# Patient Record
Sex: FEMALE | Race: WHITE | NOT HISPANIC OR LATINO | Employment: FULL TIME | URBAN - NONMETROPOLITAN AREA
[De-identification: names, ages, dates, MRNs, and addresses within clinical notes are randomized per-mention and may not be internally consistent; named-entity substitution may affect disease eponyms.]

---

## 2017-01-21 ENCOUNTER — APPOINTMENT (OUTPATIENT)
Dept: GENERAL RADIOLOGY | Facility: HOSPITAL | Age: 22
End: 2017-01-21

## 2017-01-21 ENCOUNTER — HOSPITAL ENCOUNTER (EMERGENCY)
Facility: HOSPITAL | Age: 22
Discharge: HOME | End: 2017-01-21
Admitting: EMERGENCY MEDICINE

## 2017-01-21 VITALS
HEIGHT: 62 IN | BODY MASS INDEX: 30.36 KG/M2 | DIASTOLIC BLOOD PRESSURE: 84 MMHG | OXYGEN SATURATION: 99 % | TEMPERATURE: 98.5 F | SYSTOLIC BLOOD PRESSURE: 128 MMHG | WEIGHT: 165 LBS | HEART RATE: 80 BPM | RESPIRATION RATE: 16 BRPM

## 2017-01-21 DIAGNOSIS — M54.9 ACUTE LEFT-SIDED BACK PAIN, UNSPECIFIED BACK LOCATION: Primary | ICD-10-CM

## 2017-01-21 LAB — B-HCG UR QL: NEGATIVE

## 2017-01-21 PROCEDURE — 25010000002 METHYLPREDNISOLONE PER 125 MG: Performed by: PHYSICIAN ASSISTANT

## 2017-01-21 PROCEDURE — 72110 X-RAY EXAM L-2 SPINE 4/>VWS: CPT | Performed by: RADIOLOGY

## 2017-01-21 PROCEDURE — 96372 THER/PROPH/DIAG INJ SC/IM: CPT

## 2017-01-21 PROCEDURE — 72072 X-RAY EXAM THORAC SPINE 3VWS: CPT | Performed by: RADIOLOGY

## 2017-01-21 PROCEDURE — 25010000002 KETOROLAC TROMETHAMINE PER 15 MG: Performed by: PHYSICIAN ASSISTANT

## 2017-01-21 PROCEDURE — 99283 EMERGENCY DEPT VISIT LOW MDM: CPT

## 2017-01-21 PROCEDURE — 72110 X-RAY EXAM L-2 SPINE 4/>VWS: CPT

## 2017-01-21 PROCEDURE — 25010000002 ORPHENADRINE CITRATE PER 60 MG: Performed by: PHYSICIAN ASSISTANT

## 2017-01-21 PROCEDURE — 72072 X-RAY EXAM THORAC SPINE 3VWS: CPT

## 2017-01-21 PROCEDURE — 81025 URINE PREGNANCY TEST: CPT | Performed by: PHYSICIAN ASSISTANT

## 2017-01-21 RX ORDER — LEVOTHYROXINE SODIUM 0.1 MG/1
100 TABLET ORAL DAILY
COMMUNITY
End: 2018-11-08 | Stop reason: DRUGHIGH

## 2017-01-21 RX ORDER — IBUPROFEN 800 MG/1
800 TABLET ORAL EVERY 6 HOURS PRN
Qty: 30 TABLET | Refills: 0 | Status: SHIPPED | OUTPATIENT
Start: 2017-01-21 | End: 2018-07-17

## 2017-01-21 RX ORDER — ORPHENADRINE CITRATE 30 MG/ML
60 INJECTION INTRAMUSCULAR; INTRAVENOUS ONCE
Status: COMPLETED | OUTPATIENT
Start: 2017-01-21 | End: 2017-01-21

## 2017-01-21 RX ORDER — METHYLPREDNISOLONE SODIUM SUCCINATE 125 MG/2ML
125 INJECTION, POWDER, LYOPHILIZED, FOR SOLUTION INTRAMUSCULAR; INTRAVENOUS ONCE
Status: COMPLETED | OUTPATIENT
Start: 2017-01-21 | End: 2017-01-21

## 2017-01-21 RX ORDER — BUPROPION HYDROCHLORIDE 150 MG/1
150 TABLET, EXTENDED RELEASE ORAL DAILY
COMMUNITY
End: 2018-07-19 | Stop reason: ALTCHOICE

## 2017-01-21 RX ORDER — KETOROLAC TROMETHAMINE 30 MG/ML
60 INJECTION, SOLUTION INTRAMUSCULAR; INTRAVENOUS ONCE
Status: COMPLETED | OUTPATIENT
Start: 2017-01-21 | End: 2017-01-21

## 2017-01-21 RX ADMIN — KETOROLAC TROMETHAMINE 60 MG: 60 INJECTION, SOLUTION INTRAMUSCULAR at 21:15

## 2017-01-21 RX ADMIN — ORPHENADRINE CITRATE 60 MG: 30 INJECTION INTRAMUSCULAR; INTRAVENOUS at 21:16

## 2017-01-21 RX ADMIN — METHYLPREDNISOLONE SODIUM SUCCINATE 125 MG: 125 INJECTION, POWDER, FOR SOLUTION INTRAMUSCULAR; INTRAVENOUS at 21:16

## 2017-01-22 NOTE — ED PROVIDER NOTES
Subjective   Patient is a 21 y.o. female presenting with back pain.   History provided by:  Patient   used: No    Back Pain   Location:  Thoracic spine and lumbar spine  Quality:  Shooting  Radiates to:  Does not radiate  Pain severity:  Moderate  Onset quality:  Sudden  Duration:  1 day  Timing:  Constant  Progression:  Worsening  Chronicity:  New  Context: falling    Context comment:  Patient slipped pulling a stretcher up a wooden ramp.   Relieved by:  Nothing  Worsened by:  Nothing  Ineffective treatments:  None tried      Review of Systems   Constitutional: Negative.    HENT: Negative.    Eyes: Negative.    Respiratory: Negative.    Cardiovascular: Negative.    Gastrointestinal: Negative.    Endocrine: Negative.    Genitourinary: Negative.    Musculoskeletal: Positive for back pain.   Skin: Negative.    Allergic/Immunologic: Negative.    Neurological: Negative.    Hematological: Negative.    Psychiatric/Behavioral: Negative.    All other systems reviewed and are negative.      Past Medical History   Diagnosis Date   • Disease of thyroid gland        No Known Allergies    Past Surgical History   Procedure Laterality Date   • Mouth surgery         History reviewed. No pertinent family history.    Social History     Social History   • Marital status: Single     Spouse name: N/A   • Number of children: N/A   • Years of education: N/A     Social History Main Topics   • Smoking status: Current Every Day Smoker     Packs/day: 0.50     Types: Cigarettes   • Smokeless tobacco: None   • Alcohol use No   • Drug use: No   • Sexual activity: Not Asked     Other Topics Concern   • None     Social History Narrative   • None           Objective   Physical Exam   Constitutional: She is oriented to person, place, and time. She appears well-developed and well-nourished.   HENT:   Head: Normocephalic and atraumatic.   Right Ear: External ear normal.   Left Ear: External ear normal.   Nose: Nose normal.    Mouth/Throat: Oropharynx is clear and moist.   Eyes: EOM are normal. Pupils are equal, round, and reactive to light.   Neck: Normal range of motion. Neck supple.   Cardiovascular: Normal rate, regular rhythm, normal heart sounds and intact distal pulses.    Pulmonary/Chest: Effort normal and breath sounds normal.   Abdominal: Soft. Bowel sounds are normal.   Musculoskeletal: Normal range of motion.        Thoracic back: She exhibits tenderness. She exhibits normal range of motion, no bony tenderness, no swelling, no edema, no deformity, no laceration, no pain, no spasm and normal pulse.        Lumbar back: She exhibits tenderness. She exhibits normal range of motion, no bony tenderness, no swelling, no edema, no deformity, no laceration, no pain, no spasm and normal pulse.   Neurological: She is alert and oriented to person, place, and time.   Skin: Skin is warm and dry.   Nursing note and vitals reviewed.      Procedures         ED Course  ED Course                  MDM    Final diagnoses:   Acute left-sided back pain, unspecified back location            GUS Prather  01/21/17 8419

## 2018-07-17 ENCOUNTER — OFFICE VISIT (OUTPATIENT)
Dept: PSYCHIATRY | Facility: CLINIC | Age: 23
End: 2018-07-17

## 2018-07-17 VITALS
DIASTOLIC BLOOD PRESSURE: 82 MMHG | SYSTOLIC BLOOD PRESSURE: 117 MMHG | BODY MASS INDEX: 36.8 KG/M2 | HEART RATE: 72 BPM | WEIGHT: 200 LBS | HEIGHT: 62 IN

## 2018-07-17 DIAGNOSIS — Z79.899 MEDICATION MANAGEMENT: ICD-10-CM

## 2018-07-17 DIAGNOSIS — F17.210 CIGARETTE NICOTINE DEPENDENCE WITHOUT COMPLICATION: ICD-10-CM

## 2018-07-17 DIAGNOSIS — F10.20 UNCOMPLICATED ALCOHOL DEPENDENCE (HCC): Primary | ICD-10-CM

## 2018-07-17 LAB
AMPHETAMINE CUT-OFF: 1000
BENZODIAZIPINE CUT-OFF: 300
BUPRENORPHINE CUT-OFF: 10
COCAINE CUT-OFF: 300
EXTERNAL AMPHETAMINE SCREEN URINE: NEGATIVE
EXTERNAL BENZODIAZEPINE SCREEN URINE: NEGATIVE
EXTERNAL BUPRENORPHINE SCREEN URINE: NEGATIVE
EXTERNAL COCAINE SCREEN URINE: NEGATIVE
EXTERNAL MDMA: NEGATIVE
EXTERNAL METHADONE SCREEN URINE: NEGATIVE
EXTERNAL METHAMPHETAMINE SCREEN URINE: NEGATIVE
EXTERNAL OPIATES SCREEN URINE: NEGATIVE
EXTERNAL OXYCODONE SCREEN URINE: NEGATIVE
EXTERNAL THC SCREEN URINE: NEGATIVE
MDMA CUT-OFF: 500
METHADONE CUT-OFF: 300
METHAMPHETAMINE CUT-OFF: 1000
OPIATES CUT-OFF: 300
OXYCODONE CUT-OFF: 100
THC CUT-OFF: 50

## 2018-07-17 PROCEDURE — 90792 PSYCH DIAG EVAL W/MED SRVCS: CPT | Performed by: NURSE PRACTITIONER

## 2018-07-17 RX ORDER — NAPROXEN 500 MG/1
TABLET ORAL
Refills: 0 | COMMUNITY
Start: 2018-05-31 | End: 2018-11-08 | Stop reason: SDDI

## 2018-07-17 RX ORDER — NORETHINDRONE ACETATE AND ETHINYL ESTRADIOL, AND FERROUS FUMARATE 1MG-20(24)
1 KIT ORAL DAILY
Refills: 4 | Status: ON HOLD | COMMUNITY
Start: 2018-07-05 | End: 2019-09-24

## 2018-07-17 RX ORDER — ARIPIPRAZOLE 5 MG/1
TABLET ORAL
Refills: 1 | Status: ON HOLD | COMMUNITY
Start: 2018-07-05 | End: 2019-09-24

## 2018-07-17 RX ORDER — HYDROXYZINE HYDROCHLORIDE 10 MG/1
10 TABLET, FILM COATED ORAL 3 TIMES DAILY
Refills: 1 | Status: ON HOLD | COMMUNITY
Start: 2018-07-05 | End: 2019-09-24

## 2018-07-17 RX ORDER — LEVOTHYROXINE SODIUM 0.03 MG/1
TABLET ORAL
Refills: 5 | COMMUNITY
Start: 2018-07-05 | End: 2023-02-13 | Stop reason: DRUGHIGH

## 2018-07-17 RX ORDER — ESCITALOPRAM OXALATE 20 MG/1
TABLET ORAL
Refills: 2 | Status: ON HOLD | COMMUNITY
Start: 2018-07-05 | End: 2019-09-24
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
## 2018-07-19 ENCOUNTER — LAB (OUTPATIENT)
Dept: LAB | Facility: HOSPITAL | Age: 23
End: 2018-07-19

## 2018-07-19 ENCOUNTER — OFFICE VISIT (OUTPATIENT)
Dept: PSYCHIATRY | Facility: HOSPITAL | Age: 23
End: 2018-07-19

## 2018-07-19 DIAGNOSIS — F33.1 MODERATE EPISODE OF RECURRENT MAJOR DEPRESSIVE DISORDER (HCC): ICD-10-CM

## 2018-07-19 DIAGNOSIS — Z79.899 LONG TERM USE OF DRUG: ICD-10-CM

## 2018-07-19 DIAGNOSIS — F10.20 UNCOMPLICATED ALCOHOL DEPENDENCE (HCC): Primary | ICD-10-CM

## 2018-07-19 DIAGNOSIS — Z79.899 LONG TERM USE OF DRUG: Primary | ICD-10-CM

## 2018-07-19 LAB
ALBUMIN SERPL-MCNC: 4.5 G/DL (ref 3.5–5)
ALBUMIN/GLOB SERPL: 1.3 G/DL (ref 1.5–2.5)
ALP SERPL-CCNC: 52 U/L (ref 35–104)
ALT SERPL W P-5'-P-CCNC: 17 U/L (ref 10–36)
ANION GAP SERPL CALCULATED.3IONS-SCNC: 6.4 MMOL/L (ref 3.6–11.2)
AST SERPL-CCNC: 25 U/L (ref 10–30)
BILIRUB SERPL-MCNC: 0.3 MG/DL (ref 0.2–1.8)
BUN BLD-MCNC: 5 MG/DL (ref 7–21)
BUN/CREAT SERPL: 7 (ref 7–25)
CALCIUM SPEC-SCNC: 9.5 MG/DL (ref 7.7–10)
CHLORIDE SERPL-SCNC: 108 MMOL/L (ref 99–112)
CO2 SERPL-SCNC: 22.6 MMOL/L (ref 24.3–31.9)
CREAT BLD-MCNC: 0.71 MG/DL (ref 0.43–1.29)
ETHANOL URINE SCREEN: NEGATIVE
GFR SERPL CREATININE-BSD FRML MDRD: 104 ML/MIN/1.73
GLOBULIN UR ELPH-MCNC: 3.6 GM/DL
GLUCOSE BLD-MCNC: 97 MG/DL (ref 70–110)
OSMOLALITY SERPL CALC.SUM OF ELEC: 271 MOSM/KG (ref 273–305)
POTASSIUM BLD-SCNC: 3.8 MMOL/L (ref 3.5–5.3)
PROT SERPL-MCNC: 8.1 G/DL (ref 6–8)
SODIUM BLD-SCNC: 137 MMOL/L (ref 135–153)

## 2018-07-19 PROCEDURE — 36415 COLL VENOUS BLD VENIPUNCTURE: CPT

## 2018-07-19 PROCEDURE — G0483 DRUG TEST DEF 22+ CLASSES: HCPCS

## 2018-07-19 PROCEDURE — 80053 COMPREHEN METABOLIC PANEL: CPT | Performed by: NURSE PRACTITIONER

## 2018-07-19 PROCEDURE — 80307 DRUG TEST PRSMV CHEM ANLYZR: CPT

## 2018-07-19 PROCEDURE — 99214 OFFICE O/P EST MOD 30 MIN: CPT | Performed by: NURSE PRACTITIONER

## 2018-07-19 RX ORDER — NALTREXONE HYDROCHLORIDE 50 MG/1
50 TABLET, FILM COATED ORAL DAILY
Qty: 30 TABLET | Refills: 0 | Status: SHIPPED | OUTPATIENT
Start: 2018-07-19 | End: 2018-07-26 | Stop reason: ALTCHOICE
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
## 2018-07-23 ENCOUNTER — LAB (OUTPATIENT)
Dept: LAB | Facility: HOSPITAL | Age: 23
End: 2018-07-23

## 2018-07-23 ENCOUNTER — OFFICE VISIT (OUTPATIENT)
Dept: PSYCHIATRY | Facility: HOSPITAL | Age: 23
End: 2018-07-23

## 2018-07-23 DIAGNOSIS — F33.1 MODERATE EPISODE OF RECURRENT MAJOR DEPRESSIVE DISORDER (HCC): ICD-10-CM

## 2018-07-23 DIAGNOSIS — F10.20 ACUTE ALCOHOLISM (HCC): Primary | ICD-10-CM

## 2018-07-23 DIAGNOSIS — F17.210 CIGARETTE NICOTINE DEPENDENCE WITHOUT COMPLICATION: ICD-10-CM

## 2018-07-23 DIAGNOSIS — F10.20 ACUTE ALCOHOLISM (HCC): ICD-10-CM

## 2018-07-23 DIAGNOSIS — F17.210 CIGARETTE SMOKER: ICD-10-CM

## 2018-07-23 DIAGNOSIS — Z79.899 MEDICATION MANAGEMENT: ICD-10-CM

## 2018-07-23 DIAGNOSIS — F10.20 UNCOMPLICATED ALCOHOL DEPENDENCE (HCC): Primary | ICD-10-CM

## 2018-07-23 LAB
6-ACETYL MORPHINE: NEGATIVE
AMPHET+METHAMPHET UR QL: NEGATIVE
BARBITURATES UR QL SCN: NEGATIVE
BENZODIAZ UR QL SCN: NEGATIVE
BUPRENORPHINE SERPL-MCNC: NEGATIVE NG/ML
CANNABINOIDS SERPL QL: NEGATIVE
COCAINE UR QL: NEGATIVE
ETHANOL URINE SCREEN: NEGATIVE
METHADONE UR QL SCN: NEGATIVE
OPIATES UR QL: NEGATIVE
OXYCODONE UR QL SCN: NEGATIVE
PCP UR QL SCN: NEGATIVE

## 2018-07-23 PROCEDURE — 80307 DRUG TEST PRSMV CHEM ANLYZR: CPT

## 2018-07-24 ENCOUNTER — LAB (OUTPATIENT)
Dept: LAB | Facility: HOSPITAL | Age: 23
End: 2018-07-24

## 2018-07-24 ENCOUNTER — DOCUMENTATION (OUTPATIENT)
Dept: PSYCHIATRY | Facility: HOSPITAL | Age: 23
End: 2018-07-24

## 2018-07-24 ENCOUNTER — OFFICE VISIT (OUTPATIENT)
Dept: PSYCHIATRY | Facility: HOSPITAL | Age: 23
End: 2018-07-24

## 2018-07-24 DIAGNOSIS — F10.20 ACUTE ALCOHOLISM (HCC): ICD-10-CM

## 2018-07-24 DIAGNOSIS — Z79.899 MEDICATION MANAGEMENT: ICD-10-CM

## 2018-07-24 DIAGNOSIS — F17.210 CIGARETTE SMOKER: ICD-10-CM

## 2018-07-24 DIAGNOSIS — F10.20 ACUTE ALCOHOLISM (HCC): Primary | ICD-10-CM

## 2018-07-24 PROCEDURE — 80307 DRUG TEST PRSMV CHEM ANLYZR: CPT

## 2018-07-24 NOTE — PROGRESS NOTES
"NAME: Veronica Moore  Date: 07/19/18  Phase I 1:00 pm - 4:00 pm     IOP GROUP NOTE     DATA:     3 hour IOP group therapy session (Check ins, Fulfilling dreams, Coping Skills )     Hour 1:Therapist facilitated discussion of the daily motivation passage from the “Daily Reflections” ([employer] (1991). Therapist continued facilitation of rapport building strategies between group members. Therapist asked that each patient check in with home life and recovery efforts and identify triggers, cravings, and high risk situations that arise between group sessions. Members discussed barriers and benefits of attending 12 step meetings. Therapist provided empathy and support during group meeting.  “Dreams that we gave up long ago can now become realities.”   Basic Text, p. 71     Hour 2:  Recreational Therapist, Alonzo Laughlin, provided a group experience centered on therapeutic \"fun\".  Members were encouraged, through music, to find healthy ways of coping with life and relapse triggers.     Hour 3: Therapist facilitated a meeting on recognizing past behaviors that have contributed to damaged relationships and discovering ways to cope with life stressors.    RESPONSE: This is Veronica's first IOP group session.  She indicates that she feels hopeful because she started her sober program today.  She wants to these sober for her family and also so that she won't get fired from her job.  She indicates that she has been 17 days sober with his sobriety date July 2, 2018.  She indicates that she is open to attending meetings and get a sponsor.  Patient indicates that her cravings are as 7 out of 1010 being the most severe.  She is eating okay she is sleeping okay no withdrawal symptoms reported.     ASSESSMENT:     Lab Review  negative alcohol   CDT pending    Mental Status Exam  Hygiene:  good  Dress: alcohol  Attitude: cooperative and agreeable   Motor Activity: appropriate  Speech: regular rate and rhythm   Mood:  calm and " conversant  Affect:  Appropriate  Thought Processes:  Goal directed and Linear  Thought Content:  Normal and Mood congurent  Suicidal Thoughts:  denies  Homicidal Thoughts:  denies  Crisis Safety Plan: yes, to come to the emergency room.  Hallucinations:  denies  Reliability: adequate  Insight: adequate  Judgement: adequate  Impulse Control: adequate    Patient's Support Network Includes: The patient lacks significant family support.    Progress toward goal: Not at goal    FUNCTIONING ASSESSMENT:  Community Living Skills: Within functional limits  Interpersonal Skills:  Within functional limits  Health and Physical  Within functional limits  Psychological State:  Within functional limits    Prognosis: Guarded with Ongoing Treatment    Family issues related to recovery:  Not known    12-Step attendance: first IOP group session    Sponsor:  no,  Have you made contact with  him/her this past week?  . Has two weeks to get a sponsor    Identification of environmental and personal barriers to recovery: coping with limited emotions, remorse, guilt, work, family, overall understanding of disease of addiction     Motivation for treatment:  healthy lifestyle, long-term, recovery, and improving overall relationships    Impression/Formulation:      ICD-10-CM ICD-9-CM   1. Uncomplicated alcohol dependence (CMS/HCC) F10.20 303.90   2. Moderate episode of recurrent major depressive disorder (CMS/HCC) F33.1 296.32       CLINICAL MANUVERING/INTERVENTIONS: CBT and group interaction activites utilized to stress relapse recovery/prevention. Patient to actively participate in activity, engage verbally with peers and staff, display an appropriate affect, keep conversation appropriate to topic, and display no negative or impulsive behaviors. Member was encouraged to bring family members for educational group on Wednesdays and Thursday. Member was provided with encouragement and unconditional positive regard. Member was encouraged to  continue participation with 12-step meetings and maintaining positive daily choices.       INTERVENTIONS:  CBT and group interaction activites utilized to stress relapse recovery/prevention. Patient to actively participate in activity, engage verbally with peers and staff, display an appropriate affect, keep conversation appropriate to topic, and display no negative or impulsive behaviors. Member was encouraged to bring family members for educational group on Wednesdays and Thursday. Member was provided with encouragement and unconditional positive regard. Member was encouraged to continue participation with 12-step meetings and maintaining positive daily choices.     BASELINE SCORES 7/19/18  DASES SCORE: pending  URICA: pending  AUDIT: pending  DAST: pending  UPDATED SCORES na    PLAN:  Continue [facility] IOP Phase I.   Aftercare:  [facility] IOP Phase II  Program Assignments:  Personal Journal, attendance of 12-step meetings, drug screens        This document signed by [facility]

## 2018-07-25 NOTE — PROGRESS NOTES
"NAME: Veronica Moore  Date: 07/23/18  Phase I 1:00 PM - 4:00 PM     IOP GROUP NOTE     DATA:     3 hour IOP group therapy session (Check ins, Surrendering Self Will, Coping Skills )     Hour 1:Therapist facilitated discussion of the daily motivation passage from the “Daily Reflections” ([employer] (1991). Therapist continued facilitation of rapport building strategies between group members. Therapist asked that each patient check in with home life and recovery efforts and identify triggers, cravings, and high risk situations that arise between group sessions. Members discussed barriers and benefits of attending 12 step meetings. Therapist provided empathy and support during group meeting.  “We want and demand that things always go our way.  We should know from our past experience that our way of doing things did not work.”  Basic Text, p. 93  Hour 2:  Therapist facilitated a group discussion on biopsychosocial \"stories\".  Participants shared their \"stories\" with group members.           Hour 3: Family Hour:  Therapist facilitated a group activity which allowed supportive family members to communicate their thoughts and feelings in a safe and therapeutic way.    RESPONSE: Veronica presents on time and interacts appropriately within all group processes.  She notes that her recovery is worth it because she feels that she is having new opportunities coming her way.  She feels happy because she is an IOP.  She is struggling with finances because she is currently on leave from work.  She indicates that she has been 21 days sober and denies relapse.  She is currently on naltrexone but is interested in live a child.  She just attended her first meeting and is looking for sponsor.  She notes that she is sleeping okay and she is eating okay.  She denies depression.  She denies anxiety.  She denies high risk situations.  She denies triggers.     ASSESSMENT:     Lab Review  negative alcohol   CDT pending     Mental Status " Exam  Hygiene:  good  Dress: alcohol  Attitude: cooperative and agreeable   Motor Activity: appropriate  Speech: regular rate and rhythm   Mood:  calm and conversant  Affect:  Appropriate  Thought Processes:  Goal directed and Linear  Thought Content:  Normal and Mood congurent  Suicidal Thoughts:  denies  Homicidal Thoughts:  denies  Crisis Safety Plan: yes, to come to the emergency room.  Hallucinations:  denies  Reliability: adequate  Insight: adequate  Judgement: adequate  Impulse Control: adequate     Patient's Support Network Includes: The patient lacks significant family support.     Progress toward goal: Not at goal     FUNCTIONING ASSESSMENT:  Community Living Skills: Within functional limits  Interpersonal Skills:  Within functional limits  Health and Physical  Within functional limits  Psychological State:  Within functional limits     Prognosis: Guarded with Ongoing Treatment     Family issues related to recovery:  Not known     12-Step attendance: first IOP group session     Sponsor:  no,  Have you made contact with  him/her this past week?  . Has two weeks to get a sponsor     Identification of environmental and personal barriers to recovery: coping with limited emotions, remorse, guilt, work, family, overall understanding of disease of addiction      Motivation for treatment:  healthy lifestyle, long-term, recovery, and improving overall relationships     Impression/Formulation:    ICD-10-CM ICD-9-CM   1. Uncomplicated alcohol dependence (CMS/HCC) F10.20 303.90   2. Moderate episode of recurrent major depressive disorder (CMS/HCC) F33.1 296.32   3. Cigarette nicotine dependence without complication F17.210 305.1     CLINICAL MANUVERING/INTERVENTIONS: CBT and group interaction activites utilized to stress relapse recovery/prevention. Patient to actively participate in activity, engage verbally with peers and staff, display an appropriate affect, keep conversation appropriate to topic, and display no  negative or impulsive behaviors. Member was encouraged to bring family members for educational group on Wednesdays and Thursday. Member was provided with encouragement and unconditional positive regard. Member was encouraged to continue participation with 12-step meetings and maintaining positive daily choices.       INTERVENTIONS:  CBT and group interaction activites utilized to stress relapse recovery/prevention. Patient to actively participate in activity, engage verbally with peers and staff, display an appropriate affect, keep conversation appropriate to topic, and display no negative or impulsive behaviors. Member was encouraged to bring family members for educational group on Wednesdays and Thursday. Member was provided with encouragement and unconditional positive regard. Member was encouraged to continue participation with 12-step meetings and maintaining positive daily choices.      BASELINE SCORES 7/19/18  DASES SCORE: pending  URICA: pending  AUDIT: pending  DAST: pending  UPDATED SCORES na  PLAN:  Continue [facility] IOP Phase I.   Aftercare:  [facility] IOP Phase II  Program Assignments:  Personal Journal, attendance of 12-step meetings, drug screens        This document signed by Sanjuana Arthur [facility]

## 2018-07-26 ENCOUNTER — OFFICE VISIT (OUTPATIENT)
Dept: PSYCHIATRY | Facility: HOSPITAL | Age: 23
End: 2018-07-26

## 2018-07-26 ENCOUNTER — TELEPHONE (OUTPATIENT)
Dept: PSYCHIATRY | Facility: CLINIC | Age: 23
End: 2018-07-26

## 2018-07-26 ENCOUNTER — LAB (OUTPATIENT)
Dept: LAB | Facility: HOSPITAL | Age: 23
End: 2018-07-26

## 2018-07-26 DIAGNOSIS — F17.210 CIGARETTE SMOKER: ICD-10-CM

## 2018-07-26 DIAGNOSIS — F10.20 ACUTE ALCOHOLISM (HCC): ICD-10-CM

## 2018-07-26 DIAGNOSIS — F33.1 MODERATE EPISODE OF RECURRENT MAJOR DEPRESSIVE DISORDER (HCC): ICD-10-CM

## 2018-07-26 DIAGNOSIS — F17.210 CIGARETTE NICOTINE DEPENDENCE WITHOUT COMPLICATION: ICD-10-CM

## 2018-07-26 DIAGNOSIS — F10.20 UNCOMPLICATED ALCOHOL DEPENDENCE (HCC): Primary | ICD-10-CM

## 2018-07-26 DIAGNOSIS — Z79.899 MEDICATION MANAGEMENT: ICD-10-CM

## 2018-07-26 LAB
6-ACETYL MORPHINE: NEGATIVE
AMPHET+METHAMPHET UR QL: NEGATIVE
BARBITURATES UR QL SCN: NEGATIVE
BENZODIAZ UR QL SCN: NEGATIVE
BUPRENORPHINE SERPL-MCNC: NEGATIVE NG/ML
CANNABINOIDS SERPL QL: NEGATIVE
COCAINE UR QL: NEGATIVE
CONV REPORT SUMMARY: NORMAL
ETHANOL URINE SCREEN: NEGATIVE
METHADONE UR QL SCN: NEGATIVE
OPIATES UR QL: NEGATIVE
OXYCODONE UR QL SCN: NEGATIVE
PCP UR QL SCN: NEGATIVE

## 2018-07-26 PROCEDURE — 80307 DRUG TEST PRSMV CHEM ANLYZR: CPT

## 2018-07-26 NOTE — TELEPHONE ENCOUNTER
Dona from the pharmacy called stated they didn't not receive the vivitrol injection she also called [facility] please resend. It will not let me reorder it

## 2018-07-26 NOTE — PROGRESS NOTES
NAME: Veronica Moore  Date: 07/24/18  Phase I 1:00 pm - 4:00 pm    IOP GROUP NOTE    DATA:     3 hour IOP group therapy session (Check ins, Masks, Coping Skills )     Hour 1:Therapist facilitated discussion of the daily motivation passage from the “Daily Reflections” ([employer] (1991). Therapist continued facilitation of rapport building strategies between group members. Therapist asked that each patient check in with home life and recovery efforts and identify triggers, cravings, and high risk situations that arise between group sessions. Members discussed barriers and benefits of attending 12 step meetings. Therapist provided empathy and support during group meeting.  “...we covered low self-esteem by hiding behind phony images that we hoped would fool people. The masks have to go.”  Basic Text, p. 33    Hour 2/3:  Therapist facilitated a group exploration on their family history.  Participants shared their personal genograms with the group and identified family patterns relating to substance dependence and mental illness.    RESPONSE: Veronica is struggling with finances, and enrolling in school.  She denies relapse.      ASSESSMENT:     Lab Review  negative alcohol   CDT - negative     Mental Status Exam  Hygiene:  good  Dress: alcohol  Attitude: cooperative and agreeable   Motor Activity: appropriate  Speech: regular rate and rhythm   Mood:  calm and conversant  Affect:  Appropriate  Thought Processes:  Goal directed and Linear  Thought Content:  Normal and Mood congurent  Suicidal Thoughts:  denies  Homicidal Thoughts:  denies  Crisis Safety Plan: yes, to come to the emergency room.  Hallucinations:  denies  Reliability: adequate  Insight: adequate  Judgement: adequate  Impulse Control: adequate     Patient's Support Network Includes: The patient lacks significant family support.     Progress toward goal: Not at goal     FUNCTIONING ASSESSMENT:  Community Living Skills: Within functional limits  Interpersonal  Skills:  Within functional limits  Health and Physical  Within functional limits  Psychological State:  Within functional limits     Prognosis: Guarded with Ongoing Treatment     Family issues related to recovery:  Not known     12-Step attendance: yes     Sponsor:  no,  Have you made contact with  him/her this past week?  . Has two weeks to get a sponsor     Identification of environmental and personal barriers to recovery: coping with limited emotions, remorse, guilt, work, family, overall understanding of disease of addiction      Motivation for treatment:  healthy lifestyle, long-term, recovery, and improving overall relationships     Impression/Formulation:    ICD-10-CM ICD-9-CM   1. Acute alcoholism (CMS/HCC) F10.20 303.00   2. Cigarette smoker F17.210 305.1   3. Medication management Z79.899 V58.69      CLINICAL MANUVERING/INTERVENTIONS: CBT and group interaction activites utilized to stress relapse recovery/prevention. Patient to actively participate in activity, engage verbally with peers and staff, display an appropriate affect, keep conversation appropriate to topic, and display no negative or impulsive behaviors. Member was encouraged to bring family members for educational group on Wednesdays and Thursday. Member was provided with encouragement and unconditional positive regard. Member was encouraged to continue participation with 12-step meetings and maintaining positive daily choices.       INTERVENTIONS:  CBT and group interaction activites utilized to stress relapse recovery/prevention. Patient to actively participate in activity, engage verbally with peers and staff, display an appropriate affect, keep conversation appropriate to topic, and display no negative or impulsive behaviors. Member was encouraged to bring family members for educational group on Wednesdays and Thursday. Member was provided with encouragement and unconditional positive regard. Member was encouraged to continue participation  with 12-step meetings and maintaining positive daily choices.      BASELINE SCORES 7/19/18  DASES                   48  URICA                   12 PREPARATION  AUDIT                   37  DAST: pending  UPDATED SCORES na  PLAN:  Continue [facility] IOP Phase I.   Aftercare:  [facility] IOP Phase II  Program Assignments:  Personal Journal, attendance of 12-step meetings, drug screens        This document signed by Sanjuana [facility]

## 2018-07-30 ENCOUNTER — OFFICE VISIT (OUTPATIENT)
Dept: PSYCHIATRY | Facility: HOSPITAL | Age: 23
End: 2018-07-30

## 2018-07-30 ENCOUNTER — LAB (OUTPATIENT)
Dept: LAB | Facility: HOSPITAL | Age: 23
End: 2018-07-30

## 2018-07-30 DIAGNOSIS — Z79.899 MEDICATION MANAGEMENT: ICD-10-CM

## 2018-07-30 DIAGNOSIS — F17.210 CIGARETTE NICOTINE DEPENDENCE WITHOUT COMPLICATION: ICD-10-CM

## 2018-07-30 DIAGNOSIS — F10.20 UNCOMPLICATED ALCOHOL DEPENDENCE (HCC): Primary | ICD-10-CM

## 2018-07-30 DIAGNOSIS — F33.1 MODERATE EPISODE OF RECURRENT MAJOR DEPRESSIVE DISORDER (HCC): ICD-10-CM

## 2018-07-30 DIAGNOSIS — F17.210 CIGARETTE SMOKER: ICD-10-CM

## 2018-07-30 DIAGNOSIS — F10.20 ACUTE ALCOHOLISM (HCC): ICD-10-CM

## 2018-07-30 PROCEDURE — 80307 DRUG TEST PRSMV CHEM ANLYZR: CPT

## 2018-07-31 ENCOUNTER — LAB (OUTPATIENT)
Dept: LAB | Facility: HOSPITAL | Age: 23
End: 2018-07-31

## 2018-07-31 ENCOUNTER — OFFICE VISIT (OUTPATIENT)
Dept: PSYCHIATRY | Facility: HOSPITAL | Age: 23
End: 2018-07-31

## 2018-07-31 DIAGNOSIS — Z79.899 MEDICATION MANAGEMENT: ICD-10-CM

## 2018-07-31 DIAGNOSIS — F17.210 CIGARETTE NICOTINE DEPENDENCE WITHOUT COMPLICATION: ICD-10-CM

## 2018-07-31 DIAGNOSIS — F17.210 CIGARETTE SMOKER: ICD-10-CM

## 2018-07-31 DIAGNOSIS — F33.1 MODERATE EPISODE OF RECURRENT MAJOR DEPRESSIVE DISORDER (HCC): ICD-10-CM

## 2018-07-31 DIAGNOSIS — F10.20 UNCOMPLICATED ALCOHOL DEPENDENCE (HCC): Primary | ICD-10-CM

## 2018-07-31 DIAGNOSIS — F19.10 SUBSTANCE ABUSE (HCC): ICD-10-CM

## 2018-07-31 DIAGNOSIS — F10.20 ACUTE ALCOHOLISM (HCC): ICD-10-CM

## 2018-07-31 DIAGNOSIS — F19.10 SUBSTANCE ABUSE (HCC): Primary | ICD-10-CM

## 2018-07-31 LAB
6-ACETYL MORPHINE: NEGATIVE
ALBUMIN SERPL-MCNC: 4.5 G/DL (ref 3.5–5)
ALP SERPL-CCNC: 55 U/L (ref 35–104)
ALT SERPL W P-5'-P-CCNC: 12 U/L (ref 10–36)
AMPHET+METHAMPHET UR QL: NEGATIVE
ANION GAP SERPL CALCULATED.3IONS-SCNC: 4.7 MMOL/L (ref 3.6–11.2)
AST SERPL-CCNC: 22 U/L (ref 10–30)
BARBITURATES UR QL SCN: NEGATIVE
BASOPHILS # BLD AUTO: 0.04 10*3/MM3 (ref 0–0.3)
BASOPHILS NFR BLD AUTO: 0.4 % (ref 0–2)
BENZODIAZ UR QL SCN: NEGATIVE
BILIRUB CONJ SERPL-MCNC: 0 MG/DL (ref 0–0.2)
BILIRUB INDIRECT SERPL-MCNC: 0.2 MG/DL
BILIRUB SERPL-MCNC: 0.2 MG/DL (ref 0.2–1.8)
BUN BLD-MCNC: 8 MG/DL (ref 7–21)
BUN/CREAT SERPL: 10.8 (ref 7–25)
BUPRENORPHINE SERPL-MCNC: NEGATIVE NG/ML
CALCIUM SPEC-SCNC: 9.4 MG/DL (ref 7.7–10)
CANNABINOIDS SERPL QL: NEGATIVE
CHLORIDE SERPL-SCNC: 107 MMOL/L (ref 99–112)
CO2 SERPL-SCNC: 24.3 MMOL/L (ref 24.3–31.9)
COCAINE UR QL: NEGATIVE
CREAT BLD-MCNC: 0.74 MG/DL (ref 0.43–1.29)
DEPRECATED RDW RBC AUTO: 42.7 FL (ref 37–54)
EOSINOPHIL # BLD AUTO: 0.15 10*3/MM3 (ref 0–0.7)
EOSINOPHIL NFR BLD AUTO: 1.4 % (ref 0–5)
ERYTHROCYTE [DISTWIDTH] IN BLOOD BY AUTOMATED COUNT: 12.9 % (ref 11.5–14.5)
ETHANOL URINE SCREEN: NEGATIVE
GFR SERPL CREATININE-BSD FRML MDRD: 99 ML/MIN/1.73
GLUCOSE BLD-MCNC: 77 MG/DL (ref 70–110)
HCT VFR BLD AUTO: 39.8 % (ref 37–47)
HGB BLD-MCNC: 13.1 G/DL (ref 12–16)
IMM GRANULOCYTES # BLD: 0.02 10*3/MM3 (ref 0–0.03)
IMM GRANULOCYTES NFR BLD: 0.2 % (ref 0–0.5)
LYMPHOCYTES # BLD AUTO: 3.13 10*3/MM3 (ref 1–3)
LYMPHOCYTES NFR BLD AUTO: 28.7 % (ref 21–51)
MCH RBC QN AUTO: 30.8 PG (ref 27–33)
MCHC RBC AUTO-ENTMCNC: 32.9 G/DL (ref 33–37)
MCV RBC AUTO: 93.4 FL (ref 80–94)
METHADONE UR QL SCN: NEGATIVE
MONOCYTES # BLD AUTO: 0.93 10*3/MM3 (ref 0.1–0.9)
MONOCYTES NFR BLD AUTO: 8.5 % (ref 0–10)
NEUTROPHILS # BLD AUTO: 6.63 10*3/MM3 (ref 1.4–6.5)
NEUTROPHILS NFR BLD AUTO: 60.8 % (ref 30–70)
OPIATES UR QL: NEGATIVE
OSMOLALITY SERPL CALC.SUM OF ELEC: 269.1 MOSM/KG (ref 273–305)
OXYCODONE UR QL SCN: NEGATIVE
PCP UR QL SCN: NEGATIVE
PLATELET # BLD AUTO: 329 10*3/MM3 (ref 130–400)
PMV BLD AUTO: 9 FL (ref 6–10)
POTASSIUM BLD-SCNC: 3.7 MMOL/L (ref 3.5–5.3)
PROT SERPL-MCNC: 7.8 G/DL (ref 6–8)
RBC # BLD AUTO: 4.26 10*6/MM3 (ref 4.2–5.4)
SODIUM BLD-SCNC: 136 MMOL/L (ref 135–153)
T4 FREE SERPL-MCNC: 1.01 NG/DL (ref 0.89–1.76)
TSH SERPL DL<=0.05 MIU/L-ACNC: 3.23 MIU/ML (ref 0.55–4.78)
VIT B12 BLD-MCNC: 495 PG/ML (ref 211–911)
WBC NRBC COR # BLD: 10.9 10*3/MM3 (ref 4.5–12.5)

## 2018-07-31 PROCEDURE — 80307 DRUG TEST PRSMV CHEM ANLYZR: CPT

## 2018-07-31 PROCEDURE — 80076 HEPATIC FUNCTION PANEL: CPT

## 2018-07-31 PROCEDURE — 84439 ASSAY OF FREE THYROXINE: CPT

## 2018-07-31 PROCEDURE — 82607 VITAMIN B-12: CPT

## 2018-07-31 PROCEDURE — 82652 VIT D 1 25-DIHYDROXY: CPT

## 2018-07-31 PROCEDURE — 85025 COMPLETE CBC W/AUTO DIFF WBC: CPT

## 2018-07-31 PROCEDURE — 36415 COLL VENOUS BLD VENIPUNCTURE: CPT

## 2018-07-31 PROCEDURE — 80048 BASIC METABOLIC PNL TOTAL CA: CPT

## 2018-07-31 PROCEDURE — 84443 ASSAY THYROID STIM HORMONE: CPT

## 2018-07-31 NOTE — PROGRESS NOTES
"NAME: Veronica Moore  Date: 07/26/18  Phase I 8:30 am - 11:30 am     IOP GROUP NOTE     DATA:     3 hour IOP group therapy session (Check ins, , Coping Skills )     Hour 1:Therapist facilitated discussion of the daily motivation passage from the “Daily Reflections” ([employer] (1991). Therapist continued facilitation of rapport building strategies between group members. Therapist asked that each patient check in with home life and recovery efforts and identify triggers, cravings, and high risk situations that arise between group sessions. Members discussed barriers and benefits of attending 12 step meetings. Therapist provided empathy and support during group meeting.    Hour 2:  Recreational Therapist, Alonzo Laughlin, provided a group experience centered on therapeutic \"fun\".  Members were encouraged, through music, to find healthy ways of coping with life and relapse triggers.     Hour 3: Therapist facilitated a meeting on recognizing past behaviors that have contributed to damaged relationships and discovering ways to cope with life stressors.   RESPONSE: Veronica presents on time and interacts appropriately within all group processes.  She notes that she is sleeping okay and she is eating okay.  She denies depression.  She denies anxiety.  She denies high risk situations.  She denies triggers. She denies relapse.   ASSESSMENT:      Lab Review  negative alcohol      Mental Status Exam  Hygiene:  good  Dress: alcohol  Attitude: cooperative and agreeable   Motor Activity: appropriate  Speech: regular rate and rhythm   Mood:  calm and conversant  Affect:  Appropriate  Thought Processes:  Goal directed and Linear  Thought Content:  Normal and Mood congurent  Suicidal Thoughts:  denies  Homicidal Thoughts:  denies  Crisis Safety Plan: yes, to come to the emergency room.  Hallucinations:  denies  Reliability: adequate  Insight: adequate  Judgement: adequate  Impulse Control: adequate     Patient's Support Network Includes: " The patient lacks significant family support.     Progress toward goal: Not at goal     FUNCTIONING ASSESSMENT:  Community Living Skills: Within functional limits  Interpersonal Skills:  Within functional limits  Health and Physical  Within functional limits  Psychological State:  Within functional limits     Prognosis: Guarded with Ongoing Treatment     Family issues related to recovery:  Not known     12-Step attendance: first IOP group session     Sponsor:  no,  Have you made contact with  him/her this past week?  . Has two weeks to get a sponsor     Identification of environmental and personal barriers to recovery: coping with limited emotions, remorse, guilt, work, family, overall understanding of disease of addiction      Motivation for treatment:  healthy lifestyle, long-term, recovery, and improving overall relationships     Impression/Formulation:      ICD-10-CM ICD-9-CM   1. Uncomplicated alcohol dependence (CMS/HCC) F10.20 303.90   2. Medication management Z79.899 V58.69   3. Moderate episode of recurrent major depressive disorder (CMS/HCC) F33.1 296.32   4. Cigarette nicotine dependence without complication F17.210 305.1       CLINICAL MANUVERING/INTERVENTIONS: CBT and group interaction activites utilized to stress relapse recovery/prevention. Patient to actively participate in activity, engage verbally with peers and staff, display an appropriate affect, keep conversation appropriate to topic, and display no negative or impulsive behaviors. Member was encouraged to bring family members for educational group on Wednesdays and Thursday. Member was provided with encouragement and unconditional positive regard. Member was encouraged to continue participation with 12-step meetings and maintaining positive daily choices.       INTERVENTIONS:  CBT and group interaction activites utilized to stress relapse recovery/prevention. Patient to actively participate in activity, engage verbally with peers and staff,  display an appropriate affect, keep conversation appropriate to topic, and display no negative or impulsive behaviors. Member was encouraged to bring family members for educational group on Wednesdays and Thursday. Member was provided with encouragement and unconditional positive regard. Member was encouraged to continue participation with 12-step meetings and maintaining positive daily choices.     BASELINE SCORES 7/19/18  DASES                   48  URICA                   12 PREPARATION  AUDIT                   37  DAST: pending  UPDATED SCORES naPLAN:  Continue [facility] IOP Phase I.   Aftercare:  [facility] IOP Phase II  Program Assignments:  Personal Journal, attendance of 12-step meetings, drug screens        This document signed by Sanjuana Arthur [facility]

## 2018-08-01 ENCOUNTER — DOCUMENTATION (OUTPATIENT)
Dept: PSYCHIATRY | Facility: HOSPITAL | Age: 23
End: 2018-08-01

## 2018-08-01 NOTE — TREATMENT PLAN
[facility]  BEHAVIORAL HEALTH TREATMENT PLAN  DATE: 08/01/18  Long Term Goal: Maintain sobriety and develop recovery skills to improve her  level of functioning.  Patient Care Needs Objectives Target Date Extend Date Date Met Interventions Responsible Team Member    Relapse Risk: Patient presents with ALCOHOL Dependence and is at high risk for relapse.  Patient has long history of substance use with minimal periods of abstinence.    Case Management:   Patient presents with house, transportation, and difficulties navigating/accessing community resources to meet normal activities of daily life.     1.Patient to display an increase in DASES AND URICA scores indicating increased confidence in her ability to maintain abstinence.  A. Identify high risk people, places, and situations that must be avoided or managed to prevent relapse.  B. Identify specific recovery action steps she can take when faced with high risk situations.  C. Attend group sessions as scheduled and participate by giving and receiving feedback.  D. Develop a positive network of support by attending a minimum of three 12 step support groups weekly and obtain a sponsor within first two weeks.  E. Write a personal recovery plan and share it with the group prior to discharge.  F. Identify healthy coping strategies to manage difficult emotions without using drugs or alcohol.  G. Complete a 12-step/12 traditions review prior to discharge  H.  Complete a screening assessment and identify steps to resolve issues that might hinder independent sober living. 08/28/18    /   /       /   /    1a. Educate patient about the disease concept and relapse prevention skills.  B. Provide Narcotics/Alcoholics  Anonymous books and other recovery literature.  C. Provide random alcohol and drug screening.  D. IOP groups provided 1 pm - 4 pm on  M, T, Th.  E. Introduce patient to 12-step recovery groups and encourage the patient to get a sponsor.  F.  Assist patient in the development of a personal recovery plan.  G.. Teach cognitive behavioral techniques to dispute irrational beliefs.  H. Family is invited to attend weekly family education sessions every week.  I. Provide progress reports as needed.  J. Facilitate coordination, communication, and collaboration with consumers, providers, ancillary services, and others in order to achieve goals and maximize positive  outcomes based upon the individual assessment.                   MD Signature      Date/ Time         Therapist Signature    Date/Time      RN Signature    Date/Time       Discharge Criteria Plan: Patient to complete treatment objectives while displaying regular attendance and negative drug/alcohol screens.       I have discussed and reviewed this treatment plan with the patient.  It has been printed for signatures.

## 2018-08-01 NOTE — PROGRESS NOTES
NAME: Veronica Moore  Date: 08/01/18  Phase I 8:30 am - 11:30 am     IOP GROUP NOTE     DATA:     3 hour IOP group therapy session (Check ins, Freedom from active addiction, Coping Skills )     Hour 1:Therapist facilitated discussion of the daily motivation passage from the “Daily Reflections” ([employer] (1991). Therapist continued facilitation of rapport building strategies between group members. Therapist asked that each patient check in with home life and recovery efforts and identify triggers, cravings, and high risk situations that arise between group sessions. Members discussed barriers and benefits of attending 12 step meetings. Therapist provided empathy and support during group meeting.  “Narcotics Anonymous offers only one promise and that is freedom from active addiction, the solution that eluded us for so long.”  Basic Text, p. 106  Hour 2:  Therapist facilitated a group discussion on forgiveness of the self and others           Hour 3: Therapist facilitated a group discussion on Guilt/Shame associated with forgiveness.  RESPONSE: Pt shares openly with the group.  She is processing forgiveness of others for past hurts and appears to be taking responsibility for personal life choices.  ASSESSMENT:     Lab Review  negative      Mental Status Exam  Hygiene:  good  Dress: alcohol  Attitude: cooperative and agreeable   Motor Activity: appropriate  Speech: regular rate and rhythm   Mood:  calm and conversant  Affect:  Appropriate  Thought Processes:  Goal directed and Linear  Thought Content:  Normal and Mood congurent  Suicidal Thoughts:  denies  Homicidal Thoughts:  denies  Crisis Safety Plan: yes, to come to the emergency room.  Hallucinations:  denies  Reliability: adequate  Insight: adequate  Judgement: adequate  Impulse Control: adequate     Patient's Support Network Includes: The patient lacks significant family support.     Progress toward goal: Not at goal     FUNCTIONING ASSESSMENT:  Community  Living Skills: Within functional limits  Interpersonal Skills:  Within functional limits  Health and Physical  Within functional limits  Psychological State:  Within functional limits     Prognosis: Guarded with Ongoing Treatment     Family issues related to recovery:  Not known     12-Step attendance: yes, 3 x's a day     Sponsor:  no,  Have you made contact with  him/her this past week?  . Has one weeks to get a sponsor     Identification of environmental and personal barriers to recovery: coping with limited emotions, remorse, guilt, work, family, overall understanding of disease of addiction      Motivation for treatment:  healthy lifestyle, long-term, recovery, and improving overall relationships     Impression/Formulation:    ICD-10-CM ICD-9-CM   1. Uncomplicated alcohol dependence (CMS/HCC) F10.20 303.90   2. Moderate episode of recurrent major depressive disorder (CMS/HCC) F33.1 296.32   3. Cigarette nicotine dependence without complication F17.210 305.1   4. Medication management Z79.899 V58.69     CLINICAL MANUVERING/INTERVENTIONS: CBT and group interaction activites utilized to stress relapse recovery/prevention. Patient to actively participate in activity, engage verbally with peers and staff, display an appropriate affect, keep conversation appropriate to topic, and display no negative or impulsive behaviors. Member was encouraged to bring family members for educational group on Wednesdays and Thursday. Member was provided with encouragement and unconditional positive regard. Member was encouraged to continue participation with 12-step meetings and maintaining positive daily choices.       INTERVENTIONS:  CBT and group interaction activites utilized to stress relapse recovery/prevention. Patient to actively participate in activity, engage verbally with peers and staff, display an appropriate affect, keep conversation appropriate to topic, and display no negative or impulsive behaviors. Member was  encouraged to bring family members for educational group on Wednesdays and Thursday. Member was provided with encouragement and unconditional positive regard. Member was encouraged to continue participation with 12-step meetings and maintaining positive daily choices.      BASELINE SCORES 7/19/18  DASES                   48  URICA                   12 PREPARATION  AUDIT                   37  DAST: pending    PLAN:  Continue [facility] IOP Phase I.   Aftercare:  [facility] IOP Phase II  Program Assignments:  Personal Journal, attendance of 12-step meetings, drug screens        This document signed by Sanjuana Arthur [facility]

## 2018-08-01 NOTE — PROGRESS NOTES
NAME: Veronica Moore  Date: 07/30/18  Phase I 1:00 pm - 4:00 pm    IOP GROUP NOTE     DATA:     3 hour IOP group therapy session (Check ins, Inventory, Coping Skills )     Hour 1:Therapist facilitated discussion of the daily motivation passage from the “Daily Reflections” ([employer] (1991). Therapist continued facilitation of rapport building strategies between group members. Therapist asked that each patient check in with home life and recovery efforts and identify triggers, cravings, and high risk situations that arise between group sessions. Members discussed barriers and benefits of attending 12 step meetings. Therapist provided empathy and support during group meeting.  “Continuing to take a personal inventory means that we form a habit of looking at ourselves, our actions, attitudes, and relationships on a regular basis.”  Basic Text, p. 42  Hour 2/3:  Therapist facilitated a group session on forgiveness, guilt/shame by showing the movie, The Shack.    RESPONSE: Veronica indicates that her recovery is worth it because her family deserves the best version of herself.  She feels thankful because she is here IOP today and that she is sober.  She is struggling with finances and cravings.  She rates her cravings a 4 out of 10 on a scale of 1-10.  She is responding well to the naltrexone but is interested in video trauma.  She acknowledges that APA is pending.  She is going to her meetings at least 3 times per week but is still struggling to find a sponsor.  She indicates that she has problems asking people for help because she fears rejection.  She indicates that she is feeling a little bit depressed but denies that it is affecting her daily life.  She denies relapse and indicates that she has been 28 days sober.     ASSESSMENT:     Lab Review  negative      Mental Status Exam  Hygiene:  good  Dress: alcohol  Attitude: cooperative and agreeable   Motor Activity: appropriate  Speech: regular rate and rhythm   Mood:   calm and conversant  Affect:  Appropriate  Thought Processes:  Goal directed and Linear  Thought Content:  Normal and Mood congurent  Suicidal Thoughts:  denies  Homicidal Thoughts:  denies  Crisis Safety Plan: yes, to come to the emergency room.  Hallucinations:  denies  Reliability: adequate  Insight: adequate  Judgement: adequate  Impulse Control: adequate     Patient's Support Network Includes: The patient lacks significant family support.     Progress toward goal: Not at goal     FUNCTIONING ASSESSMENT:  Community Living Skills: Within functional limits  Interpersonal Skills:  Within functional limits  Health and Physical  Within functional limits  Psychological State:  Within functional limits     Prognosis: Guarded with Ongoing Treatment     Family issues related to recovery:  Not known     12-Step attendance: first IOP group session     Sponsor:  no,  Have you made contact with  him/her this past week?  . Has two weeks to get a sponsor     Identification of environmental and personal barriers to recovery: coping with limited emotions, remorse, guilt, work, family, overall understanding of disease of addiction      Motivation for treatment:  healthy lifestyle, long-term, recovery, and improving overall relationships     Impression/Formulation:    ICD-10-CM ICD-9-CM   1. Uncomplicated alcohol dependence (CMS/HCC) F10.20 303.90   2. Moderate episode of recurrent major depressive disorder (CMS/HCC) F33.1 296.32   3. Cigarette nicotine dependence without complication F17.210 305.1   4. Medication management Z79.899 V58.69           CLINICAL MANUVERING/INTERVENTIONS: CBT and group interaction activites utilized to stress relapse recovery/prevention. Patient to actively participate in activity, engage verbally with peers and staff, display an appropriate affect, keep conversation appropriate to topic, and display no negative or impulsive behaviors. Member was encouraged to bring family members for educational  group on Wednesdays and Thursday. Member was provided with encouragement and unconditional positive regard. Member was encouraged to continue participation with 12-step meetings and maintaining positive daily choices.       INTERVENTIONS:  CBT and group interaction activites utilized to stress relapse recovery/prevention. Patient to actively participate in activity, engage verbally with peers and staff, display an appropriate affect, keep conversation appropriate to topic, and display no negative or impulsive behaviors. Member was encouraged to bring family members for educational group on Wednesdays and Thursday. Member was provided with encouragement and unconditional positive regard. Member was encouraged to continue participation with 12-step meetings and maintaining positive daily choices.      BASELINE SCORES 7/19/18  DASES                   48  URICA                   12 PREPARATION  AUDIT                   37  DAST: pending    PLAN:  Continue [facility] IOP Phase I.   Aftercare:  [facility] IOP Phase II  Program Assignments:  Personal Journal, attendance of 12-step meetings, drug screens        This document signed by Sanjuana [facility]
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
## 2018-08-02 ENCOUNTER — OFFICE VISIT (OUTPATIENT)
Dept: PSYCHIATRY | Facility: HOSPITAL | Age: 23
End: 2018-08-02

## 2018-08-02 ENCOUNTER — LAB (OUTPATIENT)
Dept: LAB | Facility: HOSPITAL | Age: 23
End: 2018-08-02

## 2018-08-02 ENCOUNTER — DOCUMENTATION (OUTPATIENT)
Dept: PSYCHIATRY | Facility: HOSPITAL | Age: 23
End: 2018-08-02

## 2018-08-02 DIAGNOSIS — F10.20 UNCOMPLICATED ALCOHOL DEPENDENCE (HCC): Primary | ICD-10-CM

## 2018-08-02 DIAGNOSIS — F10.20 ACUTE ALCOHOLISM (HCC): ICD-10-CM

## 2018-08-02 DIAGNOSIS — Z79.899 MEDICATION MANAGEMENT: ICD-10-CM

## 2018-08-02 DIAGNOSIS — F33.1 MODERATE EPISODE OF RECURRENT MAJOR DEPRESSIVE DISORDER (HCC): ICD-10-CM

## 2018-08-02 DIAGNOSIS — F17.210 CIGARETTE SMOKER: ICD-10-CM

## 2018-08-02 DIAGNOSIS — F17.210 CIGARETTE NICOTINE DEPENDENCE WITHOUT COMPLICATION: ICD-10-CM

## 2018-08-02 PROCEDURE — 80307 DRUG TEST PRSMV CHEM ANLYZR: CPT

## 2018-08-02 NOTE — PROGRESS NOTES
Continuing Care  Staffing Review Form    8/2/2018     Any inappropriate use of alcohol or drugs since last session?  No    How many 12 - step meeting have you attended since last session? 3-5 times per week    Do you have a sponsor?  No.  She has made contact with a possible temporary sponsor    Which of the following has been a problem for you this past week:  Cravings, Too little free time and Work issues    On a scale of 1 -10  (1= none and 10 = near relapse)  where you see yourself on the relapse scale:2    Staff notes Patient notes PTSD dreams. She will be seeing her private psychiatrist for med mgmt.  Pt is doing well overall.  She is compliant and open to group processes.  Pt's PA for Vivitrol is pending insurance.       Your medication list          Accurate as of 8/2/18  2:25 PM. If you have any questions, ask your nurse or doctor.               CONTINUE taking these medications      Instructions Last Dose Given Next Dose Due   ARIPiprazole 5 MG tablet  Commonly known as:  ABILIFY      TAKE ONE TABLET BY MOUTH EVERY DAY FOR MOOD       escitalopram 20 MG tablet  Commonly known as:  LEXAPRO      TAKE 1   1 2 TABLETS BY MOUTH EVERY DAY FOR MOOD       hydrOXYzine 10 MG tablet  Commonly known as:  ATARAX      Take 10 mg by mouth 3 (Three) Times a Day.       levothyroxine 25 MCG tablet  Commonly known as:  SYNTHROID, LEVOTHROID      TAKE ONE TABLET BY MOUTH EVERY DAY FOR THYROID       levothyroxine 100 MCG tablet  Commonly known as:  SYNTHROID, LEVOTHROID      Take 100 mcg by mouth Daily.       Naltrexone 380 MG reconstituted suspension IM suspension  Commonly known as:  VIVITROL      Inject 380 mg into the appropriate muscle as directed by prescriber Every 28 (Twenty-Eight) Days.       naproxen 500 MG tablet  Commonly known as:  NAPROSYN      TK 1 T PO  BID PRF PAIN       TAYTULLA 1-20 MG-MCG(24) capsule  Generic drug:  Norethin Ace-Eth Estrad-FE      Take 1 capsule by mouth Daily.

## 2018-08-02 NOTE — PROGRESS NOTES
I have reviewed the notes, assessments, and/or procedures performed by Camille Arthur [facility], I concur with her/his documentation of Veronica Moore.

## 2018-08-03 LAB — 1,25(OH)2D3 SERPL-MCNC: 44.9 PG/ML (ref 19.9–79.3)

## 2018-08-06 ENCOUNTER — LAB (OUTPATIENT)
Dept: LAB | Facility: HOSPITAL | Age: 23
End: 2018-08-06

## 2018-08-06 ENCOUNTER — OFFICE VISIT (OUTPATIENT)
Dept: PSYCHIATRY | Facility: HOSPITAL | Age: 23
End: 2018-08-06

## 2018-08-06 DIAGNOSIS — F17.210 CIGARETTE SMOKER: ICD-10-CM

## 2018-08-06 DIAGNOSIS — F33.1 MODERATE EPISODE OF RECURRENT MAJOR DEPRESSIVE DISORDER (HCC): ICD-10-CM

## 2018-08-06 DIAGNOSIS — F10.20 UNCOMPLICATED ALCOHOL DEPENDENCE (HCC): Primary | ICD-10-CM

## 2018-08-06 DIAGNOSIS — Z79.899 MEDICATION MANAGEMENT: ICD-10-CM

## 2018-08-06 DIAGNOSIS — F10.20 ACUTE ALCOHOLISM (HCC): ICD-10-CM

## 2018-08-06 DIAGNOSIS — F17.210 CIGARETTE NICOTINE DEPENDENCE WITHOUT COMPLICATION: ICD-10-CM

## 2018-08-06 PROCEDURE — 80307 DRUG TEST PRSMV CHEM ANLYZR: CPT

## 2018-08-06 PROCEDURE — 99214 OFFICE O/P EST MOD 30 MIN: CPT | Performed by: NURSE PRACTITIONER

## 2018-08-06 RX ORDER — PRAZOSIN HYDROCHLORIDE 1 MG/1
1 CAPSULE ORAL NIGHTLY
Qty: 30 CAPSULE | Refills: 0 | Status: SHIPPED | OUTPATIENT
Start: 2018-08-06 | End: 2018-08-21 | Stop reason: SDUPTHER

## 2018-08-06 NOTE — PROGRESS NOTES
Subjective   Veronica Moore is a 22 y.o. female is here today for medication management follow-up.    Chief Complaint:      History of Present Illness:  Pt has been having severe nightmares since stopping alcohol.  Would like to try prazosin.  Rates both anxiety and depression a 4/10 with 10 being worse.  No suicidal thoughts, homicidal thoughts, or A/V hallucinations.  Appetite has been stable. Pt is still taking Naltrexone tablet.  It does help with cravings, would like the Vivitrol injection and says that it has been denied through her insurance.  Pt is on oral contraceptives.  Patient denies any negative side effects to any of the medications.    The following portions of the patient's history were reviewed and updated as appropriate: allergies, current medications, past family history, past medical history, past social history, past surgical history and problem list.    Review of Systems   Constitutional: Negative for activity change, appetite change and fatigue.   HENT: Negative.    Eyes: Negative for visual disturbance.   Respiratory: Negative.    Cardiovascular: Negative.    Gastrointestinal: Negative for nausea.   Endocrine: Negative.    Genitourinary: Negative.    Musculoskeletal: Negative for arthralgias.   Skin: Negative.    Allergic/Immunologic: Negative.    Neurological: Negative for dizziness, seizures and headaches.   Hematological: Negative.    Psychiatric/Behavioral: Positive for sleep disturbance. Negative for agitation, behavioral problems, confusion, decreased concentration, dysphoric mood, hallucinations, self-injury and suicidal ideas. The patient is not nervous/anxious and is not hyperactive.        Objective   Physical Exam   Constitutional: She appears well-developed and well-nourished.   Psychiatric: She has a normal mood and affect. Her speech is normal and behavior is normal. Judgment and thought content normal. Cognition and memory are normal.   Vitals reviewed.    There were no  vitals taken for this visit.    Medication List:   Current Outpatient Prescriptions   Medication Sig Dispense Refill   • ARIPiprazole (ABILIFY) 5 MG tablet TAKE ONE TABLET BY MOUTH EVERY DAY FOR MOOD  1   • escitalopram (LEXAPRO) 20 MG tablet TAKE 1   1 2 TABLETS BY MOUTH EVERY DAY FOR MOOD  2   • hydrOXYzine (ATARAX) 10 MG tablet Take 10 mg by mouth 3 (Three) Times a Day.  1   • levothyroxine (SYNTHROID, LEVOTHROID) 100 MCG tablet Take 100 mcg by mouth Daily.     • levothyroxine (SYNTHROID, LEVOTHROID) 25 MCG tablet TAKE ONE TABLET BY MOUTH EVERY DAY FOR THYROID  5   • Naltrexone (VIVITROL) 380 MG reconstituted suspension IM suspension Inject 380 mg into the appropriate muscle as directed by prescriber Every 28 (Twenty-Eight) Days. 1 each 1   • naproxen (NAPROSYN) 500 MG tablet TK 1 T PO  BID PRF PAIN  0   • TAYTULLA 1-20 MG-MCG(24) capsule Take 1 capsule by mouth Daily.  4     Current Facility-Administered Medications   Medication Dose Route Frequency Provider Last Rate Last Dose   • Naltrexone (VIVITROL) IM suspension 380 mg  380 mg Intramuscular Q28 Days Carol Ramirez, JOHNATHON           Mental Status Exam:   Hygiene:   good  Cooperation:  Cooperative  Eye Contact:  Good  Psychomotor Behavior:  Appropriate  Affect:  Full range  Hopelessness: Denies  Speech:  Normal  Thought Process:  Goal directed  Thought Content:  Normal  Suicidal:  None  Homicidal:  None  Hallucinations:  None  Delusion:  None  Memory:  Intact  Orientation:  Person, Place, Time and Situation  Reliability:  good  Insight:  Good  Judgement:  Good  Impulse Control:  Good  Physical/Medical Issues:  hypothyroidism, anxiety and depression    Assessment/Plan   Problems Addressed this Visit     None      Visit Diagnoses     Uncomplicated alcohol dependence (CMS/HCC)    -  Primary    Moderate episode of recurrent major depressive disorder (CMS/HCC)        Cigarette nicotine dependence without complication        Medication management                 Functionality: pt having significant impairment in important areas of daily functioning.  Prognosis: Guarded dependent on medication/follow up and treatment plan compliance.  Discussed medication options.  At this time I'm going to go ahead and initiate her prazosin for nightmares.  Also we are going to see in the preauthorization and for the Detrol as patient has already completed a naloxone challenge.  Hopefully we can get her the shot either today or tomorrow.  Patient is to continue with her birth control and she knows the risks involved with the naltrexone and pregnancy.  She is to continue in our IOP program.  He denies needing any changes on her other antidepressant antianxiety medication as this is controlled through her historical provider.  Reviewed the risks, benefits, and side effects of the medications; patient acknowledged and verbally consented.  Patient is agreeable to call the [facility].  Patient is aware to call 911 or go to the nearest ER should begin having SI/HI.

## 2018-08-07 ENCOUNTER — OFFICE VISIT (OUTPATIENT)
Dept: PSYCHIATRY | Facility: HOSPITAL | Age: 23
End: 2018-08-07

## 2018-08-07 ENCOUNTER — LAB (OUTPATIENT)
Dept: LAB | Facility: HOSPITAL | Age: 23
End: 2018-08-07

## 2018-08-07 DIAGNOSIS — F17.210 CIGARETTE NICOTINE DEPENDENCE WITHOUT COMPLICATION: ICD-10-CM

## 2018-08-07 DIAGNOSIS — F17.210 CIGARETTE SMOKER: ICD-10-CM

## 2018-08-07 DIAGNOSIS — F10.20 UNCOMPLICATED ALCOHOL DEPENDENCE (HCC): Primary | ICD-10-CM

## 2018-08-07 DIAGNOSIS — F10.20 ACUTE ALCOHOLISM (HCC): ICD-10-CM

## 2018-08-07 DIAGNOSIS — F33.1 MODERATE EPISODE OF RECURRENT MAJOR DEPRESSIVE DISORDER (HCC): ICD-10-CM

## 2018-08-07 DIAGNOSIS — Z79.899 MEDICATION MANAGEMENT: ICD-10-CM

## 2018-08-07 PROCEDURE — 80307 DRUG TEST PRSMV CHEM ANLYZR: CPT

## 2018-08-08 NOTE — PROGRESS NOTES
"NAME: Veronica Moore  Date: 08/02/18  Phase I 1:00 pm - 4:00 pm   IOP GROUP NOTE     DATA:     3 hour IOP group therapy session (Check ins, Practicing Honest, Coping Skills )     Hour 1:Therapist facilitated discussion of the daily motivation passage from the “Daily Reflections” ([employer] (1991). Therapist continued facilitation of rapport building strategies between group members. Therapist asked that each patient check in with home life and recovery efforts and identify triggers, cravings, and high risk situations that arise between group sessions. Members discussed barriers and benefits of attending 12 step meetings. Therapist provided empathy and support during group meeting.  “When we feel trapped or pressured, it takes great spiritual and emotional strength to be honest.”  Basic Text, p. 85  Hour 2:  Recreational Therapist, Alonzo Laughlin, provided a group experience centered on therapeutic \"fun\".  Members were encouraged, through music, to find healthy ways of coping with life and relapse triggers.     Hour 3: Therapist facilitated a meeting on the basics of a 12 step program.  RESPONSE: Veronica presents on time and interacts appropriately.  She is experiencing PTSD dreams and is struggling with finances.  She denies relapse.     ASSESSMENT:     Lab Review  negative     Mental Status Exam  Hygiene:  good  Dress: alcohol  Attitude: cooperative and agreeable   Motor Activity: appropriate  Speech: regular rate and rhythm   Mood:  calm and conversant  Affect:  Appropriate  Thought Processes:  Goal directed and Linear  Thought Content:  Normal and Mood congurent  Suicidal Thoughts:  denies  Homicidal Thoughts:  denies  Crisis Safety Plan: yes, to come to the emergency room.  Hallucinations:  denies  Reliability: adequate  Insight: adequate  Judgement: adequate  Impulse Control: adequate     Patient's Support Network Includes: The patient lacks significant family support.     Progress toward goal: Not at " goal     FUNCTIONING ASSESSMENT:  Community Living Skills: Within functional limits  Interpersonal Skills:  Within functional limits  Health and Physical  Within functional limits  Psychological State:  Within functional limits     Prognosis: Guarded with Ongoing Treatment     Family issues related to recovery:  Not known     12-Step attendance: yes, 3 x's a day     Sponsor:  no,  Have you made contact with  him/her this past week?  . Has one weeks to get a sponsor     Identification of environmental and personal barriers to recovery: coping with limited emotions, remorse, guilt, work, family, overall understanding of disease of addiction      Motivation for treatment:  healthy lifestyle, long-term, recovery, and improving overall relationships     Impression/Formulation:    ICD-10-CM ICD-9-CM   1. Uncomplicated alcohol dependence (CMS/HCC) F10.20 303.90   2. Moderate episode of recurrent major depressive disorder (CMS/HCC) F33.1 296.32   3. Cigarette nicotine dependence without complication F17.210 305.1     CLINICAL MANUVERING/INTERVENTIONS: CBT and group interaction activites utilized to stress relapse recovery/prevention. Patient to actively participate in activity, engage verbally with peers and staff, display an appropriate affect, keep conversation appropriate to topic, and display no negative or impulsive behaviors. Member was encouraged to bring family members for educational group on Wednesdays and Thursday. Member was provided with encouragement and unconditional positive regard. Member was encouraged to continue participation with 12-step meetings and maintaining positive daily choices.       INTERVENTIONS:  CBT and group interaction activites utilized to stress relapse recovery/prevention. Patient to actively participate in activity, engage verbally with peers and staff, display an appropriate affect, keep conversation appropriate to topic, and display no negative or impulsive behaviors. Member was  encouraged to bring family members for educational group on Wednesdays and Thursday. Member was provided with encouragement and unconditional positive regard. Member was encouraged to continue participation with 12-step meetings and maintaining positive daily choices.      BASELINE SCORES 7/19/18  DASES                   48  URICA                   12 PREPARATION  AUDIT                   37  DAST: pending  UPDATED SCORES na    PLAN:  Continue [facility] IOP Phase I.   Aftercare:  [facility] IOP Phase II  Program Assignments:  Personal Journal, attendance of 12-step meetings, drug screens        This document signed by Sanjuana Arthur [facility]

## 2018-08-09 ENCOUNTER — LAB (OUTPATIENT)
Dept: LAB | Facility: HOSPITAL | Age: 23
End: 2018-08-09

## 2018-08-09 ENCOUNTER — OFFICE VISIT (OUTPATIENT)
Dept: PSYCHIATRY | Facility: HOSPITAL | Age: 23
End: 2018-08-09

## 2018-08-09 DIAGNOSIS — Z79.899 MEDICATION MANAGEMENT: ICD-10-CM

## 2018-08-09 DIAGNOSIS — F17.210 CIGARETTE SMOKER: ICD-10-CM

## 2018-08-09 DIAGNOSIS — F33.1 MODERATE EPISODE OF RECURRENT MAJOR DEPRESSIVE DISORDER (HCC): ICD-10-CM

## 2018-08-09 DIAGNOSIS — F10.20 UNCOMPLICATED ALCOHOL DEPENDENCE (HCC): Primary | ICD-10-CM

## 2018-08-09 DIAGNOSIS — F10.20 ACUTE ALCOHOLISM (HCC): ICD-10-CM

## 2018-08-09 DIAGNOSIS — F17.210 CIGARETTE NICOTINE DEPENDENCE WITHOUT COMPLICATION: ICD-10-CM

## 2018-08-09 PROCEDURE — 80307 DRUG TEST PRSMV CHEM ANLYZR: CPT

## 2018-08-13 ENCOUNTER — OFFICE VISIT (OUTPATIENT)
Dept: PSYCHIATRY | Facility: HOSPITAL | Age: 23
End: 2018-08-13

## 2018-08-13 ENCOUNTER — LAB (OUTPATIENT)
Dept: LAB | Facility: HOSPITAL | Age: 23
End: 2018-08-13

## 2018-08-13 DIAGNOSIS — F33.1 MODERATE EPISODE OF RECURRENT MAJOR DEPRESSIVE DISORDER (HCC): ICD-10-CM

## 2018-08-13 DIAGNOSIS — F17.210 CIGARETTE SMOKER: ICD-10-CM

## 2018-08-13 DIAGNOSIS — F10.20 UNCOMPLICATED ALCOHOL DEPENDENCE (HCC): Primary | ICD-10-CM

## 2018-08-13 DIAGNOSIS — Z79.899 MEDICATION MANAGEMENT: ICD-10-CM

## 2018-08-13 DIAGNOSIS — F17.210 CIGARETTE NICOTINE DEPENDENCE WITHOUT COMPLICATION: ICD-10-CM

## 2018-08-13 DIAGNOSIS — F10.20 ACUTE ALCOHOLISM (HCC): ICD-10-CM

## 2018-08-13 PROCEDURE — 80307 DRUG TEST PRSMV CHEM ANLYZR: CPT

## 2018-08-14 ENCOUNTER — OFFICE VISIT (OUTPATIENT)
Dept: PSYCHIATRY | Facility: HOSPITAL | Age: 23
End: 2018-08-14

## 2018-08-14 ENCOUNTER — LAB (OUTPATIENT)
Dept: LAB | Facility: HOSPITAL | Age: 23
End: 2018-08-14

## 2018-08-14 DIAGNOSIS — Z79.899 MEDICATION MANAGEMENT: ICD-10-CM

## 2018-08-14 DIAGNOSIS — F10.20 UNCOMPLICATED ALCOHOL DEPENDENCE (HCC): Primary | ICD-10-CM

## 2018-08-14 DIAGNOSIS — F10.20 ACUTE ALCOHOLISM (HCC): ICD-10-CM

## 2018-08-14 DIAGNOSIS — F17.210 CIGARETTE SMOKER: ICD-10-CM

## 2018-08-14 DIAGNOSIS — F17.210 CIGARETTE NICOTINE DEPENDENCE WITHOUT COMPLICATION: ICD-10-CM

## 2018-08-14 DIAGNOSIS — F33.1 MODERATE EPISODE OF RECURRENT MAJOR DEPRESSIVE DISORDER (HCC): ICD-10-CM

## 2018-08-14 DIAGNOSIS — F19.10 SUBSTANCE ABUSE (HCC): Primary | ICD-10-CM

## 2018-08-14 PROCEDURE — 80307 DRUG TEST PRSMV CHEM ANLYZR: CPT

## 2018-08-14 NOTE — PROGRESS NOTES
NAME: Veronica Moore  Date: 08/06/18  Phase I 8:30 am - 11:30 am     IOP GROUP NOTE     DATA:     3 hour IOP group therapy session (Check ins, Jocelyn, Coping Skills )     Hour 1:Therapist facilitated discussion of the daily motivation passage from the “Daily Reflections” ([employer] (1991). Therapist continued facilitation of rapport building strategies between group members. Therapist asked that each patient check in with home life and recovery efforts and identify triggers, cravings, and high risk situations that arise between group sessions. Members discussed barriers and benefits of attending 12 step meetings. Therapist provided empathy and support during group meeting.  “Since the beginning of our recovery, we have found that jocelyn doesn’t come from material things but from within ourselves.”  Basic Text, p. 107     Hour 2/3:  Therapist facilitated a group introducing individuals to the emotional module by showing them the movie Inside Out.    RESPONSE: She feels thankful for her sobriety.  She feels healthier and better able to make good decisions     ASSESSMENT:     Lab Review  negative      Mental Status Exam  Hygiene:  good  Dress: alcohol  Attitude: cooperative and agreeable   Motor Activity: appropriate  Speech: regular rate and rhythm   Mood:  calm and conversant  Affect:  Appropriate  Thought Processes:  Goal directed and Linear  Thought Content:  Normal and Mood congurent  Suicidal Thoughts:  denies  Homicidal Thoughts:  denies  Crisis Safety Plan: yes, to come to the emergency room.  Hallucinations:  denies  Reliability: adequate  Insight: adequate  Judgement: adequate  Impulse Control: adequate     Patient's Support Network Includes: The patient lacks significant family support.     Progress toward goal: Not at goal     FUNCTIONING ASSESSMENT:  Community Living Skills: Within functional limits  Interpersonal Skills:  Within functional limits  Health and Physical  Within functional limits  Psychological  State:  Within functional limits     Prognosis: Guarded with Ongoing Treatment     Family issues related to recovery:  Not known     12-Step attendance: yes, 4-6 x's per week     Sponsor:  yes  Have you made contact with  him/her this past week? daily   Identification of environmental and personal barriers to recovery: coping with limited emotions, remorse, guilt, work, family, overall understanding of disease of addiction      Motivation for treatment:  healthy lifestyle, long-term, recovery, and improving overall relationships     Impression/Formulation:    ICD-10-CM ICD-9-CM   1. Uncomplicated alcohol dependence (CMS/HCC) F10.20 303.90   2. Moderate episode of recurrent major depressive disorder (CMS/HCC) F33.1 296.32   3. Cigarette nicotine dependence without complication F17.210 305.1   4. Medication management Z79.899 V58.69     CLINICAL MANUVERING/INTERVENTIONS: CBT and group interaction activites utilized to stress relapse recovery/prevention. Patient to actively participate in activity, engage verbally with peers and staff, display an appropriate affect, keep conversation appropriate to topic, and display no negative or impulsive behaviors. Member was encouraged to bring family members for educational group on Wednesdays and Thursday. Member was provided with encouragement and unconditional positive regard. Member was encouraged to continue participation with 12-step meetings and maintaining positive daily choices.       INTERVENTIONS:  CBT and group interaction activites utilized to stress relapse recovery/prevention. Patient to actively participate in activity, engage verbally with peers and staff, display an appropriate affect, keep conversation appropriate to topic, and display no negative or impulsive behaviors. Member was encouraged to bring family members for educational group on Wednesdays and Thursday. Member was provided with encouragement and unconditional positive regard. Member was encouraged  to continue participation with 12-step meetings and maintaining positive daily choices.      BASELINE SCORES 7/19/18  DASES                   48  URICA                   12 PREPARATION  AUDIT                   37  DAST: pending  UPDATED SCORES na    PLAN:  Continue [facility] IOP Phase I.   Aftercare:  [facility] IOP Phase II  Program Assignments:  Personal Journal, attendance of 12-step meetings, drug screens        This document signed by Sanjuana Arthur [facility]

## 2018-08-14 NOTE — PROGRESS NOTES
"NAME: Veronica Moore  Date: 08/09/18  Phase I 1:00 pm - 4:00 pm    IOP GROUP NOTE     DATA:     3 hour IOP group therapy session (Check ins, Coping Skills )     Hour 1:Therapist facilitated discussion of the daily motivation passage from the “Daily Reflections” ([employer] (1991). Therapist continued facilitation of rapport building strategies between group members. Therapist asked that each patient check in with home life and recovery efforts and identify triggers, cravings, and high risk situations that arise between group sessions. Members discussed barriers and benefits of attending 12 step meetings. Therapist provided empathy and support during group meeting.    Hour 2:  Recreational Therapist, Octavia Vera, provided a group experience centered on therapeutic \"fun\".  Members were encouraged, through music, to find healthy ways of coping with life and relapse triggers.     Hour 3: Therapist facilitated a meeting on recognizing past behaviors that have contributed to damaged relationships and discovering ways to cope with life stressors.  She denies depression.  She denies anxiety.  She denies high risk situations.  She denies triggers. She encourages others and is processing work related stressors without relapse.     ASSESSMENT:      Lab Review  negative alcohol         Mental Status Exam  Hygiene:  good  Dress: alcohol  Attitude: cooperative and agreeable   Motor Activity: appropriate  Speech: regular rate and rhythm   Mood:  calm and conversant  Affect:  Appropriate  Thought Processes:  Goal directed and Linear  Thought Content:  Normal and Mood congurent  Suicidal Thoughts:  denies  Homicidal Thoughts:  denies  Crisis Safety Plan: yes, to come to the emergency room.  Hallucinations:  denies  Reliability: adequate  Insight: adequate  Judgement: adequate  Impulse Control: adequate     Patient's Support Network Includes: The patient lacks significant family support.     Progress toward goal: Not at " goal     FUNCTIONING ASSESSMENT:  Community Living Skills: Within functional limits  Interpersonal Skills:  Within functional limits  Health and Physical  Within functional limits  Psychological State:  Within functional limits     Prognosis: Guarded with Ongoing Treatment     Family issues related to recovery:  Not known     12-Step attendance: yes, 4-6 x's per week     Sponsor:  yes  Have you made contact with  him/her this past week? daily   Identification of environmental and personal barriers to recovery: coping with limited emotions, remorse, guilt, work, family, overall understanding of disease of addiction      Motivation for treatment:  healthy lifestyle, long-term, recovery, and improving overall relationships     Impression/Formulation:    ICD-10-CM ICD-9-CM   1. Uncomplicated alcohol dependence (CMS/HCC) F10.20 303.90   2. Moderate episode of recurrent major depressive disorder (CMS/HCC) F33.1 296.32   3. Cigarette nicotine dependence without complication F17.210 305.1   4. Medication management Z79.899 V58.69       CLINICAL MANUVERING/INTERVENTIONS: CBT and group interaction activites utilized to stress relapse recovery/prevention. Patient to actively participate in activity, engage verbally with peers and staff, display an appropriate affect, keep conversation appropriate to topic, and display no negative or impulsive behaviors. Member was encouraged to bring family members for educational group on Wednesdays and Thursday. Member was provided with encouragement and unconditional positive regard. Member was encouraged to continue participation with 12-step meetings and maintaining positive daily choices.       BASELINE SCORES 7/19/18  DASES                   48  URICA                   12 PREPARATION  AUDIT                   37  DAST: pending  UPDATED SCORES na  PLAN:  Continue [facility] IOP Phase I.   Aftercare:  [facility] IOP Phase II  Program  Assignments:  Personal Journal, attendance of 12-step meetings, drug screens        This document signed by Sanjuana Arthur [facility]

## 2018-08-14 NOTE — PROGRESS NOTES
NAME: Veronica Moore  Date: 08/07/18  Phase I 1:00 pm - 4:00 pm    IOP GROUP NOTE    DATA:     3 hour IOP group therapy session (Check ins, Gratitude, Coping Skills )     Hour 1:Therapist facilitated discussion of the daily motivation passage from the “Daily Reflections” ([employer] (1991). Therapist continued facilitation of rapport building strategies between group members. Therapist asked that each patient check in with home life and recovery efforts and identify triggers, cravings, and high risk situations that arise between group sessions. Members discussed barriers and benefits of attending 12 step meetings. Therapist provided empathy and support during group meeting.  We focus on anything that isn’t going our way and ignore all the beauty in our lives.”  Basic Text, p. 80    Hour 2:  Therapist facilitated a group discussion on the pros and cons of emotional regulation     Hour 3: Group participants explored their feelings and emotions by playing a card game which allowed them to express and share their feelings in a non-confrontational way.  RESPONSE: Veronica presents on time and interacts appropriately within all group processes.  She notes that her recovery is worth it because she feels that she is having new opportunities coming her way.  he is struggling with finances because she is currently on leave from work.  She notes that she is sleeping okay and she is eating okay.  She denies depression.  She denies anxiety.  She denies high risk situations.  She denies triggers.     ASSESSMENT:      Lab Review  negative alcohol        Mental Status Exam  Hygiene:  good  Dress: alcohol  Attitude: cooperative and agreeable   Motor Activity: appropriate  Speech: regular rate and rhythm   Mood:  calm and conversant  Affect:  Appropriate  Thought Processes:  Goal directed and Linear  Thought Content:  Normal and Mood congurent  Suicidal Thoughts:  denies  Homicidal Thoughts:  denies  Crisis Safety Plan: yes, to come to  the emergency room.  Hallucinations:  denies  Reliability: adequate  Insight: adequate  Judgement: adequate  Impulse Control: adequate     Patient's Support Network Includes: The patient lacks significant family support.     Progress toward goal: Not at goal     FUNCTIONING ASSESSMENT:  Community Living Skills: Within functional limits  Interpersonal Skills:  Within functional limits  Health and Physical  Within functional limits  Psychological State:  Within functional limits     Prognosis: Guarded with Ongoing Treatment     Family issues related to recovery:  Not known     12-Step attendance: yes, 4-6 x's per week     Sponsor:  yes  Have you made contact with  him/her this past week? daily   Identification of environmental and personal barriers to recovery: coping with limited emotions, remorse, guilt, work, family, overall understanding of disease of addiction      Motivation for treatment:  healthy lifestyle, long-term, recovery, and improving overall relationships     Impression/Formulation:    ICD-10-CM ICD-9-CM   1. Uncomplicated alcohol dependence (CMS/HCC) F10.20 303.90   2. Moderate episode of recurrent major depressive disorder (CMS/HCC) F33.1 296.32   3. Cigarette nicotine dependence without complication F17.210 305.1     CLINICAL MANUVERING/INTERVENTIONS: CBT and group interaction activites utilized to stress relapse recovery/prevention. Patient to actively participate in activity, engage verbally with peers and staff, display an appropriate affect, keep conversation appropriate to topic, and display no negative or impulsive behaviors. Member was encouraged to bring family members for educational group on Wednesdays and Thursday. Member was provided with encouragement and unconditional positive regard. Member was encouraged to continue participation with 12-step meetings and maintaining positive daily choices.       BASELINE SCORES 7/19/18  DASES                   48  URICA                   12  PREPARATION  AUDIT                   37  DAST: pending  UPDATED SCORES na     PLAN:  Continue [facility] IOP Phase I.   Aftercare:  [facility] IOP Phase II  Program Assignments:  Personal Journal, attendance of 12-step meetings, drug screens        This document signed by Sanjuana Arthur [facility]

## 2018-08-15 NOTE — PROGRESS NOTES
"NAME: Veronica Moore  Date: 08/14/18  Phase I 1:00 pm - 4:00 pm    IOP GROUP NOTE    DATA:     3 hour IOP group therapy session (Check ins, Letting Go, Coping Skills )     Hour 1:Therapist facilitated discussion of the daily motivation passage from the “Daily Reflections” ([employer] (1991). Therapist continued facilitation of rapport building strategies between group members. Therapist asked that each patient check in with home life and recovery efforts and identify triggers, cravings, and high risk situations that arise between group sessions. Members discussed barriers and benefits of attending 12 step meetings. Therapist provided empathy and support during group meeting.  “We don’t have to settle for the limitations of the past.  We can examine and reexamine our old ideas.”  Basic Text, p. 11  Hour 2:  Group participants processed their self-inventories (Emotional Intelligence)    Hour 3: Group participants began viewing the movie As Good As It Gets.  They met with their  as well.    RESPONSE: Veronica shares she isn't sleeping very well due to the stress but is doing \"some better\".  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  This helped her with her self-confidence per her report.  Patient denies relapse.      ASSESSMENT:      Lab Review  negative       Mental Status Exam  Hygiene:  good  Dress: alcohol  Attitude: cooperative and agreeable   Motor Activity: appropriate  Speech: regular rate and rhythm   Mood:  calm and conversant  Affect:  Appropriate  Thought Processes:  Goal directed and Linear  Thought Content:  Normal and Mood congurent  Suicidal Thoughts:  denies  Homicidal Thoughts:  denies  Crisis Safety Plan: yes, to come to the emergency room.  Hallucinations:  denies  Reliability: adequate  Insight: adequate  Judgement: adequate  Impulse Control: adequate     Patient's Support Network Includes: The patient lacks significant family support.     Progress " toward goal: Not at goal     FUNCTIONING ASSESSMENT:  Community Living Skills: Within functional limits  Interpersonal Skills:  Within functional limits  Health and Physical  Within functional limits  Psychological State:  Within functional limits     Prognosis: Guarded with Ongoing Treatment     Family issues related to recovery:  Not known     12-Step attendance: yes, 4-6 x's per week     Sponsor:  yes  Have you made contact with  him/her this past week? daily   Identification of environmental and personal barriers to recovery: coping with limited emotions, remorse, guilt, work, family, overall understanding of disease of addiction      Motivation for treatment:  healthy lifestyle, long-term, recovery, and improving overall relationships     Impression/Formulation:     ICD-10-CM ICD-9-CM   1. Uncomplicated alcohol dependence (CMS/HCC) F10.20 303.90   2. Moderate episode of recurrent major depressive disorder (CMS/HCC) F33.1 296.32   3. Cigarette nicotine dependence without complication F17.210 305.1      CLINICAL MANUVERING/INTERVENTIONS: CBT and group interaction activites utilized to stress relapse recovery/prevention. Patient to actively participate in activity, engage verbally with peers and staff, display an appropriate affect, keep conversation appropriate to topic, and display no negative or impulsive behaviors. Member was encouraged to bring family members for educational group on Wednesdays and Thursday. Member was provided with encouragement and unconditional positive regard. Member was encouraged to continue participation with 12-step meetings and maintaining positive daily choices.       BASELINE SCORES 7/19/18  DASES                   48  URICA                   12 PREPARATION  AUDIT                   37  DAST: pending  UPDATED SCORES na  PLAN:  Continue [facility] IOP Phase I.   Aftercare:  [facility] IOP Phase II  Program Assignments:  Personal  Journal, attendance of 12-step meetings, drug screens        This document signed by Sanjuana Arthur, [facility]

## 2018-08-15 NOTE — PROGRESS NOTES
NAME: Veronica Moore  Date: 08/13/18  Phase I 8:30 am - 11:30 am    IOP GROUP NOTE    DATA:     3 hour IOP group therapy session (Check ins, Difficult People, Coping Skills )     Hour 1:Therapist facilitated discussion of the daily motivation passage from the “Daily Reflections” ([employer] (1991). Therapist continued facilitation of rapport building strategies between group members. Therapist asked that each patient check in with home life and recovery efforts and identify triggers, cravings, and high risk situations that arise between group sessions. Members discussed barriers and benefits of attending 12 step meetings. Therapist provided empathy and support during group meeting.  “By giving unconditional love... we become more loving, and by sharing spiritual growth we become more spiritual.”  Basic Text, p.103    Hour 2:  Therapist facilitated a group discussion on the purpose of emotions.    Hour 3: Participants explored Emotions and their purpose.    RESPONSE: Veronica shares she is under a lot stress (work related).  She processed the event with the group and notes that she felt better afterwards. Patient denies relapse.      ASSESSMENT:     Lab Review  negative       Mental Status Exam  Hygiene:  good  Dress: alcohol  Attitude: cooperative and agreeable   Motor Activity: appropriate  Speech: regular rate and rhythm   Mood:  calm and conversant  Affect:  Appropriate  Thought Processes:  Goal directed and Linear  Thought Content:  Normal and Mood congurent  Suicidal Thoughts:  denies  Homicidal Thoughts:  denies  Crisis Safety Plan: yes, to come to the emergency room.  Hallucinations:  denies  Reliability: adequate  Insight: adequate  Judgement: adequate  Impulse Control: adequate     Patient's Support Network Includes: The patient lacks significant family support.     Progress toward goal: Not at goal     FUNCTIONING ASSESSMENT:  Community Living Skills: Within functional limits  Interpersonal Skills:  Within  functional limits  Health and Physical  Within functional limits  Psychological State:  Within functional limits     Prognosis: Guarded with Ongoing Treatment     Family issues related to recovery:  Not known     12-Step attendance: yes, 4-6 x's per week     Sponsor:  yes  Have you made contact with  him/her this past week? daily   Identification of environmental and personal barriers to recovery: coping with limited emotions, remorse, guilt, work, family, overall understanding of disease of addiction      Motivation for treatment:  healthy lifestyle, long-term, recovery, and improving overall relationships     Impression/Formulation:     ICD-10-CM ICD-9-CM   1. Uncomplicated alcohol dependence (CMS/HCC) F10.20 303.90   2. Moderate episode of recurrent major depressive disorder (CMS/HCC) F33.1 296.32   3. Cigarette nicotine dependence without complication F17.210 305.1     CLINICAL MANUVERING/INTERVENTIONS: CBT and group interaction activites utilized to stress relapse recovery/prevention. Patient to actively participate in activity, engage verbally with peers and staff, display an appropriate affect, keep conversation appropriate to topic, and display no negative or impulsive behaviors. Member was encouraged to bring family members for educational group on Wednesdays and Thursday. Member was provided with encouragement and unconditional positive regard. Member was encouraged to continue participation with 12-step meetings and maintaining positive daily choices.       BASELINE SCORES 7/19/18  DASES                   48  URICA                   12 PREPARATION  AUDIT                   37  DAST: pending  UPDATED SCORES na  PLAN:  Continue [facility] IOP Phase I.   Aftercare:  [facility] IOP Phase II  Program Assignments:  Personal Journal, attendance of 12-step meetings, drug screens        This document signed by Sanjuana Arthur [facility]

## 2018-08-16 ENCOUNTER — LAB (OUTPATIENT)
Dept: LAB | Facility: HOSPITAL | Age: 23
End: 2018-08-16

## 2018-08-16 ENCOUNTER — DOCUMENTATION (OUTPATIENT)
Dept: PSYCHIATRY | Facility: HOSPITAL | Age: 23
End: 2018-08-16

## 2018-08-16 ENCOUNTER — OFFICE VISIT (OUTPATIENT)
Dept: PSYCHIATRY | Facility: HOSPITAL | Age: 23
End: 2018-08-16

## 2018-08-16 DIAGNOSIS — F17.210 CIGARETTE SMOKER: ICD-10-CM

## 2018-08-16 DIAGNOSIS — Z79.899 MEDICATION MANAGEMENT: ICD-10-CM

## 2018-08-16 DIAGNOSIS — F33.1 MODERATE EPISODE OF RECURRENT MAJOR DEPRESSIVE DISORDER (HCC): ICD-10-CM

## 2018-08-16 DIAGNOSIS — F17.210 CIGARETTE NICOTINE DEPENDENCE WITHOUT COMPLICATION: ICD-10-CM

## 2018-08-16 DIAGNOSIS — F10.20 UNCOMPLICATED ALCOHOL DEPENDENCE (HCC): Primary | ICD-10-CM

## 2018-08-16 DIAGNOSIS — F10.20 ACUTE ALCOHOLISM (HCC): ICD-10-CM

## 2018-08-16 PROCEDURE — 80307 DRUG TEST PRSMV CHEM ANLYZR: CPT

## 2018-08-16 NOTE — PROGRESS NOTES
I have reviewed the notes, assessments, and/or procedures performed by SUDHA Arthur [facility], I concur with her/his documentation of Veronica Moore.

## 2018-08-16 NOTE — PROGRESS NOTES
Continuing Care  Staffing Review Form    8/16/2018     Any inappropriate use of alcohol or drugs since last session?  No    How many 12 - step meeting have you attended since last session? 3-5 per week    Do you have a sponsor?  Yes,  Have you made contact with  him/her this past week?  yes.    Which of the following has been a problem for you this past week:  Cravings, Too much free time/ boredom, Work issues, Stress, Lack of sleep, Anxiety / restlessness and Financial problems    On a scale of 1 -10  (1= none and 10 = near relapse)  where you see yourself on the relapse scale:3    Staff notes Pt is on Vivitrol.  She continues to report mild cravings.  She denies relapse.  She receives [employer] gas cards.  Pt is dealing with work related stress.  She appears to be tolerating them and using coping skills to minimize relapse risk.     Your medication list          Accurate as of 8/16/18  5:11 PM. If you have any questions, ask your nurse or doctor.               CONTINUE taking these medications      Instructions Last Dose Given Next Dose Due   ARIPiprazole 5 MG tablet  Commonly known as:  ABILIFY      TAKE ONE TABLET BY MOUTH EVERY DAY FOR MOOD       escitalopram 20 MG tablet  Commonly known as:  LEXAPRO      TAKE 1   1 2 TABLETS BY MOUTH EVERY DAY FOR MOOD       hydrOXYzine 10 MG tablet  Commonly known as:  ATARAX      Take 10 mg by mouth 3 (Three) Times a Day.       levothyroxine 25 MCG tablet  Commonly known as:  SYNTHROID, LEVOTHROID      TAKE ONE TABLET BY MOUTH EVERY DAY FOR THYROID       levothyroxine 100 MCG tablet  Commonly known as:  SYNTHROID, LEVOTHROID      Take 100 mcg by mouth Daily.       Naltrexone 380 MG reconstituted suspension IM suspension  Commonly known as:  VIVITROL      Inject 380 mg into the appropriate muscle as directed by prescriber Every 28 (Twenty-Eight) Days.       naproxen 500 MG tablet  Commonly known as:  NAPROSYN      TK 1 T PO  BID PRF PAIN       prazosin 1 MG capsule  Commonly  known as:  MINIPRESS      Take 1 capsule by mouth Every Night.       TAYTULLA 1-20 MG-MCG(24) capsule  Generic drug:  Norethin Ace-Eth Estrad-FE      Take 1 capsule by mouth Daily.

## 2018-08-20 ENCOUNTER — OFFICE VISIT (OUTPATIENT)
Dept: PSYCHIATRY | Facility: HOSPITAL | Age: 23
End: 2018-08-20

## 2018-08-20 ENCOUNTER — LAB (OUTPATIENT)
Dept: LAB | Facility: HOSPITAL | Age: 23
End: 2018-08-20

## 2018-08-20 DIAGNOSIS — F10.20 UNCOMPLICATED ALCOHOL DEPENDENCE (HCC): Primary | ICD-10-CM

## 2018-08-20 DIAGNOSIS — Z79.899 MEDICATION MANAGEMENT: ICD-10-CM

## 2018-08-20 DIAGNOSIS — F10.20 ACUTE ALCOHOLISM (HCC): ICD-10-CM

## 2018-08-20 DIAGNOSIS — F17.210 CIGARETTE NICOTINE DEPENDENCE WITHOUT COMPLICATION: ICD-10-CM

## 2018-08-20 DIAGNOSIS — F19.10 SUBSTANCE ABUSE (HCC): ICD-10-CM

## 2018-08-20 DIAGNOSIS — F17.210 CIGARETTE SMOKER: ICD-10-CM

## 2018-08-20 DIAGNOSIS — F33.1 MODERATE EPISODE OF RECURRENT MAJOR DEPRESSIVE DISORDER (HCC): ICD-10-CM

## 2018-08-20 PROCEDURE — 80307 DRUG TEST PRSMV CHEM ANLYZR: CPT

## 2018-08-20 PROCEDURE — 82373 ASSAY C-D TRANSFER MEASURE: CPT

## 2018-08-20 PROCEDURE — 36415 COLL VENOUS BLD VENIPUNCTURE: CPT

## 2018-08-20 NOTE — PROGRESS NOTES
"NAME: Veronica Moore  Date: 08/16/18  Phase I 1:00 -pm - 4:00 pm     IOP GROUP NOTE     DATA:     3 hour IOP group therapy session (Check ins, Up or Down, Coping Skills )     Hour 1:Therapist facilitated discussion of the daily motivation passage from the “Daily Reflections” ([employer] (1991). Therapist continued facilitation of rapport building strategies between group members. Therapist asked that each patient check in with home life and recovery efforts and identify triggers, cravings, and high risk situations that arise between group sessions. Members discussed barriers and benefits of attending 12 step meetings. Therapist provided empathy and support during group meeting.  “This is our road to spiritual growth.  We change every day....  This growth is not the result of wishing but of action and prayer.”  Basic Text, p. 37     Hour 2:  Recreational Therapist, Alonzo Vazquez, provided a group experience centered on therapeutic \"fun\".  Members were encouraged, through music, to find healthy ways of coping with life and relapse triggers.     Hour 3: Therapist facilitated a meeting on recognizing past behaviors that have contributed to damaged relationships and discovering ways to cope with life stressors.    RESPONSE: Veronica feels anxious because her boyfriend's sister is in labor.  She is feeling overwhelmed due to money and confusion about work issues.  She notes being 45 days sober.  She denies cravings and denies high risk situations.     ASSESSMENT:     Lab Review  negative       Mental Status Exam  Hygiene:  good  Dress: alcohol  Attitude: cooperative and agreeable   Motor Activity: appropriate  Speech: regular rate and rhythm   Mood:  calm and conversant  Affect:  Appropriate  Thought Processes:  Goal directed and Linear  Thought Content:  Normal and Mood congurent  Suicidal Thoughts:  denies  Homicidal Thoughts:  denies  Crisis Safety Plan: yes, to come to the emergency room.  Hallucinations: "  denies  Reliability: adequate  Insight: adequate  Judgement: adequate  Impulse Control: adequate     Patient's Support Network Includes: The patient lacks significant family support.     Progress toward goal: Not at goal     FUNCTIONING ASSESSMENT:  Community Living Skills: Within functional limits  Interpersonal Skills:  Within functional limits  Health and Physical  Within functional limits  Psychological State:  Within functional limits     Prognosis: Guarded with Ongoing Treatment     Family issues related to recovery:  Not known     12-Step attendance: yes, 4-6 x's per week     Sponsor:  yes  Have you made contact with  him/her this past week? daily   Identification of environmental and personal barriers to recovery: coping with limited emotions, remorse, guilt, work, family, overall understanding of disease of addiction      Motivation for treatment:  healthy lifestyle, long-term, recovery, and improving overall relationships     Impression/Formulation:    ICD-10-CM ICD-9-CM   1. Uncomplicated alcohol dependence (CMS/HCC) F10.20 303.90   2. Moderate episode of recurrent major depressive disorder (CMS/HCC) F33.1 296.32   3. Cigarette nicotine dependence without complication F17.210 305.1   4. Medication management Z79.899 V58.69       CLINICAL MANUVERING/INTERVENTIONS: CBT and group interaction activites utilized to stress relapse recovery/prevention. Patient to actively participate in activity, engage verbally with peers and staff, display an appropriate affect, keep conversation appropriate to topic, and display no negative or impulsive behaviors. Member was encouraged to bring family members for educational group on Wednesdays and Thursday. Member was provided with encouragement and unconditional positive regard. Member was encouraged to continue participation with 12-step meetings and maintaining positive daily choices.       BASELINE SCORES 7/19/18  DASES                   48  URICA                   12  PREPARATION  AUDIT                   37  DAST: pending  UPDATED SCORES na  PLAN:  Continue [facility] IOP Phase I.   Aftercare:  [facility] IOP Phase II  Program Assignments:  Personal Journal, attendance of 12-step meetings, drug screens        This document signed by Sanjuana Arthur [facility]

## 2018-08-21 ENCOUNTER — OFFICE VISIT (OUTPATIENT)
Dept: PSYCHIATRY | Facility: HOSPITAL | Age: 23
End: 2018-08-21

## 2018-08-21 ENCOUNTER — LAB (OUTPATIENT)
Dept: LAB | Facility: HOSPITAL | Age: 23
End: 2018-08-21

## 2018-08-21 DIAGNOSIS — F10.20 ACUTE ALCOHOLISM (HCC): ICD-10-CM

## 2018-08-21 DIAGNOSIS — F33.1 MODERATE EPISODE OF RECURRENT MAJOR DEPRESSIVE DISORDER (HCC): ICD-10-CM

## 2018-08-21 DIAGNOSIS — Z79.899 LONG TERM USE OF DRUG: ICD-10-CM

## 2018-08-21 DIAGNOSIS — Z79.899 MEDICATION MANAGEMENT: ICD-10-CM

## 2018-08-21 DIAGNOSIS — F19.10 SUBSTANCE ABUSE (HCC): ICD-10-CM

## 2018-08-21 DIAGNOSIS — F10.20 UNCOMPLICATED ALCOHOL DEPENDENCE (HCC): Primary | ICD-10-CM

## 2018-08-21 DIAGNOSIS — F17.210 CIGARETTE NICOTINE DEPENDENCE WITHOUT COMPLICATION: ICD-10-CM

## 2018-08-21 DIAGNOSIS — F17.210 CIGARETTE SMOKER: ICD-10-CM

## 2018-08-21 DIAGNOSIS — F51.5 NIGHTMARES: ICD-10-CM

## 2018-08-21 PROCEDURE — 80307 DRUG TEST PRSMV CHEM ANLYZR: CPT

## 2018-08-21 PROCEDURE — 99214 OFFICE O/P EST MOD 30 MIN: CPT | Performed by: NURSE PRACTITIONER

## 2018-08-21 RX ORDER — NALTREXONE HYDROCHLORIDE 50 MG/1
50 TABLET, FILM COATED ORAL DAILY
Qty: 30 TABLET | Refills: 0 | Status: SHIPPED | OUTPATIENT
Start: 2018-08-21 | End: 2018-11-08 | Stop reason: SDDI

## 2018-08-21 RX ORDER — PRAZOSIN HYDROCHLORIDE 2 MG/1
2-4 CAPSULE ORAL NIGHTLY
Qty: 60 CAPSULE | Refills: 0 | Status: SHIPPED | OUTPATIENT
Start: 2018-08-21 | End: 2018-11-08 | Stop reason: SDUPTHER

## 2018-08-21 NOTE — PROGRESS NOTES
Subjective   Patient ID: Veronica Moore is a 22 y.o. female is here today for follow-up    There were no vitals taken for this visit.    Patient has no known allergies.  CC: IOP f/u med management  History of Present Illness  Pt reports she is doing well. Has been taking oral naltrexone awaiting approval for Vivitrol injection. She had a brief moment thinking she would not need naltrexone as she ran out of her prescription, but was able to rationalize this during group today. 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 Denies any significant anxiety/depression. She is sleeping but has nightmares that interrupt sleep. She currently denies any cravings. Appetite good, tolerating diet. No new medical concerns. She is anxious to get back to work.       The following portions of the patient's history were reviewed and updated as appropriate: allergies, current medications, past family history, past medical history, past social history, past surgical history and problem list.      Review of Systems   Constitutional: Negative for activity change, appetite change and fatigue.   HENT: Negative.    Eyes: Negative for visual disturbance.   Respiratory: Negative.    Cardiovascular: Negative.    Gastrointestinal: Negative for nausea.   Endocrine: Negative.    Genitourinary: Negative.    Musculoskeletal: Negative for arthralgias.   Skin: Negative.    Allergic/Immunologic: Negative.    Neurological: Negative for dizziness, seizures and headaches.   Hematological: Negative.    Psychiatric/Behavioral: Negative for agitation, behavioral problems, confusion, decreased concentration, dysphoric mood, hallucinations, self-injury, sleep disturbance and suicidal ideas. The patient is not nervous/anxious and is not hyperactive.        Objective   Mental Status Exam  Appearance:  clean and casually dressed, appropriate  Attitude toward clinician:  cooperative and agreeable   Speech:    Rate:  regular rate and  rhythm   Volume:  normal  Motor:  no abnormal movements present  Mood:  Good  Affect:  euthymic  Thought Processes:  linear, logical, and goal directed  Thought Content:  normal  Suicidal Thoughts:  absent  Homicidal Thoughts:  absent  Perceptual Disturbance: no perceptual disturbance  Attention and Concentration:  good  Insight and Judgement:  good  Memory:  memory appears to be intact    MEDICATION ISSUES:    Lab Review:   Lab on 08/21/2018   Component Date Value   • ETHANOL URINE SCREEN 08/21/2018 Negative    • Amphetamine Screen, Urine 08/21/2018 Negative    • Barbiturates Screen, Uri* 08/21/2018 Negative    • Benzodiazepine Screen, U* 08/21/2018 Negative    • Cocaine Screen, Urine 08/21/2018 Negative    • Methadone Screen, Urine 08/21/2018 Negative    • Opiate Screen 08/21/2018 Negative    • Phencyclidine (PCP), Uri* 08/21/2018 Negative    • THC, Screen, Urine 08/21/2018 Negative    • 6-ACETYL MORPHINE 08/21/2018 Negative    • Buprenorphine, Screen, U* 08/21/2018 Negative    • Oxycodone Screen, Urine 08/21/2018 Negative    Lab on 08/20/2018   Component Date Value   • ETHANOL URINE SCREEN 08/20/2018 Negative    • Amphetamine Screen, Urine 08/20/2018 Negative    • Barbiturates Screen, Uri* 08/20/2018 Negative    • Benzodiazepine Screen, U* 08/20/2018 Negative    • Cocaine Screen, Urine 08/20/2018 Negative    • Methadone Screen, Urine 08/20/2018 Negative    • Opiate Screen 08/20/2018 Negative    • Phencyclidine (PCP), Uri* 08/20/2018 Negative    • THC, Screen, Urine 08/20/2018 Negative    • 6-ACETYL MORPHINE 08/20/2018 Negative    • Buprenorphine, Screen, U* 08/20/2018 Negative    • Oxycodone Screen, Urine 08/20/2018 Negative    Lab on 08/16/2018   Component Date Value   • ETHANOL URINE SCREEN 08/16/2018 Negative    • Amphetamine Screen, Urine 08/16/2018 Negative    • Barbiturates Screen, Uri* 08/16/2018 Negative    • Benzodiazepine Screen, U* 08/16/2018 Negative    • Cocaine Screen, Urine 08/16/2018 Negative    •  Methadone Screen, Urine 08/16/2018 Negative    • Opiate Screen 08/16/2018 Negative    • Phencyclidine (PCP), Uri* 08/16/2018 Negative    • THC, Screen, Urine 08/16/2018 Negative    • 6-ACETYL MORPHINE 08/16/2018 Negative    • Buprenorphine, Screen, U* 08/16/2018 Negative    • Oxycodone Screen, Urine 08/16/2018 Negative    Lab on 08/14/2018   Component Date Value   • ETHANOL URINE SCREEN 08/14/2018 Negative    • Amphetamine Screen, Urine 08/14/2018 Negative    • Barbiturates Screen, Uri* 08/14/2018 Negative    • Benzodiazepine Screen, U* 08/14/2018 Negative    • Cocaine Screen, Urine 08/14/2018 Negative    • Methadone Screen, Urine 08/14/2018 Negative    • Opiate Screen 08/14/2018 Negative    • Phencyclidine (PCP), Uri* 08/14/2018 Negative    • THC, Screen, Urine 08/14/2018 Negative    • 6-ACETYL MORPHINE 08/14/2018 Negative    • Buprenorphine, Screen, U* 08/14/2018 Negative    • Oxycodone Screen, Urine 08/14/2018 Negative    Lab on 08/13/2018   Component Date Value   • ETHANOL URINE SCREEN 08/13/2018 Negative    • Amphetamine Screen, Urine 08/13/2018 Negative    • Barbiturates Screen, Uri* 08/13/2018 Negative    • Benzodiazepine Screen, U* 08/13/2018 Negative    • Cocaine Screen, Urine 08/13/2018 Negative    • Methadone Screen, Urine 08/13/2018 Negative    • Opiate Screen 08/13/2018 Negative    • Phencyclidine (PCP), Uri* 08/13/2018 Negative    • THC, Screen, Urine 08/13/2018 Negative    • 6-ACETYL MORPHINE 08/13/2018 Negative    • Buprenorphine, Screen, U* 08/13/2018 Negative    • Oxycodone Screen, Urine 08/13/2018 Negative    Lab on 08/09/2018   Component Date Value   • ETHANOL URINE SCREEN 08/09/2018 Negative    • Amphetamine Screen, Urine 08/09/2018 Negative    • Barbiturates Screen, Uri* 08/09/2018 Negative    • Benzodiazepine Screen, U* 08/09/2018 Negative    • Cocaine Screen, Urine 08/09/2018 Negative    • Methadone Screen, Urine 08/09/2018 Negative    • Opiate Screen 08/09/2018 Negative    • Phencyclidine  (PCP), Uri* 08/09/2018 Negative    • THC, Screen, Urine 08/09/2018 Negative    • 6-ACETYL MORPHINE 08/09/2018 Negative    • Buprenorphine, Screen, U* 08/09/2018 Negative    • Oxycodone Screen, Urine 08/09/2018 Negative    Lab on 08/07/2018   Component Date Value   • ETHANOL URINE SCREEN 08/07/2018 Negative    • Amphetamine Screen, Urine 08/07/2018 Negative    • Barbiturates Screen, Uri* 08/07/2018 Negative    • Benzodiazepine Screen, U* 08/07/2018 Negative    • Cocaine Screen, Urine 08/07/2018 Negative    • Methadone Screen, Urine 08/07/2018 Negative    • Opiate Screen 08/07/2018 Negative    • Phencyclidine (PCP), Uri* 08/07/2018 Negative    • THC, Screen, Urine 08/07/2018 Negative    • 6-ACETYL MORPHINE 08/07/2018 Negative    • Buprenorphine, Screen, U* 08/07/2018 Negative    • Oxycodone Screen, Urine 08/07/2018 Negative    Lab on 08/06/2018   Component Date Value   • ETHANOL URINE SCREEN 08/06/2018 Negative    • Amphetamine Screen, Urine 08/06/2018 Negative    • Barbiturates Screen, Uri* 08/06/2018 Negative    • Benzodiazepine Screen, U* 08/06/2018 Negative    • Cocaine Screen, Urine 08/06/2018 Negative    • Methadone Screen, Urine 08/06/2018 Negative    • Opiate Screen 08/06/2018 Negative    • Phencyclidine (PCP), Uri* 08/06/2018 Negative    • THC, Screen, Urine 08/06/2018 Negative    • 6-ACETYL MORPHINE 08/06/2018 Negative    • Buprenorphine, Screen, U* 08/06/2018 Negative    • Oxycodone Screen, Urine 08/06/2018 Negative    Lab on 08/02/2018   Component Date Value   • ETHANOL URINE SCREEN 08/02/2018 Negative    • Amphetamine Screen, Urine 08/02/2018 Negative    • Barbiturates Screen, Uri* 08/02/2018 Negative    • Benzodiazepine Screen, U* 08/02/2018 Negative    • Cocaine Screen, Urine 08/02/2018 Negative    • Methadone Screen, Urine 08/02/2018 Negative    • Opiate Screen 08/02/2018 Negative    • Phencyclidine (PCP), Uri* 08/02/2018 Negative    • THC, Screen, Urine 08/02/2018 Negative    • 6-ACETYL MORPHINE  08/02/2018 Negative    • Buprenorphine, Screen, U* 08/02/2018 Negative    • Oxycodone Screen, Urine 08/02/2018 Negative    Lab on 07/31/2018   Component Date Value   • ETHANOL URINE SCREEN 07/31/2018 Negative    • Amphetamine Screen, Urine 07/31/2018 Negative    • Barbiturates Screen, Uri* 07/31/2018 Negative    • Benzodiazepine Screen, U* 07/31/2018 Negative    • Cocaine Screen, Urine 07/31/2018 Negative    • Methadone Screen, Urine 07/31/2018 Negative    • Opiate Screen 07/31/2018 Negative    • Phencyclidine (PCP), Uri* 07/31/2018 Negative    • THC, Screen, Urine 07/31/2018 Negative    • 6-ACETYL MORPHINE 07/31/2018 Negative    • Buprenorphine, Screen, U* 07/31/2018 Negative    • Oxycodone Screen, Urine 07/31/2018 Negative    • TSH 07/31/2018 3.230    • Free T4 07/31/2018 1.01    • Vitamin B-12 07/31/2018 495    • 1,25-Dihydroxy, Vitamin D 07/31/2018 44.9    • Glucose 07/31/2018 77    • BUN 07/31/2018 8    • Creatinine 07/31/2018 0.74    • Sodium 07/31/2018 136    • Potassium 07/31/2018 3.7    • Chloride 07/31/2018 107    • CO2 07/31/2018 24.3    • Calcium 07/31/2018 9.4    • eGFR Non African Amer 07/31/2018 99    • BUN/Creatinine Ratio 07/31/2018 10.8    • Anion Gap 07/31/2018 4.7    • Total Protein 07/31/2018 7.8    • Albumin 07/31/2018 4.50    • ALT (SGPT) 07/31/2018 12    • AST (SGOT) 07/31/2018 22    • Alkaline Phosphatase 07/31/2018 55    • Total Bilirubin 07/31/2018 0.2    • Bilirubin, Direct 07/31/2018 0.0    • Bilirubin, Indirect 07/31/2018 0.2    • WBC 07/31/2018 10.90    • RBC 07/31/2018 4.26    • Hemoglobin 07/31/2018 13.1    • Hematocrit 07/31/2018 39.8    • MCV 07/31/2018 93.4    • MCH 07/31/2018 30.8    • MCHC 07/31/2018 32.9*   • RDW 07/31/2018 12.9    • RDW-SD 07/31/2018 42.7    • MPV 07/31/2018 9.0    • Platelets 07/31/2018 329    • Neutrophil % 07/31/2018 60.8    • Lymphocyte % 07/31/2018 28.7    • Monocyte % 07/31/2018 8.5    • Eosinophil % 07/31/2018 1.4    • Basophil % 07/31/2018 0.4    •  Immature Grans % 07/31/2018 0.2    • Neutrophils, Absolute 07/31/2018 6.63*   • Lymphocytes, Absolute 07/31/2018 3.13*   • Monocytes, Absolute 07/31/2018 0.93*   • Eosinophils, Absolute 07/31/2018 0.15    • Basophils, Absolute 07/31/2018 0.04    • Immature Grans, Absolute 07/31/2018 0.02    • Osmolality Calc 07/31/2018 269.1*   There may be more visits with results that are not included.     Assessment/Plan   Diagnoses and all orders for this visit:    Uncomplicated alcohol dependence (CMS/HCC)  -     naltrexone (DEPADE) 50 MG tablet; Take 1 tablet by mouth Daily.    Moderate episode of recurrent major depressive disorder (CMS/HCC)    Cigarette nicotine dependence without complication    Substance abuse  -     naltrexone (DEPADE) 50 MG tablet; Take 1 tablet by mouth Daily.    Medication management    Long term use of drug    Nightmares  -     prazosin (MINIPRESS) 2 MG capsule; Take 1-2 capsules by mouth Every Night.    Functionality: pt showing improvements in important areas of daily functioning.  patient was educated to eat healthier diet choices and encouraged exercise.  I advised Veronica of the risks of continuing to use tobacco, and I provided her with tobacco cessation educational materials in the After Visit Summary.     During this visit, I spent 5 minutes counseling the patient regarding tobacco cessation.  We will add Prazosin for nightmares. I will check as to why she has not received Vivitrol injection and for the time being continue her on naltrexone oral. Continue abilify, lexapro, hydroxyzine for mood/anxiety/depressive symptoms.  UDS and labs reviewed, unremarkable.   Return in about 2 weeks (around 9/4/2018), or if symptoms worsen or fail to improve, for Recheck, Next scheduled follow up.           Encounter Diagnoses   Name Primary?   • Uncomplicated alcohol dependence (CMS/HCC) Yes   • Moderate episode of recurrent major depressive disorder (CMS/HCC)    • Cigarette nicotine dependence without  complication    • Substance abuse    • Medication management    • Long term use of drug    • Nightmares        Current Outpatient Prescriptions   Medication Sig Dispense Refill   • ARIPiprazole (ABILIFY) 5 MG tablet TAKE ONE TABLET BY MOUTH EVERY DAY FOR MOOD  1   • escitalopram (LEXAPRO) 20 MG tablet TAKE 1   1 2 TABLETS BY MOUTH EVERY DAY FOR MOOD  2   • hydrOXYzine (ATARAX) 10 MG tablet Take 10 mg by mouth 3 (Three) Times a Day.  1   • levothyroxine (SYNTHROID, LEVOTHROID) 100 MCG tablet Take 100 mcg by mouth Daily.     • levothyroxine (SYNTHROID, LEVOTHROID) 25 MCG tablet TAKE ONE TABLET BY MOUTH EVERY DAY FOR THYROID  5   • naltrexone (DEPADE) 50 MG tablet Take 1 tablet by mouth Daily. 30 tablet 0   • Naltrexone (VIVITROL) 380 MG reconstituted suspension IM suspension Inject 380 mg into the appropriate muscle as directed by prescriber Every 28 (Twenty-Eight) Days. 1 each 1   • naproxen (NAPROSYN) 500 MG tablet TK 1 T PO  BID PRF PAIN  0   • prazosin (MINIPRESS) 2 MG capsule Take 1-2 capsules by mouth Every Night. 60 capsule 0   • TAYTULLA 1-20 MG-MCG(24) capsule Take 1 capsule by mouth Daily.  4     Current Facility-Administered Medications   Medication Dose Route Frequency Provider Last Rate Last Dose   • Naltrexone (VIVITROL) IM suspension 380 mg  380 mg Intramuscular Q28 Days Carol Ramirez, APRN               Lab Results   Component Value Date    WBC 10.90 07/31/2018    HGB 13.1 07/31/2018    HCT 39.8 07/31/2018    MCV 93.4 07/31/2018     07/31/2018     Lab Results   Component Value Date    GLUCOSE 77 07/31/2018    BUN 8 07/31/2018    CREATININE 0.74 07/31/2018    EGFRIFNONA 99 07/31/2018    BCR 10.8 07/31/2018    CO2 24.3 07/31/2018    CALCIUM 9.4 07/31/2018    ALBUMIN 4.50 07/31/2018    LABIL2 1.3 (L) 09/27/2014    AST 22 07/31/2018    ALT 12 07/31/2018     Pain Management Panel     Pain Management Panel Latest Ref Rng & Units 8/21/2018 8/20/2018    AMPHETAMINES SCREEN, URINE Negative Negative  Negative    BARBITURATES SCREEN Negative Negative Negative    BENZODIAZEPINE SCREEN, URINE Negative Negative Negative    BUPRENORPHINE Negative Negative Negative    COCAINE SCREEN, URINE Negative Negative Negative    METHADONE SCREEN, URINE Negative Negative Negative          Lab Results   Component Value Date     07/31/2018    BUN 8 07/31/2018    CREATININE 0.74 07/31/2018    TSH 3.230 07/31/2018    WBC 10.90 07/31/2018

## 2018-08-23 LAB
CDT/TF MFR SERPL: 0.9 % (ref 0–1.3)
LABORATORY COMMENT REPORT: NORMAL

## 2018-08-23 NOTE — PROGRESS NOTES
"NAME: Veronica Moore  Date: 08/20/18  Phase I 1:00 pm - 4:00 pm     IOP GROUP NOTE     DATA:     3 hour IOP group therapy session (Check ins, Friendships, Coping Skills )     Hour 1:Therapist facilitated discussion of the daily motivation passage from the “Daily Reflections” ([employer] (1991). Therapist continued facilitation of rapport building strategies between group members. Therapist asked that each patient check in with home life and recovery efforts and identify triggers, cravings, and high risk situations that arise between group sessions. Members discussed barriers and benefits of attending 12 step meetings. Therapist provided empathy and support during group meeting.  “Our friendships become deep, and we experience the warmth and caring which results from addicts sharing recovery and a new life.”  IP No. 19, Self-Acceptance     Hour 2/3:  Therapist facilitated a group discussion on truthfulness/honesty.     RESPONSE: Veronica reports she is still experiencing \"using nightmares\".  She shares they are getting some better but are problematic.  She reports ongoing mild depression but appears to be coping with it.  She denies relapse.     ASSESSMENT:     Lab Review  negative       Mental Status Exam  Hygiene:  good  Dress: appropriate  Attitude: cooperative and agreeable   Motor Activity: appropriate  Speech: regular rate and rhythm   Mood:  calm and conversant  Affect:  Appropriate  Thought Processes:  Goal directed and Linear  Thought Content:  Normal and Mood congurent  Suicidal Thoughts:  denies  Homicidal Thoughts:  denies  Crisis Safety Plan: yes, to come to the emergency room.  Hallucinations:  denies  Reliability: adequate  Insight: adequate  Judgement: adequate  Impulse Control: adequate     Patient's Support Network Includes: The patient lacks significant family support.     Progress toward goal: Not at goal     FUNCTIONING ASSESSMENT:  Community Living Skills: Within functional limits  Interpersonal " Skills:  Within functional limits  Health and Physical  Within functional limits  Psychological State:  Within functional limits     Prognosis: Guarded with Ongoing Treatment     Family issues related to recovery:  Not known     12-Step attendance: yes, 4-6 x's per week     Sponsor:  yes  Have you made contact with  him/her this past week? daily   Identification of environmental and personal barriers to recovery: coping with limited emotions, remorse, guilt, work, family, overall understanding of disease of addiction      Motivation for treatment:  healthy lifestyle, long-term, recovery, and improving overall relationships     Impression/Formulation:    ICD-10-CM ICD-9-CM   1. Uncomplicated alcohol dependence (CMS/HCC) F10.20 303.90   2. Moderate episode of recurrent major depressive disorder (CMS/HCC) F33.1 296.32   3. Cigarette nicotine dependence without complication F17.210 305.1   4. Medication management Z79.899 V58.69       CLINICAL MANUVERING/INTERVENTIONS: CBT and group interaction activites utilized to stress relapse recovery/prevention. Patient to actively participate in activity, engage verbally with peers and staff, display an appropriate affect, keep conversation appropriate to topic, and display no negative or impulsive behaviors. Member was encouraged to bring family members for educational group on Wednesdays and Thursday. Member was provided with encouragement and unconditional positive regard. Member was encouraged to continue participation with 12-step meetings and maintaining positive daily choices.       BASELINE SCORES 7/19/18  DASES                   48  URICA                   12 PREPARATION  AUDIT                   37  DAST: pending  UPDATED SCORES na    PLAN:  Continue [facility] IOP Phase I.   Aftercare:  [facility] IOP Phase II  Program Assignments:  Personal Journal, attendance of 12-step meetings, drug screens        This document  signed by Sanjuana Arthur [facility]

## 2018-08-27 ENCOUNTER — OFFICE VISIT (OUTPATIENT)
Dept: PSYCHIATRY | Facility: HOSPITAL | Age: 23
End: 2018-08-27

## 2018-08-27 ENCOUNTER — LAB (OUTPATIENT)
Dept: LAB | Facility: HOSPITAL | Age: 23
End: 2018-08-27

## 2018-08-27 DIAGNOSIS — F10.20 ACUTE ALCOHOLISM (HCC): Primary | ICD-10-CM

## 2018-08-27 DIAGNOSIS — F10.20 ACUTE ALCOHOLISM (HCC): ICD-10-CM

## 2018-08-27 DIAGNOSIS — Z79.899 LONG TERM USE OF DRUG: ICD-10-CM

## 2018-08-27 DIAGNOSIS — F17.210 CIGARETTE SMOKER: ICD-10-CM

## 2018-08-27 DIAGNOSIS — F17.210 CIGARETTE NICOTINE DEPENDENCE, UNCOMPLICATED: ICD-10-CM

## 2018-08-27 DIAGNOSIS — Z79.899 MEDICATION MANAGEMENT: ICD-10-CM

## 2018-08-27 DIAGNOSIS — F33.1 MODERATE EPISODE OF RECURRENT MAJOR DEPRESSIVE DISORDER (HCC): ICD-10-CM

## 2018-08-27 DIAGNOSIS — F17.210 CIGARETTE NICOTINE DEPENDENCE WITHOUT COMPLICATION: ICD-10-CM

## 2018-08-27 DIAGNOSIS — F10.20 UNCOMPLICATED ALCOHOL DEPENDENCE (HCC): Primary | ICD-10-CM

## 2018-08-27 PROCEDURE — 80307 DRUG TEST PRSMV CHEM ANLYZR: CPT

## 2018-08-27 PROCEDURE — G0480 DRUG TEST DEF 1-7 CLASSES: HCPCS

## 2018-08-27 NOTE — PROGRESS NOTES
NAME: Veronica Moore  Date: 08/21/18  Phase I 1:00 pm - 4:00 pm    IIOP GROUP NOTE     DATA:     3 hour IOP group therapy session (Check ins, Friendships, Coping Skills )     Hour 1:Therapist facilitated discussion of the daily motivation passage from the “Daily Reflections” ([employer] (1991). Therapist continued facilitation of rapport building strategies between group members. Therapist asked that each patient check in with home life and recovery efforts and identify triggers, cravings, and high risk situations that arise between group sessions. Members discussed barriers and benefits of attending 12 step meetings. Therapist provided empathy and support during group meeting.  “Our friendships become deep, and we experience the warmth and caring which results from addicts sharing recovery and a new life.”  IP No. 19, Self-Acceptance     Hour 2/3:  Therapist facilitated a group discussion on truthfulness/honesty.    RESPONSE: Veronica shares she has been 50 days sober.  She thinks sobriety is getting easier.  She notes the most difficult thing is learning how to deal with her problems     ASSESSMENT:     Lab Review  negative       Mental Status Exam  Hygiene:  good  Dress: appropriate  Attitude: cooperative and agreeable   Motor Activity: appropriate  Speech: regular rate and rhythm   Mood:  calm and conversant  Affect:  Appropriate  Thought Processes:  Goal directed and Linear  Thought Content:  Normal and Mood congurent  Suicidal Thoughts:  denies  Homicidal Thoughts:  denies  Crisis Safety Plan: yes, to come to the emergency room.  Hallucinations:  denies  Reliability: adequate  Insight: adequate  Judgement: adequate  Impulse Control: adequate     Patient's Support Network Includes: The patient lacks significant family support.     Progress toward goal: Not at goal     FUNCTIONING ASSESSMENT:  Community Living Skills: Within functional limits  Interpersonal Skills:  Within functional limits  Health and Physical   Within functional limits  Psychological State:  Within functional limits     Prognosis: Guarded with Ongoing Treatment     Family issues related to recovery:  Not known     12-Step attendance: yes, 4-6 x's per week     Sponsor:  yes  Have you made contact with  him/her this past week? daily   Identification of environmental and personal barriers to recovery: coping with limited emotions, remorse, guilt, work, family, overall understanding of disease of addiction      Motivation for treatment:  healthy lifestyle, long-term, recovery, and improving overall relationships     Impression/Formulation:     ICD-10-CM ICD-9-CM   1. Uncomplicated alcohol dependence (CMS/HCC) F10.20 303.90   2. Moderate episode of recurrent major depressive disorder (CMS/HCC) F33.1 296.32   3. Cigarette nicotine dependence without complication F17.210 305.1   4. Substance abuse F19.10 305.90   5. Medication management Z79.899 V58.69   6. Long term use of drug Z79.899 V58.69   7. Nightmares F51.5 307.47      CLINICAL MANUVERING/INTERVENTIONS: CBT and group interaction activites utilized to stress relapse recovery/prevention. Patient to actively participate in activity, engage verbally with peers and staff, display an appropriate affect, keep conversation appropriate to topic, and display no negative or impulsive behaviors. Member was encouraged to bring family members for educational group on Wednesdays and Thursday. Member was provided with encouragement and unconditional positive regard. Member was encouraged to continue participation with 12-step meetings and maintaining positive daily choices.       BASELINE SCORES 7/19/18  DASES                   48  URICA                   12 PREPARATION  AUDIT                   37  DAST: pending  UPDATED SCORES na       PLAN:  Continue [facility] IOP Phase I.   Aftercare:  [facility] IOP Phase II  Program Assignments:  Personal Journal, attendance of  12-step meetings, drug screens        This document signed by Sanjuana Arthur [facility]

## 2018-08-28 ENCOUNTER — OFFICE VISIT (OUTPATIENT)
Dept: PSYCHIATRY | Facility: HOSPITAL | Age: 23
End: 2018-08-28

## 2018-08-28 ENCOUNTER — LAB (OUTPATIENT)
Dept: LAB | Facility: HOSPITAL | Age: 23
End: 2018-08-28

## 2018-08-28 DIAGNOSIS — F17.210 CIGARETTE SMOKER: ICD-10-CM

## 2018-08-28 DIAGNOSIS — Z79.899 MEDICATION MANAGEMENT: ICD-10-CM

## 2018-08-28 DIAGNOSIS — F10.20 ACUTE ALCOHOLISM (HCC): ICD-10-CM

## 2018-08-28 DIAGNOSIS — F33.1 MAJOR DEPRESSIVE DISORDER, RECURRENT EPISODE, MODERATE (HCC): ICD-10-CM

## 2018-08-28 DIAGNOSIS — Z79.899 LONG TERM USE OF DRUG: ICD-10-CM

## 2018-08-28 DIAGNOSIS — F17.210 CIGARETTE NICOTINE DEPENDENCE WITHOUT COMPLICATION: ICD-10-CM

## 2018-08-28 DIAGNOSIS — F10.20 UNCOMPLICATED ALCOHOL DEPENDENCE (HCC): Primary | ICD-10-CM

## 2018-08-28 DIAGNOSIS — F33.1 MODERATE EPISODE OF RECURRENT MAJOR DEPRESSIVE DISORDER (HCC): ICD-10-CM

## 2018-08-28 DIAGNOSIS — F10.20 ACUTE ALCOHOLISM (HCC): Primary | ICD-10-CM

## 2018-08-28 PROCEDURE — 80307 DRUG TEST PRSMV CHEM ANLYZR: CPT

## 2018-08-28 PROCEDURE — 36415 COLL VENOUS BLD VENIPUNCTURE: CPT

## 2018-08-28 PROCEDURE — 82373 ASSAY C-D TRANSFER MEASURE: CPT | Performed by: NURSE PRACTITIONER

## 2018-08-28 PROCEDURE — G0480 DRUG TEST DEF 1-7 CLASSES: HCPCS

## 2018-08-29 ENCOUNTER — DOCUMENTATION (OUTPATIENT)
Dept: PSYCHIATRY | Facility: HOSPITAL | Age: 23
End: 2018-08-29

## 2018-08-29 ENCOUNTER — LAB (OUTPATIENT)
Dept: LAB | Facility: HOSPITAL | Age: 23
End: 2018-08-29

## 2018-08-29 DIAGNOSIS — F17.210 CIGARETTE SMOKER: ICD-10-CM

## 2018-08-29 DIAGNOSIS — F10.20 ACUTE ALCOHOLISM (HCC): ICD-10-CM

## 2018-08-29 DIAGNOSIS — F33.1 MAJOR DEPRESSIVE DISORDER, RECURRENT EPISODE, MODERATE (HCC): ICD-10-CM

## 2018-08-29 DIAGNOSIS — Z79.899 LONG TERM USE OF DRUG: ICD-10-CM

## 2018-08-29 DIAGNOSIS — Z79.899 MEDICATION MANAGEMENT: ICD-10-CM

## 2018-08-29 PROCEDURE — G0480 DRUG TEST DEF 1-7 CLASSES: HCPCS

## 2018-08-29 PROCEDURE — 80307 DRUG TEST PRSMV CHEM ANLYZR: CPT

## 2018-08-29 NOTE — PROGRESS NOTES
[place] [id number]  [phone]      NAME: Veronica Moore  MRN: [id number]  [facility]: [id number]  ATTENDING PHYSICIAN:Lisa Buchanan M.D.  THERAPIST: Camille Arthur [facility], [facility]  PRIMARY CARE PROVIDER: Nathaly Head APRN  DATE OF ADMISSION: 07/19/2018  DATE OF DISCHARGE: 08/28/2018  YOB: 1995  REASON FOR ADMISSION: The patient was a voluntary admit to the [facility] IOP program for assistance with ALCOHOL dependencies. The patient was referred to the [facility] program by the HIS/HER Primary Care Provider [facility] MAMADOU.  SUMMARY OF CARE, TREATMENT, AND SERVICES PROVIDED: Patient began Phase 1 of the IOP program on 07/196/2018 and attended 17 group therapy session. Veronica was absent on 1 occasions prior to discharge. Patient's DASES scores increased from 48 at admission to 101 at discharge, which indicated (an) increase in confidence in her ability to maintain abstinence. Patient's URICA scores remained the same from 12 at admission to 13 at discharge. Veronica presented no change  to the PREPARATION stage of change from the PREPARATION stage of change. Veronica screened negative  for ALL ILLICIT DRUGS AND/OR ALCOHOL while in the program.  Veronica completed the following treatment goals: she identified high-risk people, places, and situations to avoid in order to prevent relapse; she attended group therapy sessions as scheduled and participated by giving and receiving feedback; she developed a positive network of support by attending a minimum of three 12-step meetings weekly and obtained a sponsor; she was able to show an increase in her DASES score; she was able to show an increase in her URICA stage; and she was completed personal recovery journal and shared it with the group. Patient identified the following recovery priorities: healthy relationships, more financial stability, and spending more time with sober family members and friends. Veronica  identified healthy coping strategies, such as spending time with family, exercising, focusing on mental and physical health, setting educational goals, and talking to sponsor or sober peers to manage difficult emotions without using substances.     INTENSIVE OUTPATIENT PROGRAM  DISCHARGE SUMMARY  NAME: Veronica Moore  MRN: [id number]  CSN: [id number]  Page 1 of 2     [facility]  INTENSIVE OUTPATIENT PROGRAM  DISCHARGE SUMMARY  DISCHARGE RECOMMENDATIONS AND REFERRALS: Patient is referred to phase II of the [facility] IOP program for outpatient group therapy 2 hours per week, psychiatric medication management, individual therapy as needed, and ongoing drug screening. She has appointment on  August 28 , 2018 at 1:00  pm withROCKY Cummings.  Patient will continue psychiatric mediation management with JOHNATHON Choi in [facility]. Patient was encouraged to continue 12-step meetings and working with his sponsor.  DISCHARGE MEDICATIONS:   Current Outpatient Prescriptions   Medication Sig Dispense Refill   • ARIPiprazole (ABILIFY) 5 MG tablet TAKE ONE TABLET BY MOUTH EVERY DAY FOR MOOD  1   • escitalopram (LEXAPRO) 20 MG tablet TAKE 1   1 2 TABLETS BY MOUTH EVERY DAY FOR MOOD  2   • hydrOXYzine (ATARAX) 10 MG tablet Take 10 mg by mouth 3 (Three) Times a Day.  1   • levothyroxine (SYNTHROID, LEVOTHROID) 100 MCG tablet Take 100 mcg by mouth Daily.     • levothyroxine (SYNTHROID, LEVOTHROID) 25 MCG tablet TAKE ONE TABLET BY MOUTH EVERY DAY FOR THYROID  5   • naltrexone (DEPADE) 50 MG tablet Take 1 tablet by mouth Daily. 30 tablet 0   • Naltrexone (VIVITROL) 380 MG reconstituted suspension IM suspension Inject 380 mg into the appropriate muscle as directed by prescriber Every 28 (Twenty-Eight) Days. 1 each 1   • naproxen (NAPROSYN) 500 MG tablet TK 1 T PO  BID PRF PAIN  0   • prazosin (MINIPRESS) 2 MG capsule Take 1-2 capsules by mouth Every Night. 60 capsule 0   • TAYTULLA 1-20 MG-MCG(24) capsule  Take 1 capsule by mouth Daily.  4     Current Facility-Administered Medications   Medication Dose Route Frequency Provider Last Rate Last Dose   • Naltrexone (VIVITROL) IM suspension 380 mg  380 mg Intramuscular Q28 Days Carol Ramirez APRN         PROGNOSIS: good with ongoing treatment  DISCHARGE DIAGNOSES:   1. Uncomplicated alcohol dependence (CMS/HCC) F10.20 303.90   2. Moderate episode of recurrent major depressive disorder (CMS/HCC) F33.1 296.32   3. Cigarette nicotine dependence without complication F17.210 305.1   4. Medication management Z79.899 V58.69     CC: Nathaly Head APRN        Physician Signature: ________________________________________  Therapist Signature: ________________________________________  Behavioral Health Director: __________________________________      INTENSIVE OUTPATIENT PROGRAM  DISCHARGE SUMMARY      NAME: Veronica Moore  MRN: [id number]  CSN: [id number]  Page 2 of 2

## 2018-08-29 NOTE — PROGRESS NOTES
08/29/18        Patient ID: Veronica Moore is in the IOP at [facility].  Patient is released from work restrictions effective 08/28/18.    You can contact me at [phone] ext. [phone] if you have any questions.      Sincerely,        ROCKY Kramer-S, [facility]  IOP Treatment Coordinator

## 2018-08-29 NOTE — PROGRESS NOTES
NAME: Veronica Moore  Date: 08/27/18  Phase I 1:00 pm - 4:00 pm    IOP GROUP NOTE    DATA:     3 hour IOP group therapy session (Check ins, Coping Skills )     Check Ins: Therapist facilitated discussion of the daily motivation passage from the “Daily Reflections” ([employer] (1991). Therapist continued facilitation of rapport building strategies between group members. Therapist asked that each patient check in with home life and recovery efforts and identify triggers, cravings, and high risk situations that arise between group sessions. Members discussed barriers and benefits of attending 12 step meetings. Therapist provided empathy and support during group meeting.      Coping Skills: Therapist facilitated a group activity which focused on long term sobriety.  Members viewed the movie Bryce MOLINA.    RESPONSE: Patient's last IOP day.  She shared her relapse prevention plan.  She denies relapse.       ASSESSMENT:     Lab Review  negative       Mental Status Exam  Hygiene:  good  Dress: appropriate  Attitude: cooperative and agreeable   Motor Activity: appropriate  Speech: regular rate and rhythm   Mood:  calm and conversant  Affect:  Appropriate  Thought Processes:  Goal directed and Linear  Thought Content:  Normal and Mood congurent  Suicidal Thoughts:  denies  Homicidal Thoughts:  denies  Crisis Safety Plan: yes, to come to the emergency room.  Hallucinations:  denies  Reliability: adequate  Insight: adequate  Judgement: adequate  Impulse Control: adequate     Patient's Support Network Includes: The patient lacks significant family support.     Progress toward goal: Not at goal     FUNCTIONING ASSESSMENT:  Community Living Skills: Within functional limits  Interpersonal Skills:  Within functional limits  Health and Physical  Within functional limits  Psychological State:  Within functional limits     Prognosis: Guarded with Ongoing Treatment     Family issues related to recovery:  Not known     12-Step  attendance: yes, 4-6 x's per week     Sponsor:  yes  Have you made contact with  him/her this past week? daily   Identification of environmental and personal barriers to recovery: coping with limited emotions, remorse, guilt, work, family, overall understanding of disease of addiction      Motivation for treatment:  healthy lifestyle, long-term, recovery, and improving overall relationships     Impression/Formulation:     ICD-10-CM ICD-9-CM   1. Uncomplicated alcohol dependence (CMS/HCC) F10.20 303.90   2. Moderate episode of recurrent major depressive disorder (CMS/HCC) F33.1 296.32   3. Cigarette nicotine dependence without complication F17.210 305.1   4. Medication management Z79.899 V58.69      CLINICAL MANUVERING/INTERVENTIONS: CBT and group interaction activites utilized to stress relapse recovery/prevention. Patient to actively participate in activity, engage verbally with peers and staff, display an appropriate affect, keep conversation appropriate to topic, and display no negative or impulsive behaviors. Member was encouraged to bring family members for educational group on Wednesdays and Thursday. Member was provided with encouragement and unconditional positive regard. Member was encouraged to continue participation with 12-step meetings and maintaining positive daily choices.       BASELINE SCORES 7/19/18  DASES                   48  URICA                   12 PREPARATION  AUDIT                   37  DAST: pending  UPDATED SCORES na    PLAN:  [facility] IOP Phase II.      This document signed by Sanjuana Arthur [facility]

## 2018-08-30 ENCOUNTER — APPOINTMENT (OUTPATIENT)
Dept: PSYCHIATRY | Facility: HOSPITAL | Age: 23
End: 2018-08-30

## 2018-08-30 DIAGNOSIS — F10.20 ACUTE ALCOHOLISM (HCC): Primary | ICD-10-CM

## 2018-08-30 DIAGNOSIS — F17.210 CIGARETTE SMOKER: ICD-10-CM

## 2018-08-30 LAB
CDT/TF MFR SERPL: 1 % (ref 0–1.3)
GABAPENTIN UR-MCNC: NEGATIVE UG/ML
LABORATORY COMMENT REPORT: NORMAL

## 2018-08-30 NOTE — PROGRESS NOTES
NAME: Veronica Moroe  Date: 08/28/18  Phase I 8:30 am - 11:30 am    IOP GROUP NOTE    DATA:     3 hour IOP group therapy session (Check ins, Coping Skills )     Check Ins: Therapist facilitated discussion of the daily motivation passage from the “Daily Reflections” ([employer] (1991). Therapist continued facilitation of rapport building strategies between group members. Therapist asked that each patient check in with home life and recovery efforts and identify triggers, cravings, and high risk situations that arise between group sessions. Members discussed barriers and benefits of attending 12 step meetings. Therapist provided empathy and support during group meeting.      Coping Skills: Participants viewed Bill W, The Movie ([employer])    RESPONSE: Patient's last IOP day.  She shared her relapse prevention plan.  She denies relapse.       ASSESSMENT:      Lab Review  negative       Mental Status Exam  Hygiene:  good  Dress: appropriate  Attitude: cooperative and agreeable   Motor Activity: appropriate  Speech: regular rate and rhythm   Mood:  calm and conversant  Affect:  Appropriate  Thought Processes:  Goal directed and Linear  Thought Content:  Normal and Mood congurent  Suicidal Thoughts:  denies  Homicidal Thoughts:  denies  Crisis Safety Plan: yes, to come to the emergency room.  Hallucinations:  denies  Reliability: adequate  Insight: adequate  Judgement: adequate  Impulse Control: adequate     Patient's Support Network Includes: The patient lacks significant family support.     Progress toward goal: Not at goal     FUNCTIONING ASSESSMENT:  Community Living Skills: Within functional limits  Interpersonal Skills:  Within functional limits  Health and Physical  Within functional limits  Psychological State:  Within functional limits     Prognosis: Guarded with Ongoing Treatment     Family issues related to recovery:  Not known     12-Step attendance: yes, 4-6 x's per week     Sponsor:  yes  Have you made contact  with  him/her this past week? daily   Identification of environmental and personal barriers to recovery: coping with limited emotions, remorse, guilt, work, family, overall understanding of disease of addiction      Motivation for treatment:  healthy lifestyle, long-term, recovery, and improving overall relationships     Impression/Formulation:     ICD-10-CM ICD-9-CM   1. Uncomplicated alcohol dependence (CMS/HCC) F10.20 303.90   2. Moderate episode of recurrent major depressive disorder (CMS/HCC) F33.1 296.32   3. Cigarette nicotine dependence without complication F17.210 305.1      CLINICAL MANUVERING/INTERVENTIONS: CBT and group interaction activites utilized to stress relapse recovery/prevention. Patient to actively participate in activity, engage verbally with peers and staff, display an appropriate affect, keep conversation appropriate to topic, and display no negative or impulsive behaviors. Member was encouraged to bring family members for educational group on Wednesdays and Thursday. Member was provided with encouragement and unconditional positive regard. Member was encouraged to continue participation with 12-step meetings and maintaining positive daily choices.       BASELINE SCORES 7/19/18  DASES                   48  URICA                   12 PREPARATION  AUDIT                   37  DAST: pending  UPDATED SCORES na  PLAN:  Continue [facility] IOP Phase I.   Aftercare:  [facility] IOP Phase II  Program Assignments:  Personal Journal, attendance of 12-step meetings, drug screens        This document signed by Sanjuana Arthur [facility]

## 2018-08-31 ENCOUNTER — LAB (OUTPATIENT)
Dept: LAB | Facility: HOSPITAL | Age: 23
End: 2018-08-31

## 2018-08-31 ENCOUNTER — APPOINTMENT (OUTPATIENT)
Dept: PSYCHIATRY | Facility: CLINIC | Age: 23
End: 2018-08-31

## 2018-08-31 DIAGNOSIS — F17.210 CIGARETTE SMOKER: ICD-10-CM

## 2018-08-31 DIAGNOSIS — F33.1 MAJOR DEPRESSIVE DISORDER, RECURRENT EPISODE, MODERATE (HCC): ICD-10-CM

## 2018-08-31 DIAGNOSIS — Z79.899 LONG TERM USE OF DRUG: ICD-10-CM

## 2018-08-31 DIAGNOSIS — F10.20 ACUTE ALCOHOLISM (HCC): ICD-10-CM

## 2018-08-31 DIAGNOSIS — Z79.899 MEDICATION MANAGEMENT: ICD-10-CM

## 2018-08-31 DIAGNOSIS — F10.20 ACUTE ALCOHOLISM (HCC): Primary | ICD-10-CM

## 2018-08-31 DIAGNOSIS — F10.21 ALCOHOL DEPENDENCE IN EARLY, EARLY PARTIAL, SUSTAINED FULL, OR SUSTAINED PARTIAL REMISSION (HCC): Primary | ICD-10-CM

## 2018-08-31 LAB
6-ACETYL MORPHINE: NEGATIVE
AMPHET+METHAMPHET UR QL: NEGATIVE
BARBITURATES UR QL SCN: NEGATIVE
BENZODIAZ UR QL SCN: NEGATIVE
BUPRENORPHINE SERPL-MCNC: NEGATIVE NG/ML
CANNABINOIDS SERPL QL: NEGATIVE
COCAINE UR QL: NEGATIVE
ETHANOL URINE SCREEN: NEGATIVE
GABAPENTIN UR-MCNC: NEGATIVE UG/ML
METHADONE UR QL SCN: NEGATIVE
OPIATES UR QL: NEGATIVE
OXYCODONE UR QL SCN: NEGATIVE
PCP UR QL SCN: NEGATIVE

## 2018-08-31 PROCEDURE — 80307 DRUG TEST PRSMV CHEM ANLYZR: CPT

## 2018-08-31 PROCEDURE — 80307 DRUG TEST PRSMV CHEM ANLYZR: CPT | Performed by: NURSE PRACTITIONER

## 2018-09-01 LAB
MITRAGYNINE+7-OH UR QL CFM: NEGATIVE
MITRAGYNINE+7-OH UR QL CFM: NEGATIVE

## 2018-09-04 ENCOUNTER — APPOINTMENT (OUTPATIENT)
Dept: PSYCHIATRY | Facility: HOSPITAL | Age: 23
End: 2018-09-04

## 2018-09-04 LAB
Lab: NEGATIVE NG/ML
Lab: NORMAL

## 2018-09-05 ENCOUNTER — LAB (OUTPATIENT)
Dept: LAB | Facility: HOSPITAL | Age: 23
End: 2018-09-05

## 2018-09-05 DIAGNOSIS — Z79.899 MEDICATION MANAGEMENT: ICD-10-CM

## 2018-09-05 DIAGNOSIS — F10.20 ACUTE ALCOHOLISM (HCC): ICD-10-CM

## 2018-09-05 DIAGNOSIS — F17.210 CIGARETTE SMOKER: ICD-10-CM

## 2018-09-05 DIAGNOSIS — Z79.899 LONG TERM USE OF DRUG: ICD-10-CM

## 2018-09-05 PROCEDURE — 80307 DRUG TEST PRSMV CHEM ANLYZR: CPT

## 2018-09-06 ENCOUNTER — APPOINTMENT (OUTPATIENT)
Dept: PSYCHIATRY | Facility: HOSPITAL | Age: 23
End: 2018-09-06

## 2018-09-06 LAB — MITRAGYNINE+7-OH UR QL CFM: NEGATIVE

## 2018-09-12 ENCOUNTER — LAB (OUTPATIENT)
Dept: LAB | Facility: HOSPITAL | Age: 23
End: 2018-09-12

## 2018-09-12 DIAGNOSIS — F17.210 CIGARETTE SMOKER: ICD-10-CM

## 2018-09-12 DIAGNOSIS — Z79.899 MEDICATION MANAGEMENT: ICD-10-CM

## 2018-09-12 DIAGNOSIS — F10.20 ACUTE ALCOHOLISM (HCC): ICD-10-CM

## 2018-09-12 DIAGNOSIS — Z79.899 LONG TERM USE OF DRUG: ICD-10-CM

## 2018-09-12 PROCEDURE — 80307 DRUG TEST PRSMV CHEM ANLYZR: CPT

## 2018-09-14 ENCOUNTER — LAB (OUTPATIENT)
Dept: LAB | Facility: HOSPITAL | Age: 23
End: 2018-09-14

## 2018-09-14 DIAGNOSIS — F17.210 CIGARETTE SMOKER: ICD-10-CM

## 2018-09-14 DIAGNOSIS — F10.20 ACUTE ALCOHOLISM (HCC): ICD-10-CM

## 2018-09-14 DIAGNOSIS — Z79.899 LONG TERM USE OF DRUG: ICD-10-CM

## 2018-09-14 DIAGNOSIS — Z79.899 MEDICATION MANAGEMENT: ICD-10-CM

## 2018-09-14 PROCEDURE — 80307 DRUG TEST PRSMV CHEM ANLYZR: CPT

## 2018-09-18 ENCOUNTER — OFFICE VISIT (OUTPATIENT)
Dept: PSYCHIATRY | Facility: CLINIC | Age: 23
End: 2018-09-18

## 2018-09-18 ENCOUNTER — APPOINTMENT (OUTPATIENT)
Dept: LAB | Facility: HOSPITAL | Age: 23
End: 2018-09-18

## 2018-09-18 DIAGNOSIS — F10.21 ALCOHOL DEPENDENCE IN EARLY, EARLY PARTIAL, SUSTAINED FULL, OR SUSTAINED PARTIAL REMISSION (HCC): Primary | ICD-10-CM

## 2018-09-18 DIAGNOSIS — F43.10 POST TRAUMATIC STRESS DISORDER (PTSD): ICD-10-CM

## 2018-09-18 PROCEDURE — 90834 PSYTX W PT 45 MINUTES: CPT | Performed by: SOCIAL WORKER
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
## 2018-09-19 ENCOUNTER — LAB (OUTPATIENT)
Dept: LAB | Facility: HOSPITAL | Age: 23
End: 2018-09-19

## 2018-09-19 DIAGNOSIS — Z79.899 MEDICATION MANAGEMENT: ICD-10-CM

## 2018-09-19 DIAGNOSIS — F17.210 CIGARETTE SMOKER: ICD-10-CM

## 2018-09-19 DIAGNOSIS — F10.20 ACUTE ALCOHOLISM (HCC): ICD-10-CM

## 2018-09-19 DIAGNOSIS — Z79.899 LONG TERM USE OF DRUG: ICD-10-CM

## 2018-09-19 PROCEDURE — 80307 DRUG TEST PRSMV CHEM ANLYZR: CPT

## 2018-09-26 ENCOUNTER — LAB (OUTPATIENT)
Dept: LAB | Facility: HOSPITAL | Age: 23
End: 2018-09-26

## 2018-09-26 DIAGNOSIS — F10.20 ACUTE ALCOHOLISM (HCC): ICD-10-CM

## 2018-09-26 DIAGNOSIS — F17.210 CIGARETTE SMOKER: ICD-10-CM

## 2018-09-26 DIAGNOSIS — Z79.899 LONG TERM USE OF DRUG: ICD-10-CM

## 2018-09-26 DIAGNOSIS — Z79.899 MEDICATION MANAGEMENT: ICD-10-CM

## 2018-09-26 PROCEDURE — 80307 DRUG TEST PRSMV CHEM ANLYZR: CPT

## 2018-09-28 ENCOUNTER — LAB (OUTPATIENT)
Dept: LAB | Facility: HOSPITAL | Age: 23
End: 2018-09-28

## 2018-09-28 DIAGNOSIS — Z79.899 MEDICATION MANAGEMENT: ICD-10-CM

## 2018-09-28 DIAGNOSIS — Z79.899 LONG TERM USE OF DRUG: ICD-10-CM

## 2018-09-28 DIAGNOSIS — F17.210 CIGARETTE SMOKER: ICD-10-CM

## 2018-09-28 DIAGNOSIS — F10.20 ACUTE ALCOHOLISM (HCC): ICD-10-CM

## 2018-09-28 PROCEDURE — 80307 DRUG TEST PRSMV CHEM ANLYZR: CPT

## 2018-10-02 ENCOUNTER — OFFICE VISIT (OUTPATIENT)
Dept: PSYCHIATRY | Facility: CLINIC | Age: 23
End: 2018-10-02

## 2018-10-02 DIAGNOSIS — F33.41 RECURRENT MAJOR DEPRESSION IN PARTIAL REMISSION (HCC): ICD-10-CM

## 2018-10-02 DIAGNOSIS — F43.10 POST TRAUMATIC STRESS DISORDER (PTSD): ICD-10-CM

## 2018-10-02 DIAGNOSIS — F10.21 ALCOHOL DEPENDENCE IN EARLY, EARLY PARTIAL, SUSTAINED FULL, OR SUSTAINED PARTIAL REMISSION (HCC): Primary | ICD-10-CM

## 2018-10-02 PROCEDURE — 90834 PSYTX W PT 45 MINUTES: CPT | Performed by: SOCIAL WORKER
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
## 2018-10-03 ENCOUNTER — LAB (OUTPATIENT)
Dept: LAB | Facility: HOSPITAL | Age: 23
End: 2018-10-03

## 2018-10-03 DIAGNOSIS — F17.210 CIGARETTE SMOKER: ICD-10-CM

## 2018-10-03 DIAGNOSIS — Z79.899 LONG TERM USE OF DRUG: ICD-10-CM

## 2018-10-03 DIAGNOSIS — Z79.899 MEDICATION MANAGEMENT: ICD-10-CM

## 2018-10-03 DIAGNOSIS — F10.20 ACUTE ALCOHOLISM (HCC): ICD-10-CM

## 2018-10-03 PROCEDURE — 80307 DRUG TEST PRSMV CHEM ANLYZR: CPT

## 2018-10-03 NOTE — TREATMENT PLAN
Multi-Disciplinary Problems (from Behavioral Health Treatment Plan)    Active Problems     Problem: Depression  Start Date: 10/03/18    Problem Details:  The patient self-scales this problem as a 5 with 10 being the worst.       Goal Priority Start Date Expected End Date End Date    Patient will demonstrate the ability to initiate new constructive life skills outside of sessions on a consistent basis. -- 10/03/18 10/03/19 --    Goal Details:  Progress toward goal:  Not appropriate to rate progress toward goal since this is the initial treatment plan.       Goal Intervention Frequency Start Date End Date    Assist patient in setting attainable activities of daily living goals. Q2 Weeks 10/03/18 10/03/19    Goal Intervention Frequency Start Date End Date    Provide education about depression Q2 Weeks 10/03/18 10/03/19    Intervention Details:  Duration of treatment until until discharged.       Goal Intervention Frequency Start Date End Date    Assist patient in developing healthy coping strategies. Q2 Weeks 10/03/18 10/03/19    Intervention Details:  Duration of treatment until until discharged.             Problem: Post Traumatic Stress  Start Date: 10/03/18    Problem Details:  The patient self-scales this problem as a 6 with 10 being the worst.       Goal Priority Start Date Expected End Date End Date    Patient will process and move through trauma in a way that improves self regard and the patients ability to function optimally in the world around them. -- 10/03/18 10/03/19 --    Goal Details:  Progress toward goal:  Not appropriate to rate progress toward goal since this is the initial treatment plan.       Goal Intervention Frequency Start Date End Date    Assist patient in identifying ways that trauma has negatively impacted their view of themselves and the world. Q2 Weeks 10/03/18 10/03/19    Intervention Details:  Duration of treatment until until discharged.       Goal Intervention Frequency Start Date End  "Date    Process trauma in the context of the safe session environment. Q2 Weeks 10/03/18 10/03/19    Intervention Details:  Duration of treatment until until discharged.       Goal Intervention Frequency Start Date End Date    Develop a plan of behavior changes that will reduce the stress of the trauma. Q2 Weeks 10/03/18 10/03/19    Intervention Details:  Duration of treatment until until discharged.             Problem: Relapse Events  Start Date: 10/03/18    Problem Details:  The patient self-scales this problem as a 5 with 10 being the worst.       Goal Priority Start Date Expected End Date End Date    Patient will demonstrate ability to address relapse triggers while maintaining \"clean\" behaviors. -- 10/03/18 10/03/19 --    Goal Details:  Progress toward goal:  Not appropriate to rate progress toward goal since this is the initial treatment plan.       Goal Intervention Frequency Start Date End Date    Assist patient in identifying specific triggers to relapse. Q2 Weeks 10/03/18 10/03/19    Intervention Details:  Duration of treatment until until discharged.       Goal Intervention Frequency Start Date End Date    Assist patient in identifying and implementing ways to cope with each identified trigger. Q2 Weeks 10/03/18 10/03/19    Intervention Details:  Duration of treatment until until discharged.                   Reviewed By     Mary Anne Cai LCSW 10/03/18 4058                 I have discussed and reviewed this treatment plan with the patient.  It has been printed for signatures.     "

## 2018-10-05 ENCOUNTER — LAB (OUTPATIENT)
Dept: LAB | Facility: HOSPITAL | Age: 23
End: 2018-10-05

## 2018-10-05 DIAGNOSIS — F17.210 CIGARETTE SMOKER: ICD-10-CM

## 2018-10-05 DIAGNOSIS — Z79.899 MEDICATION MANAGEMENT: ICD-10-CM

## 2018-10-05 DIAGNOSIS — Z79.899 LONG TERM USE OF DRUG: ICD-10-CM

## 2018-10-05 DIAGNOSIS — F10.20 ACUTE ALCOHOLISM (HCC): ICD-10-CM

## 2018-10-05 PROCEDURE — 80307 DRUG TEST PRSMV CHEM ANLYZR: CPT

## 2018-10-10 ENCOUNTER — APPOINTMENT (OUTPATIENT)
Dept: LAB | Facility: HOSPITAL | Age: 23
End: 2018-10-10

## 2018-10-16 ENCOUNTER — OFFICE VISIT (OUTPATIENT)
Dept: PSYCHIATRY | Facility: CLINIC | Age: 23
End: 2018-10-16

## 2018-10-16 DIAGNOSIS — F33.41 RECURRENT MAJOR DEPRESSION IN PARTIAL REMISSION (HCC): ICD-10-CM

## 2018-10-16 DIAGNOSIS — F43.10 POST TRAUMATIC STRESS DISORDER (PTSD): ICD-10-CM

## 2018-10-16 DIAGNOSIS — F10.21 ALCOHOL DEPENDENCE IN EARLY, EARLY PARTIAL, SUSTAINED FULL, OR SUSTAINED PARTIAL REMISSION (HCC): Primary | ICD-10-CM

## 2018-10-16 PROCEDURE — 90834 PSYTX W PT 45 MINUTES: CPT | Performed by: SOCIAL WORKER
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
## 2018-10-17 ENCOUNTER — HOSPITAL ENCOUNTER (EMERGENCY)
Facility: HOSPITAL | Age: 23
Discharge: HOME | End: 2018-10-17
Attending: FAMILY MEDICINE | Admitting: FAMILY MEDICINE

## 2018-10-17 ENCOUNTER — APPOINTMENT (OUTPATIENT)
Dept: CT IMAGING | Facility: HOSPITAL | Age: 23
End: 2018-10-17

## 2018-10-17 VITALS
RESPIRATION RATE: 18 BRPM | WEIGHT: 200 LBS | HEART RATE: 62 BPM | DIASTOLIC BLOOD PRESSURE: 69 MMHG | SYSTOLIC BLOOD PRESSURE: 116 MMHG | OXYGEN SATURATION: 98 % | BODY MASS INDEX: 36.8 KG/M2 | TEMPERATURE: 98.4 F | HEIGHT: 62 IN

## 2018-10-17 DIAGNOSIS — R51.9 ACUTE NONINTRACTABLE HEADACHE, UNSPECIFIED HEADACHE TYPE: Primary | ICD-10-CM

## 2018-10-17 LAB
ALBUMIN SERPL-MCNC: 4.3 G/DL (ref 3.5–5)
ALBUMIN/GLOB SERPL: 1.3 G/DL (ref 1.5–2.5)
ALP SERPL-CCNC: 58 U/L (ref 35–104)
ALT SERPL W P-5'-P-CCNC: 20 U/L (ref 10–36)
ANION GAP SERPL CALCULATED.3IONS-SCNC: 5.3 MMOL/L (ref 3.6–11.2)
AST SERPL-CCNC: 28 U/L (ref 10–30)
B-HCG UR QL: NEGATIVE
BACTERIA UR QL AUTO: ABNORMAL /HPF
BASOPHILS # BLD AUTO: 0.03 10*3/MM3 (ref 0–0.3)
BASOPHILS NFR BLD AUTO: 0.4 % (ref 0–2)
BILIRUB SERPL-MCNC: 0.2 MG/DL (ref 0.2–1.8)
BILIRUB UR QL STRIP: NEGATIVE
BUN BLD-MCNC: 7 MG/DL (ref 7–21)
BUN/CREAT SERPL: 9.3 (ref 7–25)
CALCIUM SPEC-SCNC: 9.4 MG/DL (ref 7.7–10)
CHLORIDE SERPL-SCNC: 110 MMOL/L (ref 99–112)
CLARITY UR: ABNORMAL
CO2 SERPL-SCNC: 22.7 MMOL/L (ref 24.3–31.9)
COLOR UR: ABNORMAL
CREAT BLD-MCNC: 0.75 MG/DL (ref 0.43–1.29)
DEPRECATED RDW RBC AUTO: 42.9 FL (ref 37–54)
EOSINOPHIL # BLD AUTO: 0.01 10*3/MM3 (ref 0–0.7)
EOSINOPHIL NFR BLD AUTO: 0.1 % (ref 0–5)
ERYTHROCYTE [DISTWIDTH] IN BLOOD BY AUTOMATED COUNT: 13 % (ref 11.5–14.5)
GFR SERPL CREATININE-BSD FRML MDRD: 97 ML/MIN/1.73
GLOBULIN UR ELPH-MCNC: 3.3 GM/DL
GLUCOSE BLD-MCNC: 97 MG/DL (ref 70–110)
GLUCOSE UR STRIP-MCNC: NEGATIVE MG/DL
HCT VFR BLD AUTO: 39.6 % (ref 37–47)
HGB BLD-MCNC: 13.1 G/DL (ref 12–16)
HGB UR QL STRIP.AUTO: NEGATIVE
HYALINE CASTS UR QL AUTO: ABNORMAL /LPF
IMM GRANULOCYTES # BLD: 0.01 10*3/MM3 (ref 0–0.03)
IMM GRANULOCYTES NFR BLD: 0.1 % (ref 0–0.5)
KETONES UR QL STRIP: ABNORMAL
LEUKOCYTE ESTERASE UR QL STRIP.AUTO: ABNORMAL
LYMPHOCYTES # BLD AUTO: 1.28 10*3/MM3 (ref 1–3)
LYMPHOCYTES NFR BLD AUTO: 15.5 % (ref 21–51)
MCH RBC QN AUTO: 30 PG (ref 27–33)
MCHC RBC AUTO-ENTMCNC: 33.1 G/DL (ref 33–37)
MCV RBC AUTO: 90.8 FL (ref 80–94)
MONOCYTES # BLD AUTO: 0.85 10*3/MM3 (ref 0.1–0.9)
MONOCYTES NFR BLD AUTO: 10.3 % (ref 0–10)
NEUTROPHILS # BLD AUTO: 6.09 10*3/MM3 (ref 1.4–6.5)
NEUTROPHILS NFR BLD AUTO: 73.6 % (ref 30–70)
NITRITE UR QL STRIP: NEGATIVE
OSMOLALITY SERPL CALC.SUM OF ELEC: 273.6 MOSM/KG (ref 273–305)
PH UR STRIP.AUTO: 6.5 [PH] (ref 5–8)
PLATELET # BLD AUTO: 269 10*3/MM3 (ref 130–400)
PMV BLD AUTO: 9.3 FL (ref 6–10)
POTASSIUM BLD-SCNC: 4 MMOL/L (ref 3.5–5.3)
PROT SERPL-MCNC: 7.6 G/DL (ref 6–8)
PROT UR QL STRIP: NEGATIVE
RBC # BLD AUTO: 4.36 10*6/MM3 (ref 4.2–5.4)
RBC # UR: ABNORMAL /HPF
REF LAB TEST METHOD: ABNORMAL
SODIUM BLD-SCNC: 138 MMOL/L (ref 135–153)
SP GR UR STRIP: 1.02 (ref 1–1.03)
SQUAMOUS #/AREA URNS HPF: ABNORMAL /HPF
UROBILINOGEN UR QL STRIP: ABNORMAL
WBC NRBC COR # BLD: 8.27 10*3/MM3 (ref 4.5–12.5)
WBC UR QL AUTO: ABNORMAL /HPF

## 2018-10-17 PROCEDURE — 96361 HYDRATE IV INFUSION ADD-ON: CPT

## 2018-10-17 PROCEDURE — 70450 CT HEAD/BRAIN W/O DYE: CPT

## 2018-10-17 PROCEDURE — 96375 TX/PRO/DX INJ NEW DRUG ADDON: CPT

## 2018-10-17 PROCEDURE — 96372 THER/PROPH/DIAG INJ SC/IM: CPT

## 2018-10-17 PROCEDURE — 81001 URINALYSIS AUTO W/SCOPE: CPT | Performed by: PHYSICIAN ASSISTANT

## 2018-10-17 PROCEDURE — 85025 COMPLETE CBC W/AUTO DIFF WBC: CPT | Performed by: PHYSICIAN ASSISTANT

## 2018-10-17 PROCEDURE — 25010000002 ORPHENADRINE CITRATE PER 60 MG: Performed by: PHYSICIAN ASSISTANT

## 2018-10-17 PROCEDURE — 70450 CT HEAD/BRAIN W/O DYE: CPT | Performed by: RADIOLOGY

## 2018-10-17 PROCEDURE — 25010000002 KETOROLAC TROMETHAMINE PER 15 MG: Performed by: PHYSICIAN ASSISTANT

## 2018-10-17 PROCEDURE — 25010000002 METOCLOPRAMIDE PER 10 MG: Performed by: PHYSICIAN ASSISTANT

## 2018-10-17 PROCEDURE — 80053 COMPREHEN METABOLIC PANEL: CPT | Performed by: PHYSICIAN ASSISTANT

## 2018-10-17 PROCEDURE — 99284 EMERGENCY DEPT VISIT MOD MDM: CPT

## 2018-10-17 PROCEDURE — 81025 URINE PREGNANCY TEST: CPT | Performed by: PHYSICIAN ASSISTANT

## 2018-10-17 PROCEDURE — 96374 THER/PROPH/DIAG INJ IV PUSH: CPT

## 2018-10-17 RX ORDER — ORPHENADRINE CITRATE 30 MG/ML
60 INJECTION INTRAMUSCULAR; INTRAVENOUS ONCE
Status: COMPLETED | OUTPATIENT
Start: 2018-10-17 | End: 2018-10-17

## 2018-10-17 RX ORDER — METOCLOPRAMIDE HYDROCHLORIDE 5 MG/ML
10 INJECTION INTRAMUSCULAR; INTRAVENOUS ONCE
Status: COMPLETED | OUTPATIENT
Start: 2018-10-17 | End: 2018-10-17

## 2018-10-17 RX ORDER — KETOROLAC TROMETHAMINE 30 MG/ML
30 INJECTION, SOLUTION INTRAMUSCULAR; INTRAVENOUS ONCE
Status: COMPLETED | OUTPATIENT
Start: 2018-10-17 | End: 2018-10-17

## 2018-10-17 RX ORDER — SODIUM CHLORIDE 0.9 % (FLUSH) 0.9 %
10 SYRINGE (ML) INJECTION AS NEEDED
Status: DISCONTINUED | OUTPATIENT
Start: 2018-10-17 | End: 2018-10-17 | Stop reason: HOSPADM

## 2018-10-17 RX ADMIN — ORPHENADRINE CITRATE 60 MG: 30 INJECTION INTRAMUSCULAR; INTRAVENOUS at 07:33

## 2018-10-17 RX ADMIN — SODIUM CHLORIDE 1000 ML: 9 INJECTION, SOLUTION INTRAVENOUS at 05:57

## 2018-10-17 RX ADMIN — METOCLOPRAMIDE 10 MG: 5 INJECTION, SOLUTION INTRAMUSCULAR; INTRAVENOUS at 07:28

## 2018-10-17 RX ADMIN — KETOROLAC TROMETHAMINE 30 MG: 30 INJECTION, SOLUTION INTRAMUSCULAR; INTRAVENOUS at 07:26

## 2018-10-17 NOTE — ED NOTES
Patient presents to the ER with patient's family due to a headache that began at 0130. Patient states that she was diagnosed with an ear infection on 10/15/2018 that she was started on augmentin for. Patient states that after headache began she began experiencing pain down into neck and into upper back. Patient maintains full range of motion to neck with no difficulties. Patient states that she has had intermittent fevers since she was diagnosed with the ear infection. Patient states she has a history of migraines and reports headache is similar to previous migraines. Patient updated on plan of care, fall reduction program in place, OPAL, will continue to monitor and follow plan of care.     Roxy Rosales, RN  10/17/18 9534

## 2018-10-17 NOTE — ED PROVIDER NOTES
Subjective     History provided by:  Patient   used: No    Headache   Pain location:  Occipital  Quality:  Dull  Severity currently:  10/10  Severity at highest:  10/10  Onset quality:  Sudden  Duration:  2 days  Timing:  Constant  Progression:  Worsening  Chronicity:  New  Similar to prior headaches: no    Context comment:  Pt stated she was diagnosed with tonsilitis and ear infection yesterday at [facility] and dc home with Augmentin   Relieved by:  Nothing  Worsened by:  Nothing  Ineffective treatments:  None tried  Risk factors: no anger, no family hx of SAH, does not have insomnia and lifestyle not sedentary        Review of Systems   Constitutional: Negative.    Eyes: Negative.    Respiratory: Negative.    Cardiovascular: Negative.    Gastrointestinal: Negative.    Endocrine: Negative.    Genitourinary: Negative.    Musculoskeletal: Negative.    Skin: Negative.    Allergic/Immunologic: Negative.    Neurological: Positive for headaches.   Hematological: Negative.    Psychiatric/Behavioral: Negative.    All other systems reviewed and are negative.      Past Medical History:   Diagnosis Date   • Alcohol abuse    • Anxiety    • Depression    • Disease of thyroid gland        No Known Allergies    Past Surgical History:   Procedure Laterality Date   • BREAST BIOPSY     • MOUTH SURGERY         Family History   Problem Relation Age of Onset   • No Known Problems Mother    • No Known Problems Father        Social History     Social History   • Marital status: Single     Social History Main Topics   • Smoking status: Current Every Day Smoker     Packs/day: 0.50     Types: Cigarettes   • Smokeless tobacco: Never Used   • Alcohol use Yes   • Drug use: No   • Sexual activity: Yes     Partners: Male     Birth control/ protection: None     Other Topics Concern   • Not on file           Objective   Physical Exam   Constitutional: She is oriented to person, place, and time. She appears well-developed and  well-nourished.   HENT:   Head: Normocephalic and atraumatic.   Right Ear: External ear normal.   Left Ear: External ear normal. Tympanic membrane is erythematous.   Nose: Nose normal.   Mouth/Throat: Posterior oropharyngeal erythema present.   Eyes: Pupils are equal, round, and reactive to light. Conjunctivae and EOM are normal.   Neck: Normal range of motion. Neck supple. No JVD present. Muscular tenderness present. No spinous process tenderness present. No neck rigidity. No tracheal deviation, no edema, no erythema and normal range of motion present. No Brudzinski's sign and no Kernig's sign noted. No thyromegaly present.   Cardiovascular: Normal rate, regular rhythm, normal heart sounds and intact distal pulses.    Pulmonary/Chest: Effort normal and breath sounds normal. No stridor.   Abdominal: Soft. Bowel sounds are normal.   Musculoskeletal: Normal range of motion.   Lymphadenopathy:     She has no cervical adenopathy.   Neurological: She is alert and oriented to person, place, and time.   Skin: Skin is warm and dry.   Nursing note and vitals reviewed.      Procedures           ED Course  ED Course as of Oct 17 0727   Wed Oct 17, 2018   0718 No acute intracranial abnormality per [facility]  CT Head Without Contrast [ML]      ED Course User Index  [ML] Kathie Garcia PA                  [facility]      Final diagnoses:   Acute nonintractable headache, unspecified headache type            Kathie Garcia PA  10/17/18 0766

## 2018-10-19 ENCOUNTER — LAB (OUTPATIENT)
Dept: LAB | Facility: HOSPITAL | Age: 23
End: 2018-10-19

## 2018-10-19 ENCOUNTER — TELEPHONE (OUTPATIENT)
Dept: PSYCHIATRY | Facility: CLINIC | Age: 23
End: 2018-10-19

## 2018-10-19 DIAGNOSIS — Z79.899 MEDICATION MANAGEMENT: ICD-10-CM

## 2018-10-19 DIAGNOSIS — F17.210 CIGARETTE SMOKER: ICD-10-CM

## 2018-10-19 DIAGNOSIS — Z79.899 LONG TERM USE OF DRUG: ICD-10-CM

## 2018-10-19 DIAGNOSIS — F10.20 ACUTE ALCOHOLISM (HCC): ICD-10-CM

## 2018-10-19 PROCEDURE — 80307 DRUG TEST PRSMV CHEM ANLYZR: CPT

## 2018-10-24 ENCOUNTER — LAB (OUTPATIENT)
Dept: LAB | Facility: HOSPITAL | Age: 23
End: 2018-10-24

## 2018-10-24 DIAGNOSIS — F10.20 ACUTE ALCOHOLISM (HCC): ICD-10-CM

## 2018-10-24 DIAGNOSIS — Z79.899 MEDICATION MANAGEMENT: ICD-10-CM

## 2018-10-24 DIAGNOSIS — F17.210 CIGARETTE SMOKER: ICD-10-CM

## 2018-10-24 DIAGNOSIS — Z79.899 LONG TERM USE OF DRUG: ICD-10-CM

## 2018-10-24 PROCEDURE — 80307 DRUG TEST PRSMV CHEM ANLYZR: CPT

## 2018-10-26 ENCOUNTER — LAB (OUTPATIENT)
Dept: LAB | Facility: HOSPITAL | Age: 23
End: 2018-10-26

## 2018-10-26 ENCOUNTER — OFFICE VISIT (OUTPATIENT)
Dept: PSYCHIATRY | Facility: CLINIC | Age: 23
End: 2018-10-26

## 2018-10-26 DIAGNOSIS — F43.10 POST TRAUMATIC STRESS DISORDER (PTSD): ICD-10-CM

## 2018-10-26 DIAGNOSIS — F10.20 ACUTE ALCOHOLISM (HCC): ICD-10-CM

## 2018-10-26 DIAGNOSIS — F33.41 RECURRENT MAJOR DEPRESSION IN PARTIAL REMISSION (HCC): ICD-10-CM

## 2018-10-26 DIAGNOSIS — Z79.899 LONG TERM USE OF DRUG: ICD-10-CM

## 2018-10-26 DIAGNOSIS — Z79.899 MEDICATION MANAGEMENT: ICD-10-CM

## 2018-10-26 DIAGNOSIS — F17.210 CIGARETTE SMOKER: ICD-10-CM

## 2018-10-26 DIAGNOSIS — F10.21 ALCOHOL DEPENDENCE IN EARLY, EARLY PARTIAL, SUSTAINED FULL, OR SUSTAINED PARTIAL REMISSION (HCC): Primary | ICD-10-CM

## 2018-10-26 PROCEDURE — 80307 DRUG TEST PRSMV CHEM ANLYZR: CPT

## 2018-10-26 PROCEDURE — 90832 PSYTX W PT 30 MINUTES: CPT | Performed by: COUNSELOR

## 2018-10-26 NOTE — PROGRESS NOTES
Date of Service: October 26, 2018  Time In: 9:40 AM  Time Out: 10:10 AM      PROGRESS NOTE  Data:  Veronica Moore is a 23 y.o. female who met with the undersigned for an individual outpatient therapy session at the [facility].  She rated her anxiety level as of 1 depression as a 3 on a scale from 1-10 where 10 is the most.  She denied craving.  She reported some problems with intermittent insomnia.  She said that she had to discontinue Rexulti due to side effects and will begin Latuda today.  She has noticed that she feels sad and negative in the evenings. UDS/Alcohol screen: negative       HPI: Patient arrived 40 minutes late for a 1 hour group therapy session due to traffic problems so an individual session was done instead.  We took the time to review her progress in IOP Phase II.  She has had 3 absences and provided medical excuses for 2 of them.  She has been tardy 3 times on Fridays due to taking her stepdaughter to school.  All of her urine drug/alcohol screens have been negative.  She meets with her individual therapist, Mary Anne Cai LCSW every 2 weeks to address depression and PTSD issues.  She attends 2 12-step support group meetings weekly and completes 2 online meetings weekly.    Clinical Maneuvering/Intervention:  Assisted patient in processing above session content; acknowledged and normalized patient’s thoughts, feelings, and concerns.  She was told make other arrangements to get her stepdaughter to school on Fridays and to make attending group on time a priority and she verbalized agreement.  She was reminded to complete her personal journal in preparation for completing Phase II.  Her treatment was extended until 11/9/2018.  She was praised for her progress and active participation in group.    Allowed patient to freely discuss issues without interruption or judgment. Provided safe, confidential environment to facilitate the development of positive therapeutic relationship and encourage  open, honest communication. Assisted patient in identifying risk factors which would indicate the need for higher level of care including thoughts to harm self or others and/or self-harming behavior and encouraged patient to contact this office, call 911, or present to the nearest emergency room should any of these events occur. Discussed crisis intervention services and means to access.  Patient adamantly and convincingly denies current suicidal or homicidal ideation or perceptual disturbance.    Assessment     Diagnoses and all orders for this visit:    Alcohol dependence in early, early partial, sustained full, or sustained partial remission (CMS/HCC)    Recurrent major depression in partial remission (CMS/HCC)    Post traumatic stress disorder (PTSD)             Mental Status Exam  Hygiene:  good  Dress:  casual  Attitude:  Cooperative  Motor Activity:  Appropriate  Speech:  Normal  Mood:  within normal limits  Affect:  calm and pleasant  Thought Processes:  Goal directed  Thought Content:  normal  Suicidal Thoughts:  denies  Homicidal Thoughts:  denies  Crisis Safety Plan: yes, to come to the emergency room.  Hallucinations:  denies    Patient's Support Network Includes:  significant other, parents and Stepdaughter    Progress toward goal: At goal    Functional Status: No impairment    Prognosis: Good with Ongoing Treatment     Plan         Patient will adhere to medication regimen as prescribed and report any side effects. Patient will contact this office, call 911 or present to the nearest emergency room should suicidal or homicidal ideations occur. Provide Cognitive Behavioral Therapy and Integrative Therapy to improve functioning, maintain stability, and avoid decompensation and the need for higher level of care.          Return in about 5 days (around 10/31/2018).      This document signed by ROCKY Cole, [facility] October 26, 2018 11:08 AM

## 2018-10-30 ENCOUNTER — OFFICE VISIT (OUTPATIENT)
Dept: PSYCHIATRY | Facility: CLINIC | Age: 23
End: 2018-10-30

## 2018-10-30 DIAGNOSIS — F43.10 POST TRAUMATIC STRESS DISORDER (PTSD): ICD-10-CM

## 2018-10-30 DIAGNOSIS — F33.41 RECURRENT MAJOR DEPRESSION IN PARTIAL REMISSION (HCC): ICD-10-CM

## 2018-10-30 DIAGNOSIS — F10.21 ALCOHOL DEPENDENCE IN EARLY, EARLY PARTIAL, SUSTAINED FULL, OR SUSTAINED PARTIAL REMISSION (HCC): Primary | ICD-10-CM

## 2018-10-30 PROCEDURE — 90834 PSYTX W PT 45 MINUTES: CPT | Performed by: SOCIAL WORKER

## 2018-10-31 ENCOUNTER — LAB (OUTPATIENT)
Dept: LAB | Facility: HOSPITAL | Age: 23
End: 2018-10-31

## 2018-10-31 DIAGNOSIS — Z79.899 MEDICATION MANAGEMENT: ICD-10-CM

## 2018-10-31 DIAGNOSIS — F17.210 CIGARETTE SMOKER: ICD-10-CM

## 2018-10-31 DIAGNOSIS — Z79.899 LONG TERM USE OF DRUG: ICD-10-CM

## 2018-10-31 DIAGNOSIS — F10.20 ACUTE ALCOHOLISM (HCC): ICD-10-CM

## 2018-10-31 PROCEDURE — 80307 DRUG TEST PRSMV CHEM ANLYZR: CPT

## 2018-11-02 ENCOUNTER — OFFICE VISIT (OUTPATIENT)
Dept: PSYCHIATRY | Facility: CLINIC | Age: 23
End: 2018-11-02

## 2018-11-02 ENCOUNTER — LAB (OUTPATIENT)
Dept: LAB | Facility: HOSPITAL | Age: 23
End: 2018-11-02

## 2018-11-02 DIAGNOSIS — F10.21 ALCOHOL DEPENDENCE IN EARLY, EARLY PARTIAL, SUSTAINED FULL, OR SUSTAINED PARTIAL REMISSION (HCC): Primary | ICD-10-CM

## 2018-11-02 DIAGNOSIS — F17.210 CIGARETTE SMOKER: ICD-10-CM

## 2018-11-02 DIAGNOSIS — F10.20 ACUTE ALCOHOLISM (HCC): ICD-10-CM

## 2018-11-02 DIAGNOSIS — Z79.899 LONG TERM USE OF DRUG: ICD-10-CM

## 2018-11-02 DIAGNOSIS — F33.41 RECURRENT MAJOR DEPRESSION IN PARTIAL REMISSION (HCC): ICD-10-CM

## 2018-11-02 DIAGNOSIS — F43.10 POST TRAUMATIC STRESS DISORDER (PTSD): ICD-10-CM

## 2018-11-02 DIAGNOSIS — Z79.899 MEDICATION MANAGEMENT: ICD-10-CM

## 2018-11-02 PROCEDURE — 80307 DRUG TEST PRSMV CHEM ANLYZR: CPT

## 2018-11-02 PROCEDURE — 90853 GROUP PSYCHOTHERAPY: CPT | Performed by: COUNSELOR

## 2018-11-02 NOTE — GROUP NOTE
November 2   9:00 AM  10:05 AM  SUBSTANCE ABUSE  [facility] PHASE II GROUP NOTE    DATA:    1 hour group therapy session (Topics Addressed)     The therapist reviewed the rules and requirements related to absenteeism and 12 step group participation during treatment in [facility] Phase II.  When there is an absence, the participant needs to call before time for group.  If there is a life-threatening emergency documentation must be presented upon return to group.  If group is missed, a UDS is still required for that day.  Documentation must prove attendance at a minimum of 4 12-step support group meetings weekly.    RESPONSE: She reported feeling nervous about court today.  She talked to her mother and father last night and they are supportive.  Her dad told her for the first time about his history of problems with alcohol and his past arrest for DUI.  They provided the $5000 for her bond and agreed to provide property bond if necessary to keep her from going to nursing home.  She is upset that she will not be able to work until this is resolved because she had planned to buy [employer] with the money she earned.  She is afraid she will get depressed and bored if she is not working.    CLINICAL MANUVERING/INTERVENTIONS: Assisted member in processing above session content; acknowledged and normalized patient's thoughts, feelings and concerns.  It was suggested that she could focus on her schoolwork and make crafts to use for gifts this year to occupy her time.  She was given a letter confirming participation in the [facility] program for court.    Allowed patient to freely discuss issues without interruption or judgment. Provided safe, confidential environment to facilitate the development of positive therapeutic relationship and encourage open, honest communication. Assisted patient in identifying risk factors which would indicate the need for higher level of care including thoughts to harm self or others and/or self-harming behavior and  encouraged patient to contact this office, call 911, or present to the nearest emergency room should any of these events occur. Discussed crisis intervention services and means to access.  Patient adamantly and convincingly denies current suicidal or homicidal ideation or perceptual disturbance.    ASSESSMENT:  Engaged in activity/Process and self-disclosed: Yes or No  Affect ([place] all that apply) appropriate, blunted, flat, sad, angry, tearful, anxious, bright  Applies topic to self: Yes or No  Able to give and receive feedback: Yes or No  Degree of insightful thinking: Least 1  2  3  4  5  6  7 8  9  10  Most    Urinalysis: Negative    12-Step attendance: Frequency/Sponsor? 4 meetings and regular contact with sponsor    Identification of environmental and personal barriers to recovery: Legal issues, unemployment    Motivation for treatment: Health, legal    Mental Status Exam  Hygiene:  good  Dress:  casual  Attitude:  Cooperative  Motor Activity:  Appropriate  Speech:  Normal  Mood:  anxious  Affect:  anxious  Thought Processes:  Goal directed  Thought Content:  normal  Suicidal Thoughts:  denies  Homicidal Thoughts:  denies  Crisis Safety Plan: yes, to come to the emergency room.  Hallucinations:  denies    Assessment     Diagnoses and all orders for this visit:    Alcohol dependence in early, early partial, sustained full, or sustained partial remission (CMS/HCC)    Recurrent major depression in partial remission (CMS/HCC)    Post traumatic stress disorder (PTSD)           PLAN:   Patient will continue in bi-weekly group psychotherapy sessions and individual outpatient psychotherapy sessions every 3-4 weeks and will continue in self-help meetings and seek additional treatment if necessary following completion.    Lyubov Beasley, LPCC, [facility] Center[facility]

## 2018-11-07 ENCOUNTER — DOCUMENTATION (OUTPATIENT)
Dept: PSYCHIATRY | Facility: CLINIC | Age: 23
End: 2018-11-07

## 2018-11-07 ENCOUNTER — LAB (OUTPATIENT)
Dept: LAB | Facility: HOSPITAL | Age: 23
End: 2018-11-07

## 2018-11-07 DIAGNOSIS — F17.210 CIGARETTE SMOKER: ICD-10-CM

## 2018-11-07 DIAGNOSIS — Z79.899 MEDICATION MANAGEMENT: ICD-10-CM

## 2018-11-07 DIAGNOSIS — Z79.899 LONG TERM USE OF DRUG: ICD-10-CM

## 2018-11-07 DIAGNOSIS — F10.20 ACUTE ALCOHOLISM (HCC): ICD-10-CM

## 2018-11-07 PROCEDURE — 80307 DRUG TEST PRSMV CHEM ANLYZR: CPT

## 2018-11-07 NOTE — PROGRESS NOTES
[facility]  BEHAVIORAL HEALTH TREATMENT PLAN  DATE: 08/01/18  Long Term Goal: Maintain sobriety and develop recovery skills to improve her  level of functioning.  Patient Care Needs Objectives Target Date Extend Date Date Met Interventions Responsible Team Member    Relapse Risk: Patient presents with ALCOHOL Dependence and is at high risk for relapse.  Patient has long history of substance use with minimal periods of abstinence.     Case Management:   Patient presents with house, transportation, and difficulties navigating/accessing community resources to meet normal activities of daily life.       1.Patient to display an increase in DASES AND URICA scores indicating increased confidence in her ability to maintain abstinence.  A. Identify high risk people, places, and situations that must be avoided or managed to prevent relapse.  B. Identify specific recovery action steps she can take when faced with high risk situations.  C. Attend group sessions as scheduled and participate by giving and receiving feedback.  D. Develop a positive network of support by attending a minimum of four 12 step support groups weekly and maintain sponsorship.  E. Write a personal recovery plan and share it with the group prior to discharge.  F. Identify healthy coping strategies to manage difficult emotions without using drugs or alcohol.  G.Complete   12-step/12 traditions review prior to discharge  H.  Complete a screening assessment and identify steps to resolve issues that might hinder independent sober living. 11/9/18    /   /       /   /    1a. Educate patient about the disease concept and relapse prevention skills.  B. Provide Narcotics/Alcoholics  Anonymous books and other recovery literature.  C. Provide random alcohol and drug screening.  D. IOP groups provided 2 times weekly.  E. Introduce patient to 12-step recovery groups and encourage the patient to get a sponsor.  F. Assist patient in the development  of a personal recovery plan.  G.. Teach cognitive behavioral techniques to dispute irrational beliefs.  H. Family is invited to attend weekly family education sessions every week.  I. Provide progress reports as needed.  J. Facilitate coordination, communication, and collaboration with consumers, providers, ancillary services, and others in order to achieve goals and maximize positive  outcomes based upon the individual assessment.                            MD Signature        Date/ Time            Therapist Signature     Date/Time                 Discharge Criteria Plan: Patient to complete treatment objectives while displaying regular attendance and negative drug/alcohol screens.                   I have discussed and reviewed this treatment plan with the patient.  It has been printed for signatures.

## 2018-11-08 ENCOUNTER — OFFICE VISIT (OUTPATIENT)
Dept: PSYCHIATRY | Facility: CLINIC | Age: 23
End: 2018-11-08

## 2018-11-08 VITALS
SYSTOLIC BLOOD PRESSURE: 121 MMHG | BODY MASS INDEX: 38.28 KG/M2 | DIASTOLIC BLOOD PRESSURE: 85 MMHG | WEIGHT: 208 LBS | HEIGHT: 62 IN | HEART RATE: 80 BPM

## 2018-11-08 DIAGNOSIS — F43.10 POST TRAUMATIC STRESS DISORDER (PTSD): ICD-10-CM

## 2018-11-08 DIAGNOSIS — F10.21 ALCOHOL DEPENDENCE IN EARLY, EARLY PARTIAL, SUSTAINED FULL, OR SUSTAINED PARTIAL REMISSION (HCC): ICD-10-CM

## 2018-11-08 DIAGNOSIS — F51.5 NIGHTMARES: ICD-10-CM

## 2018-11-08 DIAGNOSIS — F33.1 MAJOR DEPRESSIVE DISORDER, RECURRENT EPISODE, MODERATE (HCC): Primary | ICD-10-CM

## 2018-11-08 DIAGNOSIS — F17.210 CIGARETTE NICOTINE DEPENDENCE WITHOUT COMPLICATION: ICD-10-CM

## 2018-11-08 PROCEDURE — 99213 OFFICE O/P EST LOW 20 MIN: CPT | Performed by: NURSE PRACTITIONER

## 2018-11-08 RX ORDER — PRAZOSIN HYDROCHLORIDE 2 MG/1
2-4 CAPSULE ORAL NIGHTLY
Qty: 60 CAPSULE | Refills: 1 | Status: ON HOLD | OUTPATIENT
Start: 2018-11-08 | End: 2019-09-24

## 2018-11-08 RX ORDER — LURASIDONE HYDROCHLORIDE 20 MG/1
20 TABLET, FILM COATED ORAL DAILY
Status: ON HOLD | COMMUNITY
End: 2019-09-24

## 2018-11-08 NOTE — PROGRESS NOTES
"Subjective   Patient ID: Veronica Moore is a 23 y.o. female is here today for follow-up    /85   Pulse 80   Ht 157.5 cm (62.01\")   Wt 94.3 kg (208 lb)   BMI 38.03 kg/m²     Patient has no known allergies.  CC: IOP f/u med management  History of Present Illness   Patient presents for follow-up. She reports doing well in IOP. She is currently in Phase II and reports she could have moved to Phase III on Friday but has chosen to remain in Phase II a little longer as she feels this will be more beneficial to her success. She reports last Vivitrol injection was two months ago. She reports she stopped receiving the injection due to headaches. She reports she has done well without the injection and has not had any relapses. She reports a worsening in depression over the last couple of weeks and contributes it to the time of year. She reports hypersomnia, low mood, poor motivation and interest. She reports nightmares improved since starting Prazosin. She rates depression 6/10 and anxiety 3/10 with 10 being the worst. She reports anxiety has not been bad. She shares she becomes irritable when she feels anxious. She denies panic attacks. Patient shares she has a psychiatric nurse practitioner she has seen in [place] for the past four years and has a follow-up appointment there next week and will discuss symptoms and medication changes. She reports the only medication she receives from this clinic is her Prazosin, the other medications are prescribed by the psychiatric nurse practitioner in [place]. She denies suicidal and homicidal ideations. She denies visual and auditory hallucination. She reports decrease in appetite. She reports compliance with current medications and is tolerating without side effects. She denies any new stressors or medical concerns.       The following portions of the patient's history were reviewed and updated as appropriate: allergies, current medications, past family history, past " medical history, past social history, past surgical history and problem list.      Review of Systems   Constitutional: Positive for fatigue. Negative for activity change and appetite change.   HENT: Negative.    Eyes: Negative for visual disturbance.   Respiratory: Negative.    Cardiovascular: Negative.    Gastrointestinal: Negative for nausea.   Endocrine: Negative.    Genitourinary: Negative.    Musculoskeletal: Negative for arthralgias.   Skin: Negative.    Allergic/Immunologic: Negative.    Neurological: Negative for dizziness, seizures and headaches.   Hematological: Negative.    Psychiatric/Behavioral: Positive for sleep disturbance. Negative for agitation, behavioral problems, confusion, decreased concentration, dysphoric mood, hallucinations, self-injury and suicidal ideas. The patient is nervous/anxious. The patient is not hyperactive.        Objective   Mental Status Exam  Appearance:  clean and casually dressed, appropriate  Attitude toward clinician:  cooperative and agreeable   Speech:    Rate:  regular rate and rhythm   Volume:  normal  Motor:  no abnormal movements present  Mood:  Good  Affect:  Restricted   Thought Processes:  linear, logical, and goal directed  Thought Content:  normal  Suicidal Thoughts:  absent  Homicidal Thoughts:  absent  Perceptual Disturbance: no perceptual disturbance  Attention and Concentration:  good  Insight and Judgement:  good  Memory:  memory appears to be intact    MEDICATION ISSUES:    Lab Review:   Lab on 11/07/2018   Component Date Value   • Amphetamine Screen, Urine 11/07/2018 Negative    • Barbiturates Screen, Uri* 11/07/2018 Negative    • Benzodiazepine Screen, U* 11/07/2018 Negative    • Cocaine Screen, Urine 11/07/2018 Negative    • Methadone Screen, Urine 11/07/2018 Negative    • Opiate Screen 11/07/2018 Negative    • Phencyclidine (PCP), Uri* 11/07/2018 Negative    • THC, Screen, Urine 11/07/2018 Negative    • 6-ACETYL MORPHINE 11/07/2018 Negative    •  Buprenorphine, Screen, U* 11/07/2018 Negative    • Oxycodone Screen, Urine 11/07/2018 Negative    • ETHANOL URINE SCREEN 11/07/2018 Negative    Lab on 11/02/2018   Component Date Value   • Amphetamine Screen, Urine 11/02/2018 Negative    • Barbiturates Screen, Uri* 11/02/2018 Negative    • Benzodiazepine Screen, U* 11/02/2018 Negative    • Cocaine Screen, Urine 11/02/2018 Negative    • Methadone Screen, Urine 11/02/2018 Negative    • Opiate Screen 11/02/2018 Negative    • Phencyclidine (PCP), Uri* 11/02/2018 Negative    • THC, Screen, Urine 11/02/2018 Negative    • 6-ACETYL MORPHINE 11/02/2018 Negative    • Buprenorphine, Screen, U* 11/02/2018 Negative    • Oxycodone Screen, Urine 11/02/2018 Negative    • ETHANOL URINE SCREEN 11/02/2018 Negative    Lab on 10/31/2018   Component Date Value   • Amphetamine Screen, Urine 10/31/2018 Negative    • Barbiturates Screen, Uri* 10/31/2018 Negative    • Benzodiazepine Screen, U* 10/31/2018 Negative    • Cocaine Screen, Urine 10/31/2018 Negative    • Methadone Screen, Urine 10/31/2018 Negative    • Opiate Screen 10/31/2018 Negative    • Phencyclidine (PCP), Uri* 10/31/2018 Negative    • THC, Screen, Urine 10/31/2018 Negative    • 6-ACETYL MORPHINE 10/31/2018 Negative    • Buprenorphine, Screen, U* 10/31/2018 Negative    • Oxycodone Screen, Urine 10/31/2018 Negative    • ETHANOL URINE SCREEN 10/31/2018 Negative    Lab on 10/26/2018   Component Date Value   • Amphetamine Screen, Urine 10/26/2018 Negative    • Barbiturates Screen, Uri* 10/26/2018 Negative    • Benzodiazepine Screen, U* 10/26/2018 Negative    • Cocaine Screen, Urine 10/26/2018 Negative    • Methadone Screen, Urine 10/26/2018 Negative    • Opiate Screen 10/26/2018 Negative    • Phencyclidine (PCP), Uri* 10/26/2018 Negative    • THC, Screen, Urine 10/26/2018 Negative    • 6-ACETYL MORPHINE 10/26/2018 Negative    • Buprenorphine, Screen, U* 10/26/2018 Negative    • Oxycodone Screen, Urine 10/26/2018 Negative    •  ETHANOL URINE SCREEN 10/26/2018 Negative    Lab on 10/24/2018   Component Date Value   • Amphetamine Screen, Urine 10/24/2018 Negative    • Barbiturates Screen, Uri* 10/24/2018 Negative    • Benzodiazepine Screen, U* 10/24/2018 Negative    • Cocaine Screen, Urine 10/24/2018 Negative    • Methadone Screen, Urine 10/24/2018 Negative    • Opiate Screen 10/24/2018 Negative    • Phencyclidine (PCP), Uri* 10/24/2018 Negative    • THC, Screen, Urine 10/24/2018 Negative    • 6-ACETYL MORPHINE 10/24/2018 Negative    • Buprenorphine, Screen, U* 10/24/2018 Negative    • Oxycodone Screen, Urine 10/24/2018 Negative    • ETHANOL URINE SCREEN 10/24/2018 Negative    Lab on 10/19/2018   Component Date Value   • Amphetamine Screen, Urine 10/19/2018 Negative    • Barbiturates Screen, Uri* 10/19/2018 Negative    • Benzodiazepine Screen, U* 10/19/2018 Negative    • Cocaine Screen, Urine 10/19/2018 Negative    • Methadone Screen, Urine 10/19/2018 Negative    • Opiate Screen 10/19/2018 Negative    • Phencyclidine (PCP), Uri* 10/19/2018 Negative    • THC, Screen, Urine 10/19/2018 Negative    • 6-ACETYL MORPHINE 10/19/2018 Negative    • Buprenorphine, Screen, U* 10/19/2018 Negative    • Oxycodone Screen, Urine 10/19/2018 Negative    • ETHANOL URINE SCREEN 10/19/2018 Negative    Admission on 10/17/2018, Discharged on 10/17/2018   Component Date Value   • Glucose 10/17/2018 97    • BUN 10/17/2018 7    • Creatinine 10/17/2018 0.75    • Sodium 10/17/2018 138    • Potassium 10/17/2018 4.0    • Chloride 10/17/2018 110    • CO2 10/17/2018 22.7*   • Calcium 10/17/2018 9.4    • Total Protein 10/17/2018 7.6    • Albumin 10/17/2018 4.30    • ALT (SGPT) 10/17/2018 20    • AST (SGOT) 10/17/2018 28    • Alkaline Phosphatase 10/17/2018 58    • Total Bilirubin 10/17/2018 0.2    • eGFR Non  Amer 10/17/2018 97    • Globulin 10/17/2018 3.3    • A/G Ratio 10/17/2018 1.3*   • BUN/Creatinine Ratio 10/17/2018 9.3    • Anion Gap 10/17/2018 5.3    • Color,  UA 10/17/2018 Dark Yellow*   • Appearance, UA 10/17/2018 Cloudy*   • pH, UA 10/17/2018 6.5    • Specific Gravity, UA 10/17/2018 1.024    • Glucose, UA 10/17/2018 Negative    • Ketones, UA 10/17/2018 Trace*   • Bilirubin, UA 10/17/2018 Negative    • Blood, UA 10/17/2018 Negative    • Protein, UA 10/17/2018 Negative    • Leuk Esterase, UA 10/17/2018 Moderate (2+)*   • Nitrite, UA 10/17/2018 Negative    • Urobilinogen, UA 10/17/2018 1.0 E.U./dL    • HCG, Urine QL 10/17/2018 Negative    • WBC 10/17/2018 8.27    • RBC 10/17/2018 4.36    • Hemoglobin 10/17/2018 13.1    • Hematocrit 10/17/2018 39.6    • MCV 10/17/2018 90.8    • MCH 10/17/2018 30.0    • MCHC 10/17/2018 33.1    • RDW 10/17/2018 13.0    • RDW-SD 10/17/2018 42.9    • MPV 10/17/2018 9.3    • Platelets 10/17/2018 269    • Neutrophil % 10/17/2018 73.6*   • Lymphocyte % 10/17/2018 15.5*   • Monocyte % 10/17/2018 10.3*   • Eosinophil % 10/17/2018 0.1    • Basophil % 10/17/2018 0.4    • Immature Grans % 10/17/2018 0.1    • Neutrophils, Absolute 10/17/2018 6.09    • Lymphocytes, Absolute 10/17/2018 1.28    • Monocytes, Absolute 10/17/2018 0.85    • Eosinophils, Absolute 10/17/2018 0.01    • Basophils, Absolute 10/17/2018 0.03    • Immature Grans, Absolute 10/17/2018 0.01    • RBC, UA 10/17/2018 0-2    • WBC, UA 10/17/2018 21-30*   • Bacteria, UA 10/17/2018 1+*   • Squamous Epithelial Cell* 10/17/2018 13-20*   • Hyaline Casts, UA 10/17/2018 3-6    • Methodology 10/17/2018 Manual Light Microscopy    • Osmolality Calc 10/17/2018 273.6    Lab on 10/05/2018   Component Date Value   • Amphetamine Screen, Urine 10/05/2018 Negative    • Barbiturates Screen, Uri* 10/05/2018 Negative    • Benzodiazepine Screen, U* 10/05/2018 Negative    • Cocaine Screen, Urine 10/05/2018 Negative    • Methadone Screen, Urine 10/05/2018 Negative    • Opiate Screen 10/05/2018 Negative    • Phencyclidine (PCP), Uri* 10/05/2018 Negative    • THC, Screen, Urine 10/05/2018 Negative    • 6-ACETYL  MORPHINE 10/05/2018 Negative    • Buprenorphine, Screen, U* 10/05/2018 Negative    • Oxycodone Screen, Urine 10/05/2018 Negative    • ETHANOL URINE SCREEN 10/05/2018 Negative    Lab on 10/03/2018   Component Date Value   • Amphetamine Screen, Urine 10/03/2018 Negative    • Barbiturates Screen, Uri* 10/03/2018 Negative    • Benzodiazepine Screen, U* 10/03/2018 Negative    • Cocaine Screen, Urine 10/03/2018 Negative    • Methadone Screen, Urine 10/03/2018 Negative    • Opiate Screen 10/03/2018 Negative    • Phencyclidine (PCP), Uri* 10/03/2018 Negative    • THC, Screen, Urine 10/03/2018 Negative    • 6-ACETYL MORPHINE 10/03/2018 Negative    • Buprenorphine, Screen, U* 10/03/2018 Negative    • Oxycodone Screen, Urine 10/03/2018 Negative    • ETHANOL URINE SCREEN 10/03/2018 Negative    Lab on 09/28/2018   Component Date Value   • Amphetamine Screen, Urine 09/28/2018 Negative    • Barbiturates Screen, Uri* 09/28/2018 Negative    • Benzodiazepine Screen, U* 09/28/2018 Negative    • Cocaine Screen, Urine 09/28/2018 Negative    • Methadone Screen, Urine 09/28/2018 Negative    • Opiate Screen 09/28/2018 Negative    • Phencyclidine (PCP), Uri* 09/28/2018 Negative    • THC, Screen, Urine 09/28/2018 Negative    • 6-ACETYL MORPHINE 09/28/2018 Negative    • Buprenorphine, Screen, U* 09/28/2018 Negative    • Oxycodone Screen, Urine 09/28/2018 Negative    • ETHANOL URINE SCREEN 09/28/2018 Negative    There may be more visits with results that are not included.     Assessment/Plan   Diagnoses and all orders for this visit:    Major depressive disorder, recurrent episode, moderate (CMS/HCC)    Post traumatic stress disorder (PTSD)    Alcohol dependence in early, early partial, sustained full, or sustained partial remission (CMS/HCC)    Cigarette nicotine dependence without complication    Nightmares  -     prazosin (MINIPRESS) 2 MG capsule; Take 1-2 capsules by mouth Every Night.    Functionality: pt with some impairments in  important areas of daily functioning.  patient was educated to eat healthier diet choices and encouraged exercise.    Impression: Patient with worsening of depression the last couple of weeks. Nightmares improved with Prazosin.     Plan:  1) Continue Prazosin 2 mg 1-2 po QHS for nightmares.  2) Continue Abilify, Latuda, hydroxyzine and Lexapro as prescribed and managed by her Psychiatric Nurse Practitioner in [place]  3) Continue Phase II IOP  4) RTC in 4 weeks     No Follow-up on file.           Encounter Diagnoses   Name Primary?   • Major depressive disorder, recurrent episode, moderate (CMS/HCC) Yes   • Post traumatic stress disorder (PTSD)    • Alcohol dependence in early, early partial, sustained full, or sustained partial remission (CMS/HCC)    • Cigarette nicotine dependence without complication    • Nightmares        Current Outpatient Prescriptions   Medication Sig Dispense Refill   • ARIPiprazole (ABILIFY) 5 MG tablet TAKE ONE TABLET BY MOUTH EVERY DAY FOR MOOD  1   • escitalopram (LEXAPRO) 20 MG tablet TAKE 1   1 2 TABLETS BY MOUTH EVERY DAY FOR MOOD  2   • hydrOXYzine (ATARAX) 10 MG tablet Take 10 mg by mouth 3 (Three) Times a Day.  1   • levothyroxine (SYNTHROID, LEVOTHROID) 25 MCG tablet TAKE ONE TABLET BY MOUTH EVERY DAY FOR THYROID  5   • Lurasidone HCl (LATUDA) 20 MG tablet tablet Take 20 mg by mouth Daily.     • prazosin (MINIPRESS) 2 MG capsule Take 1-2 capsules by mouth Every Night. 60 capsule 1   • TAYTULLA 1-20 MG-MCG(24) capsule Take 1 capsule by mouth Daily.  4     Current Facility-Administered Medications   Medication Dose Route Frequency Provider Last Rate Last Dose   • Naltrexone (VIVITROL) IM suspension 380 mg  380 mg Intramuscular Q28 Days Carol Ramirez, APRN               Lab Results   Component Value Date    WBC 8.27 10/17/2018    HGB 13.1 10/17/2018    HCT 39.6 10/17/2018    MCV 90.8 10/17/2018     10/17/2018     Lab Results   Component Value Date    GLUCOSE 97  10/17/2018    BUN 7 10/17/2018    CREATININE 0.75 10/17/2018    EGFRIFNONA 97 10/17/2018    BCR 9.3 10/17/2018    CO2 22.7 (L) 10/17/2018    CALCIUM 9.4 10/17/2018    ALBUMIN 4.30 10/17/2018    LABIL2 1.3 (L) 09/27/2014    AST 28 10/17/2018    ALT 20 10/17/2018     Pain Management Panel     Pain Management Panel Latest Ref Rng & Units 11/7/2018 11/2/2018    AMPHETAMINES SCREEN, URINE Negative Negative Negative    BARBITURATES SCREEN Negative Negative Negative    BENZODIAZEPINE SCREEN, URINE Negative Negative Negative    BUPRENORPHINE Negative Negative Negative    COCAINE SCREEN, URINE Negative Negative Negative    METHADONE SCREEN, URINE Negative Negative Negative          Lab Results   Component Value Date     10/17/2018    BUN 7 10/17/2018    CREATININE 0.75 10/17/2018    TSH 3.230 07/31/2018    WBC 8.27 10/17/2018

## 2018-11-09 ENCOUNTER — LAB (OUTPATIENT)
Dept: LAB | Facility: HOSPITAL | Age: 23
End: 2018-11-09

## 2018-11-09 DIAGNOSIS — F10.20 ACUTE ALCOHOLISM (HCC): ICD-10-CM

## 2018-11-09 DIAGNOSIS — Z79.899 MEDICATION MANAGEMENT: ICD-10-CM

## 2018-11-09 DIAGNOSIS — F17.210 CIGARETTE SMOKER: ICD-10-CM

## 2018-11-09 DIAGNOSIS — Z79.899 LONG TERM USE OF DRUG: ICD-10-CM

## 2018-11-09 LAB
6-ACETYL MORPHINE: NEGATIVE
AMPHET+METHAMPHET UR QL: NEGATIVE
BARBITURATES UR QL SCN: NEGATIVE
BENZODIAZ UR QL SCN: NEGATIVE
BUPRENORPHINE SERPL-MCNC: NEGATIVE NG/ML
CANNABINOIDS SERPL QL: NEGATIVE
COCAINE UR QL: NEGATIVE
METHADONE UR QL SCN: NEGATIVE
OPIATES UR QL: NEGATIVE
OXYCODONE UR QL SCN: NEGATIVE
PCP UR QL SCN: NEGATIVE

## 2018-11-09 PROCEDURE — 80307 DRUG TEST PRSMV CHEM ANLYZR: CPT

## 2018-11-14 ENCOUNTER — LAB (OUTPATIENT)
Dept: LAB | Facility: HOSPITAL | Age: 23
End: 2018-11-14

## 2018-11-14 DIAGNOSIS — Z79.899 MEDICATION MANAGEMENT: ICD-10-CM

## 2018-11-14 DIAGNOSIS — F10.20 ACUTE ALCOHOLISM (HCC): ICD-10-CM

## 2018-11-14 DIAGNOSIS — Z79.899 LONG TERM USE OF DRUG: ICD-10-CM

## 2018-11-14 DIAGNOSIS — F17.210 CIGARETTE SMOKER: ICD-10-CM

## 2018-11-14 PROCEDURE — 80307 DRUG TEST PRSMV CHEM ANLYZR: CPT

## 2018-11-16 ENCOUNTER — APPOINTMENT (OUTPATIENT)
Dept: LAB | Facility: HOSPITAL | Age: 23
End: 2018-11-16

## 2018-11-21 ENCOUNTER — LAB (OUTPATIENT)
Dept: LAB | Facility: HOSPITAL | Age: 23
End: 2018-11-21

## 2018-11-21 DIAGNOSIS — Z79.899 MEDICATION MANAGEMENT: ICD-10-CM

## 2018-11-21 DIAGNOSIS — F17.210 CIGARETTE SMOKER: ICD-10-CM

## 2018-11-21 DIAGNOSIS — Z79.899 LONG TERM USE OF DRUG: ICD-10-CM

## 2018-11-21 DIAGNOSIS — F10.20 ACUTE ALCOHOLISM (HCC): ICD-10-CM

## 2018-11-21 PROCEDURE — 80307 DRUG TEST PRSMV CHEM ANLYZR: CPT

## 2018-11-28 ENCOUNTER — LAB (OUTPATIENT)
Dept: LAB | Facility: HOSPITAL | Age: 23
End: 2018-11-28

## 2018-11-28 DIAGNOSIS — Z79.899 MEDICATION MANAGEMENT: ICD-10-CM

## 2018-11-28 DIAGNOSIS — F10.20 ACUTE ALCOHOLISM (HCC): ICD-10-CM

## 2018-11-28 DIAGNOSIS — F17.210 CIGARETTE SMOKER: ICD-10-CM

## 2018-11-28 DIAGNOSIS — Z79.899 LONG TERM USE OF DRUG: ICD-10-CM

## 2018-11-28 PROCEDURE — 80307 DRUG TEST PRSMV CHEM ANLYZR: CPT

## 2018-11-30 ENCOUNTER — LAB (OUTPATIENT)
Dept: LAB | Facility: HOSPITAL | Age: 23
End: 2018-11-30

## 2018-11-30 DIAGNOSIS — Z79.899 MEDICATION MANAGEMENT: ICD-10-CM

## 2018-11-30 DIAGNOSIS — F17.210 CIGARETTE SMOKER: ICD-10-CM

## 2018-11-30 DIAGNOSIS — F10.20 ACUTE ALCOHOLISM (HCC): ICD-10-CM

## 2018-11-30 DIAGNOSIS — Z79.899 LONG TERM USE OF DRUG: ICD-10-CM

## 2018-11-30 PROCEDURE — 80307 DRUG TEST PRSMV CHEM ANLYZR: CPT

## 2018-12-05 ENCOUNTER — LAB (OUTPATIENT)
Dept: LAB | Facility: HOSPITAL | Age: 23
End: 2018-12-05

## 2018-12-05 DIAGNOSIS — F17.210 CIGARETTE SMOKER: ICD-10-CM

## 2018-12-05 DIAGNOSIS — Z79.899 MEDICATION MANAGEMENT: ICD-10-CM

## 2018-12-05 DIAGNOSIS — F10.20 ACUTE ALCOHOLISM (HCC): ICD-10-CM

## 2018-12-05 DIAGNOSIS — Z79.899 LONG TERM USE OF DRUG: ICD-10-CM

## 2018-12-05 PROCEDURE — 80307 DRUG TEST PRSMV CHEM ANLYZR: CPT

## 2018-12-07 ENCOUNTER — LAB (OUTPATIENT)
Dept: LAB | Facility: HOSPITAL | Age: 23
End: 2018-12-07

## 2018-12-07 DIAGNOSIS — F17.210 CIGARETTE SMOKER: ICD-10-CM

## 2018-12-07 DIAGNOSIS — Z79.899 LONG TERM USE OF DRUG: ICD-10-CM

## 2018-12-07 DIAGNOSIS — F10.20 ACUTE ALCOHOLISM (HCC): ICD-10-CM

## 2018-12-07 DIAGNOSIS — Z79.899 MEDICATION MANAGEMENT: ICD-10-CM

## 2018-12-07 PROCEDURE — 80307 DRUG TEST PRSMV CHEM ANLYZR: CPT

## 2018-12-11 ENCOUNTER — APPOINTMENT (OUTPATIENT)
Dept: PSYCHIATRY | Facility: CLINIC | Age: 23
End: 2018-12-11

## 2018-12-11 ENCOUNTER — LAB (OUTPATIENT)
Dept: LAB | Facility: HOSPITAL | Age: 23
End: 2018-12-11

## 2018-12-11 DIAGNOSIS — Z79.899 MEDICATION MANAGEMENT: ICD-10-CM

## 2018-12-11 DIAGNOSIS — Z79.899 LONG TERM USE OF DRUG: ICD-10-CM

## 2018-12-11 DIAGNOSIS — F17.210 CIGARETTE SMOKER: ICD-10-CM

## 2018-12-11 DIAGNOSIS — F43.10 POST TRAUMATIC STRESS DISORDER (PTSD): ICD-10-CM

## 2018-12-11 DIAGNOSIS — F10.21 ALCOHOL DEPENDENCE IN EARLY FULL REMISSION (HCC): Primary | ICD-10-CM

## 2018-12-11 DIAGNOSIS — F33.41 RECURRENT MAJOR DEPRESSION IN PARTIAL REMISSION (HCC): ICD-10-CM

## 2018-12-11 DIAGNOSIS — F10.20 ACUTE ALCOHOLISM (HCC): ICD-10-CM

## 2018-12-11 PROCEDURE — 80307 DRUG TEST PRSMV CHEM ANLYZR: CPT

## 2018-12-11 PROCEDURE — 90853 GROUP PSYCHOTHERAPY: CPT | Performed by: SOCIAL WORKER

## 2018-12-18 ENCOUNTER — APPOINTMENT (OUTPATIENT)
Dept: PSYCHIATRY | Facility: CLINIC | Age: 23
End: 2018-12-18

## 2018-12-18 ENCOUNTER — LAB (OUTPATIENT)
Dept: LAB | Facility: HOSPITAL | Age: 23
End: 2018-12-18

## 2018-12-18 DIAGNOSIS — F17.210 CIGARETTE SMOKER: ICD-10-CM

## 2018-12-18 DIAGNOSIS — F10.21 ALCOHOL DEPENDENCE IN EARLY, EARLY PARTIAL, SUSTAINED FULL, OR SUSTAINED PARTIAL REMISSION (HCC): Primary | ICD-10-CM

## 2018-12-18 DIAGNOSIS — F43.10 POST TRAUMATIC STRESS DISORDER (PTSD): ICD-10-CM

## 2018-12-18 DIAGNOSIS — Z79.899 LONG TERM USE OF DRUG: ICD-10-CM

## 2018-12-18 DIAGNOSIS — F10.20 ACUTE ALCOHOLISM (HCC): ICD-10-CM

## 2018-12-18 DIAGNOSIS — F33.41 RECURRENT MAJOR DEPRESSION IN PARTIAL REMISSION (HCC): ICD-10-CM

## 2018-12-18 DIAGNOSIS — Z79.899 MEDICATION MANAGEMENT: ICD-10-CM

## 2018-12-18 PROCEDURE — 80307 DRUG TEST PRSMV CHEM ANLYZR: CPT

## 2018-12-18 PROCEDURE — 90853 GROUP PSYCHOTHERAPY: CPT | Performed by: SOCIAL WORKER

## 2018-12-18 NOTE — GROUP NOTE
December 11  10:00 AM  11:00 AM  SUBSTANCE ABUSE  Date: December 11, 2018  Time in: 10:00 AM  Time out: 11:00 AM    IOP PHASE III GROUP NOTE    DATA:    1 hour group therapy session (Topics Addressed)  Group focused on identifying social and cultural factors that must be change in order to develop an environment conducive to long-term recovery.    CLINICAL MANUVERING/INTERVENTIONS: Facilitated discussion among group members whereby they identified and incidences of social dependence which was developed throughout their active addiction.  Members also shared changes they have made in her life since beginning recovery and her future plans.  Also facilitated discussion members whereby they shared ideas for being able to find jocelyn in life in a healthy way.     Response: Patient arrived group on time and participated appropriately.    ASSESSMENT:  Engaged in activity/Process and self-disclosed: Yes or No  Affect ([place] all that apply) appropriate, blunted, flat, sad, angry, tearful, anxious, bright  Applies topic to self: Yes or No  Able to give and receive feedback: Yes or No  Degree of insightful thinking: Least 1  2  3  4  5  6  7 8  9  10  Most      Urinalysis: Negative     12-Step attendance: Frequency/Sponsor? 4 meetings and regular contact with sponsor     Identification of environmental and personal barriers to recovery: Legal issues     Motivation for treatment: Legal, family, health        Assessment      Diagnoses and all orders for this visit:     Alcohol dependence in early, early partial, sustained full, or sustained partial remission (CMS/HCC)     Recurrent major depression in partial remission (CMS/HCC)     Post traumatic stress disorder (PTSD)              PLAN:   Patient will continue in weekly group psychotherapy sessions and individual outpatient psychotherapy sessions every 3-4 weeks and will continue in self-help meetings and seek additional treatment if necessary following completion.     Gordon  Cali, [facility], [facility]

## 2018-12-19 NOTE — GROUP NOTE
December 18  10:00 AM  11:00 AM  SUBSTANCE ABUSE  Date: December 18, 2018  Time in: 10:00 AM  Time out: 11:00 AM    [facility] PHASE III GROUP NOTE    DATA:    1 hour group therapy session (Topics Addressed)  Group focused on the fact members are approaching their completion date and the importance of recovery planning.    CLINICAL MANUVERING/INTERVENTIONS:  Facilitated discussion whereby group members were able to identify and discuss appropriate changes they have made in their life.  Also facilitated discussion concerning the importance of continuing to build sober support network and remaining active and recovery.  Also discussed the likelihood members will experience thoughts of using soon after completing the program and the fallacy of believing they can maintain their recovery through willpower.    Response: Patient arrived group on time and participated appropriately.     ASSESSMENT:  Engaged in activity/Process and self-disclosed: Yes or No  Affect ([place] all that apply) appropriate, blunted, flat, sad, angry, tearful, anxious, bright  Applies topic to self: Yes or No  Able to give and receive feedback: Yes or No  Degree of insightful thinking: Least 1  2  3  4  5  6  7 8  9  10  Most        Urinalysis: Negative     12-Step attendance: Frequency/Sponsor? 4 meetings and regular contact with sponsor     Identification of environmental and personal barriers to recovery: Legal issues     Motivation for treatment: Legal, family, health        Assessment      Diagnoses and all orders for this visit:     Alcohol dependence in early  remission (CMS/HCC)     Recurrent major depression in partial remission (CMS/HCC)     Post traumatic stress disorder (PTSD)              PLAN:   Patient will continue in weekly group psychotherapy sessions and individual outpatient psychotherapy sessions every 3-4 weeks and will continue in self-help meetings and seek additional treatment if necessary following completion.     Gordon Leiva,  LCSW, LCADC

## 2018-12-26 ENCOUNTER — TELEPHONE (OUTPATIENT)
Dept: PSYCHIATRY | Facility: CLINIC | Age: 23
End: 2018-12-26

## 2018-12-26 NOTE — TELEPHONE ENCOUNTER
Patient called stating someone contacted her stating [facility] would be covering Phase III today but she is going to be unable to attend due to she is feeling really sick.

## 2019-01-02 ENCOUNTER — LAB (OUTPATIENT)
Dept: LAB | Facility: HOSPITAL | Age: 24
End: 2019-01-02

## 2019-01-02 DIAGNOSIS — F10.20 ACUTE ALCOHOLISM (HCC): ICD-10-CM

## 2019-01-02 DIAGNOSIS — Z79.899 MEDICATION MANAGEMENT: ICD-10-CM

## 2019-01-02 DIAGNOSIS — Z79.899 LONG TERM USE OF DRUG: ICD-10-CM

## 2019-01-02 DIAGNOSIS — F17.210 CIGARETTE SMOKER: ICD-10-CM

## 2019-01-02 PROCEDURE — 80307 DRUG TEST PRSMV CHEM ANLYZR: CPT

## 2019-01-08 ENCOUNTER — OFFICE VISIT (OUTPATIENT)
Dept: PSYCHIATRY | Facility: CLINIC | Age: 24
End: 2019-01-08

## 2019-01-08 ENCOUNTER — LAB (OUTPATIENT)
Dept: LAB | Facility: HOSPITAL | Age: 24
End: 2019-01-08

## 2019-01-08 DIAGNOSIS — Z79.899 MEDICATION MANAGEMENT: ICD-10-CM

## 2019-01-08 DIAGNOSIS — F10.20 ACUTE ALCOHOLISM (HCC): ICD-10-CM

## 2019-01-08 DIAGNOSIS — F43.10 POST TRAUMATIC STRESS DISORDER (PTSD): ICD-10-CM

## 2019-01-08 DIAGNOSIS — F10.21 ALCOHOL DEPENDENCE IN EARLY, EARLY PARTIAL, SUSTAINED FULL, OR SUSTAINED PARTIAL REMISSION (HCC): Primary | ICD-10-CM

## 2019-01-08 DIAGNOSIS — F33.41 RECURRENT MAJOR DEPRESSIVE DISORDER IN PARTIAL REMISSION (HCC): ICD-10-CM

## 2019-01-08 DIAGNOSIS — Z79.899 LONG TERM USE OF DRUG: ICD-10-CM

## 2019-01-08 DIAGNOSIS — F17.210 CIGARETTE SMOKER: ICD-10-CM

## 2019-01-08 PROCEDURE — 80307 DRUG TEST PRSMV CHEM ANLYZR: CPT

## 2019-01-08 PROCEDURE — 90837 PSYTX W PT 60 MINUTES: CPT | Performed by: SOCIAL WORKER

## 2019-01-09 NOTE — PROGRESS NOTES
Date of Service: January 8, 2019  Time In: 10:00 AM  Time Out: 11:00 AM      PROGRESS NOTE  Data:  Veronica Moore is a 23 y.o. female who met 1:1 with the undersigned for a regularly scheduled individual outpatient therapy session at the [facility] for follow-up of alcohol dependence, depression, and PTSD.  The patient was originally scheduled to participate in group.  However, she was the only one who arrived someone individual was conducted.     HPI: Patient reports she feels she is doing relatively well with depression but states she has periods of time where she feels very sad about her circumstances and the problems she has caused in her life.  Patient reports she continues to maintain sobriety but states she continues to have periods of cravings and thoughts of use.  Patient rates current triggers at a 3 on a scale of 1-10 with 10 being most severe.  Patient does not discuss PTSD in detail but states she doesn't feel it is a significant issue at this time.  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  Patient adamantly and convincingly denies suicidal ideation and vehemently denies any current substance use.      Clinical Maneuvering/Intervention:  Assisted patient in processing above session content; acknowledged and normalized patient’s thoughts, feelings, and concerns.  Praised the patient for her effort and willingness to continue to seek recovery.  Also discussed the salt-trigger-relapse cycle and assisted the patient in developing strategy to mitigate relapse triggers.  Also discussed the importance of sober support network and strongly encourage the patient to continue to attend regular self-help meetings and seek a sponsor.  Also discussed people, places, and things and encourage the patient to be aware of developing an environment conducive to ongoing  recovery.  Also assisted the patient in processing her feelings of guilt for the issue she has caused in her life and encouraged her to remind herself she cannot change the past and can only work on the present in the future.  Provided unconditional positive regard in a safe, supportive environment.    Allowed patient to freely discuss issues without interruption or judgment. Provided safe, confidential environment to facilitate the development of positive therapeutic relationship and encourage open, honest communication. Assisted patient in identifying risk factors which would indicate the need for higher level of care including thoughts to harm self or others and/or self-harming behavior and encouraged patient to contact this office, call 911, or present to the nearest emergency room should any of these events occur. Discussed crisis intervention services and means to access.  Patient adamantly and convincingly denies current suicidal or homicidal ideation or perceptual disturbance.    Assessment     Diagnoses and all orders for this visit:    Alcohol dependence in early remission (CMS/HCC)    Recurrent major depressive disorder in partial remission (CMS/HCC)    Post traumatic stress disorder (PTSD)               Mental Status Exam  Hygiene:  good  Dress:  casual  Attitude:  Cooperative  Motor Activity:  Appropriate  Speech:  Normal  Mood:  within normal limits  Affect:  calm and pleasant  Thought Processes:  Linear  Thought Content:  normal  Suicidal Thoughts:  denies  Homicidal Thoughts:  denies  Crisis Safety Plan: yes, to come to the emergency room.  Hallucinations:  denies    Patient's Support Network Includes:  parents and extended family    Progress toward goal: Not at goal    Functional Status: Moderate impairment     Prognosis: Fair with Ongoing Treatment       Plan         Patient will continue in the intensive outpatient program and follow all recommendations and guidelines.  Patient will adhere to  medication regimen as prescribed and report any side effects. Patient will contact this office, call 911 or present to the nearest emergency room should suicidal or homicidal ideations occur. Provide Cognitive Behavioral Therapy and Integrative Therapy to improve functioning, maintain stability, and avoid decompensation and the need for higher level of care.          No Follow-up on file.      This document signed by Gordon Leiva LCSW, JONATHAN January 9, 2019 8:47 AM

## 2019-01-15 ENCOUNTER — LAB (OUTPATIENT)
Dept: LAB | Facility: HOSPITAL | Age: 24
End: 2019-01-15

## 2019-01-15 DIAGNOSIS — Z79.899 MEDICATION MANAGEMENT: ICD-10-CM

## 2019-01-15 DIAGNOSIS — F10.20 ACUTE ALCOHOLISM (HCC): ICD-10-CM

## 2019-01-15 DIAGNOSIS — F17.210 CIGARETTE SMOKER: ICD-10-CM

## 2019-01-15 DIAGNOSIS — Z79.899 LONG TERM USE OF DRUG: ICD-10-CM

## 2019-01-15 PROCEDURE — 80307 DRUG TEST PRSMV CHEM ANLYZR: CPT

## 2019-01-22 ENCOUNTER — TREATMENT (OUTPATIENT)
Dept: PSYCHIATRY | Facility: HOSPITAL | Age: 24
End: 2019-01-22

## 2019-01-22 ENCOUNTER — LAB (OUTPATIENT)
Dept: LAB | Facility: HOSPITAL | Age: 24
End: 2019-01-22

## 2019-01-22 DIAGNOSIS — F10.20 ACUTE ALCOHOLISM (HCC): ICD-10-CM

## 2019-01-22 DIAGNOSIS — Z79.899 MEDICATION MANAGEMENT: ICD-10-CM

## 2019-01-22 DIAGNOSIS — Z79.899 LONG TERM USE OF DRUG: ICD-10-CM

## 2019-01-22 DIAGNOSIS — F17.210 CIGARETTE SMOKER: ICD-10-CM

## 2019-01-22 DIAGNOSIS — F10.21 ALCOHOL DEPENDENCE IN REMISSION (HCC): ICD-10-CM

## 2019-01-22 PROCEDURE — 80307 DRUG TEST PRSMV CHEM ANLYZR: CPT

## 2019-01-22 NOTE — PROGRESS NOTES
Date of Service: January 22, 2019  Time In: 10:15am  Time Out: 11:05am      PROGRESS NOTE  Data:  Veronica Moore is a 23 y.o. female who met with this licensed therapist for an individual therapy session.  She was scheduled for group therapy but was the only patient in attendance today.  She was receptive to individual therapy with a focus on relapse prevention and establishing a plan for discharge from [facility] phase III and develop a transition plan including aftercare.        Clinical Maneuvering/Intervention:  Therapy session consisted of initial rapport building following my introduction as a licensed therapist covering for ROCKY Christianson today.  We then reviewed her experiences with treatment starting at the [facility] phase I and up to her current status of being in the final phase of [facility] which is level III.  This patient has done very well and now has over 6 months of sobriety. She is active in AA and has a female only home group that she reports to be very helpful.  She has been able manage the typical social and emotional risk factors and has stayed committed to sobriety.  She does have legal issues that have not been resolved but she is facing up to those and does not appear to be running from problems at this time.   We agreed that the treatment team would review her status and consider her for graduating phase III next week.  She wants to continue individual therapy in addition to her support group and we went ahead and scheduled her a follow up with Gordon Leiva LCSW [facility].        Assisted patient in processing above session content; acknowledged and normalized patient’s thoughts, feelings, and concerns.     Allowed patient to freely discuss issues without interruption or judgment. Provided safe, confidential environment to facilitate the development of positive therapeutic relationship and encourage open, honest communication. Assisted patient in identifying risk factors which would indicate the  need for higher level of care including thoughts to harm self or others and/or self-harming behavior and encouraged patient to contact this office, call 911, or present to the nearest emergency room should any of these events occur. Discussed crisis intervention services and means to access.  Patient adamantly and convincingly denies current suicidal or homicidal ideation or perceptual disturbance.    Assessment : Making very good progress and looks as if she could transition to outpatient therapy in conjuction with 12 step recovery groups.     Diagnosis: Alcohol Dependence             Patient's Support Network Includes:  Significant other, family    Progress toward goal: Making very good progress and has maintained her sobriety for over 6 months.      Prognosis: Good with continued treatment and support.       Plan   Patient will return for group session next Tuesday at 10am.  Treatment team will meet to discuss her status in terms of graduating from phase III.  We went ahead and scheduled her with CALVIN Siegel for outpatient therapy with his next available at the clinic in [place], [place].       Patient will adhere to medication regimen as prescribed and report any side effects. Patient will contact this office, call 911 or present to the nearest emergency room should suicidal or homicidal ideations occur. Provide Cognitive Behavioral Therapy and Integrative Therapy to improve functioning, maintain stability, and avoid decompensation and the need for higher level of care.                This document signed by Stepan Hernandez LCSW, JONATHAN January 22, 2019 12:09 PM

## 2019-01-30 ENCOUNTER — LAB (OUTPATIENT)
Dept: LAB | Facility: HOSPITAL | Age: 24
End: 2019-01-30

## 2019-01-30 DIAGNOSIS — Z79.899 MEDICATION MANAGEMENT: ICD-10-CM

## 2019-01-30 DIAGNOSIS — F17.210 CIGARETTE SMOKER: ICD-10-CM

## 2019-01-30 DIAGNOSIS — F10.20 ACUTE ALCOHOLISM (HCC): ICD-10-CM

## 2019-01-30 DIAGNOSIS — Z79.899 LONG TERM USE OF DRUG: ICD-10-CM

## 2019-01-30 PROCEDURE — 80307 DRUG TEST PRSMV CHEM ANLYZR: CPT

## 2019-03-11 ENCOUNTER — OFFICE VISIT (OUTPATIENT)
Dept: PSYCHIATRY | Facility: CLINIC | Age: 24
End: 2019-03-11

## 2019-03-11 DIAGNOSIS — F10.21 ALCOHOL DEPENDENCE IN EARLY FULL REMISSION (HCC): Primary | ICD-10-CM

## 2019-03-11 DIAGNOSIS — F33.41 MAJOR DEPRESSIVE DISORDER, RECURRENT, IN PARTIAL REMISSION (HCC): ICD-10-CM

## 2019-03-11 DIAGNOSIS — F43.10 POST TRAUMATIC STRESS DISORDER (PTSD): ICD-10-CM

## 2019-03-11 PROCEDURE — 90837 PSYTX W PT 60 MINUTES: CPT | Performed by: SOCIAL WORKER

## 2019-03-11 RX ORDER — BUSPIRONE HYDROCHLORIDE 10 MG/1
20 TABLET ORAL DAILY
COMMUNITY
End: 2021-03-08 | Stop reason: ALTCHOICE

## 2019-03-11 RX ORDER — LURASIDONE HYDROCHLORIDE 40 MG/1
40 TABLET, FILM COATED ORAL DAILY
Status: ON HOLD | COMMUNITY
End: 2019-09-24

## 2019-03-11 RX ORDER — ESCITALOPRAM OXALATE 20 MG/1
30 TABLET ORAL DAILY
COMMUNITY
End: 2021-03-08 | Stop reason: ALTCHOICE

## 2019-03-11 NOTE — PROGRESS NOTES
Date of Service: March 11, 2019  Time In: 12:50 PM  Time Out: 1:45 PM      PROGRESS NOTE  Data:  Veronica Moore is a 23 y.o. female who met 1:1 with the undersigned for a regularly scheduled individual outpatient therapy session at his primary care of [place] for follow-up of alcohol dependence, depression, and PTSD.       HPI: She reports she feels she has been doing relatively well since completing the intensive outpatient program.  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  Patient also reports she is approximately 11 weeks pregnant and states she is very excited about becoming a mother.  Patient reports she feels she is doing well concerning her depression and reports symptoms are not significant at this time.  Patient rates current symptoms of depression agitated on a scale of 1-10 with 10 being most severe.  Patient reports she feels she is doing very well with her sobriety and reports no significant cravings or thoughts of use at this time.  Patient rates current triggers at a 2 on a scale of 1-10 with 10 being most severe.  Patient continues to report symptoms of PTSD are not significant at this time. Patient adamantly and convincingly denies suicidal ideation and vehemently denies any current substance use.  Patient reports she continues to see a private psychiatrist and continues to adhere to medication regimen as prescribed.  Patient reported medications updated on this date.      Clinical Maneuvering/Intervention:  Assisted patient in processing above session content; acknowledged and normalized patient’s thoughts, feelings, and concerns.  Allowed the patient to discuss her excitement of being pregnant and validated her  feelings.  Also discussed the potential for mood instability this could cause and discussed the importance of maintaining a sober support network and strongly urged the patient to attend self-help meetings on a regular basis.  Continue to assist the patient and identifying potential relapse situations and developing strategies to mitigate.  Provided unconditional positive regard in a safe, supportive environment.    Allowed patient to freely discuss issues without interruption or judgment. Provided safe, confidential environment to facilitate the development of positive therapeutic relationship and encourage open, honest communication. Assisted patient in identifying risk factors which would indicate the need for higher level of care including thoughts to harm self or others and/or self-harming behavior and encouraged patient to contact this office, call 911, or present to the nearest emergency room should any of these events occur. Discussed crisis intervention services and means to access.  Patient adamantly and convincingly denies current suicidal or homicidal ideation or perceptual disturbance.    Assessment     Diagnoses and all orders for this visit:    Alcohol dependence in early full remission (CMS/HCC)    Major depressive disorder, recurrent, in partial remission (CMS/HCC)    Post traumatic stress disorder (PTSD)               Mental Status Exam  Hygiene:  good  Dress:  casual  Attitude:  Cooperative  Motor Activity:  Appropriate  Speech:  Normal  Mood:  within normal limits  Affect:  calm and pleasant  Thought Processes:  Linear  Thought Content:  normal  Suicidal Thoughts:  denies  Homicidal Thoughts:  denies  Crisis Safety Plan: yes, to come to the emergency room.  Hallucinations:  denies    Patient's Support Network Includes:  parents and extended family    Progress toward goal: Not at goal    Functional Status: Moderate impairment     Prognosis: Fair with Ongoing Treatment       Plan         Patient  will continue in individual outpatient therapy session at [facility] in approximately 3-4 weeks.  Patient will continue with her primary care provider and follow all recommendations including adhering to medication regimen as prescribed.  Patient will continue to follow up with prenatal care and follow all recommendations.  Patient will adhere to medication regimen as prescribed and report any side effects. Patient will contact this office, call 911 or present to the nearest emergency room should suicidal or homicidal ideations occur. Provide Cognitive Behavioral Therapy and Integrative Therapy to improve functioning, maintain stability, and avoid decompensation and the need for higher level of care.          Return in about 4 weeks (around 4/8/2019) for Next scheduled follow up.      This document signed by Gordon Leiva LCSW, JONATHAN March 11, 2019 2:38 PM

## 2019-04-15 ENCOUNTER — OFFICE VISIT (OUTPATIENT)
Dept: PSYCHIATRY | Facility: CLINIC | Age: 24
End: 2019-04-15

## 2019-04-15 DIAGNOSIS — F10.21 ALCOHOL DEPENDENCE IN SUSTAINED FULL REMISSION (HCC): Primary | ICD-10-CM

## 2019-04-15 DIAGNOSIS — F43.10 POST TRAUMATIC STRESS DISORDER (PTSD): ICD-10-CM

## 2019-04-15 DIAGNOSIS — F33.40 MAJOR DEPRESSIVE DISORDER, RECURRENT, IN REMISSION (HCC): ICD-10-CM

## 2019-04-15 PROCEDURE — 90834 PSYTX W PT 45 MINUTES: CPT | Performed by: SOCIAL WORKER

## 2019-04-15 NOTE — PROGRESS NOTES
Date of Service: April 15, 2019  Time In: 10:00 AM  Time Out:  10:45 AM      PROGRESS NOTE  Data:  Veronica Moore is a 23 y.o. female who met 1:1 with the undersigned for a regularly scheduled individual outpatient therapy session at his primary care of [place] for follow-up of alcohol dependence, depression, and PTSD.       HPI: Patient reports she feels she is doing relatively well and states she has almost 300 days clean and sober.  Patient denies any cravings or thoughts of use at this time.  She reports she has not been attending self-help meetings on a regular basis due to her schedule and the lack of availability of meetings in [facility].  Patient reports she is approximately 16 weeks pregnant and states she feels as though things are going relatively well.  Patient reports she feels she is doing well concerning her depression and reports symptoms are not significant at this time.  Patient rates current symptoms of depression at a 1 on a scale of 1-10 with 10 being most severe.    Patient rates current triggers at a 2 on a scale of 1-10 with 10 being most severe.  Patient continues to report symptoms of PTSD are not significant at this time. Patient adamantly and convincingly denies suicidal ideation and vehemently denies any current substance use.  Patient reports she continues to see a private psychiatrist and continues to adhere to medication regimen as prescribed.  Patient reported medications updated on this date.    Patient does report she continues to have upcoming court dates and states she has been frustrated with the process.  She states she continues to be hopeful her charges will be amended down to a misdemeanor.      Clinical Maneuvering/Intervention:  Assisted patient in processing above session content; acknowledged and normalized patient’s thoughts, feelings, and concerns.  Utilized motivational interviewing techniques including complex reflections to assist the patient and  acknowledging her progress and praised her efforts.  However, also pointed out the fact the patient has stopped attending self-help meetings and encouraged her to find a way to become more active in her recovery.  Also encouraged the patient to continue to seek prenatal care and to focus on healthy skills of daily living.  Provided unconditional positive regard in a safe, supportive environment.    Allowed patient to freely discuss issues without interruption or judgment. Provided safe, confidential environment to facilitate the development of positive therapeutic relationship and encourage open, honest communication. Assisted patient in identifying risk factors which would indicate the need for higher level of care including thoughts to harm self or others and/or self-harming behavior and encouraged patient to contact this office, call 911, or present to the nearest emergency room should any of these events occur. Discussed crisis intervention services and means to access.  Patient adamantly and convincingly denies current suicidal or homicidal ideation or perceptual disturbance.    Assessment    Patient appears to be doing relatively well at this time.  However, the patient continues to be in relatively early recovery and could be at increased risk for relapse without ongoing treatment.    Diagnoses and all orders for this visit:    Alcohol dependence in sustained full remission (CMS/[facility])    Post traumatic stress disorder (PTSD)    Major depressive disorder, recurrent, in remission (CMS/HCC)               Mental Status Exam  Hygiene:  good  Dress:  casual  Attitude:  Cooperative  Motor Activity:  Appropriate  Speech:  Normal  Mood:  within normal limits  Affect:  calm and pleasant  Thought Processes:  Linear  Thought Content:  normal  Suicidal Thoughts:  denies  Homicidal Thoughts:  denies  Crisis Safety Plan: yes, to come to the emergency room.  Hallucinations:  denies    Patient's Support Network Includes:   parents and extended family    Progress toward goal: Not at goal    Functional Status: Moderate impairment     Prognosis: Fair with Ongoing Treatment       Plan         Patient will continue in individual outpatient therapy session at [facility] in approximately 4 weeks.  Patient will continue with her primary care provider and follow all recommendations including adhering to medication regimen as prescribed.  Patient will continue to follow up with prenatal care and follow all recommendations.  Patient will adhere to medication regimen as prescribed and report any side effects. Patient will contact this office, call 911 or present to the nearest emergency room should suicidal or homicidal ideations occur. Provide Cognitive Behavioral Therapy and Integrative Therapy to improve functioning, maintain stability, and avoid decompensation and the need for higher level of care.          Return in about 4 weeks (around 5/13/2019) for Next scheduled follow up.      This document signed by Gordon Leiva LCSW, JONATHAN April 15, 2019 4:12 PM

## 2019-09-23 ENCOUNTER — HOSPITAL ENCOUNTER (OUTPATIENT)
Dept: LABOR AND DELIVERY | Facility: HOSPITAL | Age: 24
Discharge: HOME | End: 2019-09-23

## 2019-09-23 ENCOUNTER — HOSPITAL ENCOUNTER (INPATIENT)
Facility: HOSPITAL | Age: 24
LOS: 1 days | Discharge: HOME | End: 2019-09-24
Attending: OBSTETRICS & GYNECOLOGY | Admitting: OBSTETRICS & GYNECOLOGY

## 2019-09-23 PROBLEM — Z34.90 PREGNANCY: Status: ACTIVE | Noted: 2019-09-23

## 2019-09-23 LAB
ABO GROUP BLD: NORMAL
BASOPHILS # BLD AUTO: 0.02 10*3/MM3 (ref 0–0.2)
BASOPHILS NFR BLD AUTO: 0.2 % (ref 0–1.5)
BLD GP AB SCN SERPL QL: NEGATIVE
DEPRECATED RDW RBC AUTO: 46.9 FL (ref 37–54)
EOSINOPHIL # BLD AUTO: 0.07 10*3/MM3 (ref 0–0.4)
EOSINOPHIL NFR BLD AUTO: 0.6 % (ref 0.3–6.2)
ERYTHROCYTE [DISTWIDTH] IN BLOOD BY AUTOMATED COUNT: 14.3 % (ref 12.3–15.4)
HCT VFR BLD AUTO: 36.4 % (ref 34–46.6)
HGB BLD-MCNC: 11.9 G/DL (ref 12–15.9)
IMM GRANULOCYTES # BLD AUTO: 0.05 10*3/MM3 (ref 0–0.05)
IMM GRANULOCYTES NFR BLD AUTO: 0.4 % (ref 0–0.5)
LYMPHOCYTES # BLD AUTO: 2.64 10*3/MM3 (ref 0.7–3.1)
LYMPHOCYTES NFR BLD AUTO: 21 % (ref 19.6–45.3)
MCH RBC QN AUTO: 30.1 PG (ref 26.6–33)
MCHC RBC AUTO-ENTMCNC: 32.7 G/DL (ref 31.5–35.7)
MCV RBC AUTO: 91.9 FL (ref 79–97)
MONOCYTES # BLD AUTO: 1.17 10*3/MM3 (ref 0.1–0.9)
MONOCYTES NFR BLD AUTO: 9.3 % (ref 5–12)
NEUTROPHILS # BLD AUTO: 8.62 10*3/MM3 (ref 1.7–7)
NEUTROPHILS NFR BLD AUTO: 68.5 % (ref 42.7–76)
PLATELET # BLD AUTO: 248 10*3/MM3 (ref 140–450)
PMV BLD AUTO: 9.7 FL (ref 6–12)
RBC # BLD AUTO: 3.96 10*6/MM3 (ref 3.77–5.28)
RH BLD: POSITIVE
T&S EXPIRATION DATE: NORMAL
WBC NRBC COR # BLD: 12.57 10*3/MM3 (ref 3.4–10.8)

## 2019-09-23 PROCEDURE — 86900 BLOOD TYPING SEROLOGIC ABO: CPT | Performed by: OBSTETRICS & GYNECOLOGY

## 2019-09-23 PROCEDURE — 86850 RBC ANTIBODY SCREEN: CPT | Performed by: OBSTETRICS & GYNECOLOGY

## 2019-09-23 PROCEDURE — 25010000002 TERBUTALINE PER 1 MG: Performed by: OBSTETRICS & GYNECOLOGY

## 2019-09-23 PROCEDURE — 3E033VJ INTRODUCTION OF OTHER HORMONE INTO PERIPHERAL VEIN, PERCUTANEOUS APPROACH: ICD-10-PCS | Performed by: OBSTETRICS & GYNECOLOGY

## 2019-09-23 PROCEDURE — 85025 COMPLETE CBC W/AUTO DIFF WBC: CPT | Performed by: OBSTETRICS & GYNECOLOGY

## 2019-09-23 PROCEDURE — 86901 BLOOD TYPING SEROLOGIC RH(D): CPT | Performed by: OBSTETRICS & GYNECOLOGY

## 2019-09-23 PROCEDURE — 59025 FETAL NON-STRESS TEST: CPT

## 2019-09-23 PROCEDURE — 3E0P7VZ INTRODUCTION OF HORMONE INTO FEMALE REPRODUCTIVE, VIA NATURAL OR ARTIFICIAL OPENING: ICD-10-PCS | Performed by: OBSTETRICS & GYNECOLOGY

## 2019-09-23 RX ORDER — OXYTOCIN-SODIUM CHLORIDE 0.9% IV SOLN 30 UNIT/500ML 30-0.9/5 UT/ML-%
250 SOLUTION INTRAVENOUS CONTINUOUS
Status: CANCELLED | OUTPATIENT
Start: 2019-09-23 | End: 2019-09-23

## 2019-09-23 RX ORDER — BUTORPHANOL TARTRATE 1 MG/ML
1 INJECTION, SOLUTION INTRAMUSCULAR; INTRAVENOUS
Status: DISCONTINUED | OUTPATIENT
Start: 2019-09-23 | End: 2019-09-24 | Stop reason: HOSPADM

## 2019-09-23 RX ORDER — MISOPROSTOL 100 UG/1
25 TABLET ORAL EVERY 4 HOURS
Status: DISCONTINUED | OUTPATIENT
Start: 2019-09-23 | End: 2019-09-24

## 2019-09-23 RX ORDER — HYDROCODONE BITARTRATE AND ACETAMINOPHEN 7.5; 325 MG/1; MG/1
1 TABLET ORAL EVERY 4 HOURS PRN
Status: CANCELLED | OUTPATIENT
Start: 2019-09-23 | End: 2019-10-03

## 2019-09-23 RX ORDER — IBUPROFEN 800 MG/1
800 TABLET ORAL EVERY 8 HOURS SCHEDULED
Status: CANCELLED | OUTPATIENT
Start: 2019-09-23

## 2019-09-23 RX ORDER — MAGNESIUM HYDROXIDE 1200 MG/15ML
1000 LIQUID ORAL ONCE AS NEEDED
Status: DISCONTINUED | OUTPATIENT
Start: 2019-09-23 | End: 2019-09-24 | Stop reason: HOSPADM

## 2019-09-23 RX ORDER — SODIUM CHLORIDE 0.9 % (FLUSH) 0.9 %
3-10 SYRINGE (ML) INJECTION AS NEEDED
Status: DISCONTINUED | OUTPATIENT
Start: 2019-09-23 | End: 2019-09-24 | Stop reason: HOSPADM

## 2019-09-23 RX ORDER — TERBUTALINE SULFATE 1 MG/ML
0.25 INJECTION, SOLUTION SUBCUTANEOUS AS NEEDED
Status: DISCONTINUED | OUTPATIENT
Start: 2019-09-23 | End: 2019-09-24 | Stop reason: HOSPADM

## 2019-09-23 RX ORDER — SODIUM CHLORIDE, SODIUM LACTATE, POTASSIUM CHLORIDE, CALCIUM CHLORIDE 600; 310; 30; 20 MG/100ML; MG/100ML; MG/100ML; MG/100ML
125 INJECTION, SOLUTION INTRAVENOUS CONTINUOUS
Status: DISCONTINUED | OUTPATIENT
Start: 2019-09-23 | End: 2019-09-24

## 2019-09-23 RX ORDER — CARBOPROST TROMETHAMINE 250 UG/ML
250 INJECTION, SOLUTION INTRAMUSCULAR AS NEEDED
Status: CANCELLED | OUTPATIENT
Start: 2019-09-23

## 2019-09-23 RX ORDER — MISOPROSTOL 100 UG/1
600 TABLET ORAL AS NEEDED
Status: CANCELLED | OUTPATIENT
Start: 2019-09-23

## 2019-09-23 RX ORDER — OXYTOCIN-SODIUM CHLORIDE 0.9% IV SOLN 30 UNIT/500ML 30-0.9/5 UT/ML-%
2-20 SOLUTION INTRAVENOUS
Status: DISCONTINUED | OUTPATIENT
Start: 2019-09-23 | End: 2019-09-24

## 2019-09-23 RX ORDER — ACETAMINOPHEN 325 MG/1
650 TABLET ORAL EVERY 4 HOURS PRN
Status: DISCONTINUED | OUTPATIENT
Start: 2019-09-23 | End: 2019-09-24 | Stop reason: HOSPADM

## 2019-09-23 RX ORDER — ONDANSETRON 2 MG/ML
4 INJECTION INTRAMUSCULAR; INTRAVENOUS EVERY 6 HOURS PRN
Status: DISCONTINUED | OUTPATIENT
Start: 2019-09-23 | End: 2019-09-24 | Stop reason: HOSPADM

## 2019-09-23 RX ORDER — OXYTOCIN-SODIUM CHLORIDE 0.9% IV SOLN 30 UNIT/500ML 30-0.9/5 UT/ML-%
125 SOLUTION INTRAVENOUS CONTINUOUS PRN
Status: CANCELLED | OUTPATIENT
Start: 2019-09-23

## 2019-09-23 RX ORDER — METHYLERGONOVINE MALEATE 0.2 MG/ML
200 INJECTION INTRAVENOUS ONCE AS NEEDED
Status: CANCELLED | OUTPATIENT
Start: 2019-09-23

## 2019-09-23 RX ORDER — ONDANSETRON 4 MG/1
4 TABLET, FILM COATED ORAL EVERY 6 HOURS PRN
Status: DISCONTINUED | OUTPATIENT
Start: 2019-09-23 | End: 2019-09-24 | Stop reason: HOSPADM

## 2019-09-23 RX ORDER — OXYTOCIN-SODIUM CHLORIDE 0.9% IV SOLN 30 UNIT/500ML 30-0.9/5 UT/ML-%
999 SOLUTION INTRAVENOUS ONCE
Status: CANCELLED | OUTPATIENT
Start: 2019-09-23

## 2019-09-23 RX ORDER — ACETAMINOPHEN 325 MG/1
650 TABLET ORAL EVERY 4 HOURS PRN
Status: CANCELLED | OUTPATIENT
Start: 2019-09-23

## 2019-09-23 RX ADMIN — SODIUM CHLORIDE, POTASSIUM CHLORIDE, SODIUM LACTATE AND CALCIUM CHLORIDE 125 ML/HR: 600; 310; 30; 20 INJECTION, SOLUTION INTRAVENOUS at 22:05

## 2019-09-23 RX ADMIN — AMPICILLIN SODIUM 2 G: 2 INJECTION, POWDER, FOR SOLUTION INTRAMUSCULAR; INTRAVENOUS at 22:05

## 2019-09-23 RX ADMIN — TERBUTALINE SULFATE 0.25 MG: 1 INJECTION SUBCUTANEOUS at 23:58

## 2019-09-23 RX ADMIN — MISOPROSTOL 25 MCG: 100 TABLET ORAL at 22:49

## 2019-09-24 ENCOUNTER — ANESTHESIA EVENT (OUTPATIENT)
Dept: LABOR AND DELIVERY | Facility: HOSPITAL | Age: 24
End: 2019-09-24

## 2019-09-24 ENCOUNTER — ANESTHESIA (OUTPATIENT)
Dept: LABOR AND DELIVERY | Facility: HOSPITAL | Age: 24
End: 2019-09-24

## 2019-09-24 PROBLEM — Z34.90 PREGNANCY: Status: RESOLVED | Noted: 2019-09-23 | Resolved: 2019-09-24

## 2019-09-24 LAB
BASOPHILS # BLD AUTO: 0.02 10*3/MM3 (ref 0–0.2)
BASOPHILS NFR BLD AUTO: 0.1 % (ref 0–1.5)
DEPRECATED RDW RBC AUTO: 46.5 FL (ref 37–54)
EOSINOPHIL # BLD AUTO: 0.03 10*3/MM3 (ref 0–0.4)
EOSINOPHIL NFR BLD AUTO: 0.2 % (ref 0.3–6.2)
ERYTHROCYTE [DISTWIDTH] IN BLOOD BY AUTOMATED COUNT: 14.2 % (ref 12.3–15.4)
HCT VFR BLD AUTO: 32.9 % (ref 34–46.6)
HGB BLD-MCNC: 11.1 G/DL (ref 12–15.9)
IMM GRANULOCYTES # BLD AUTO: 0.07 10*3/MM3 (ref 0–0.05)
IMM GRANULOCYTES NFR BLD AUTO: 0.4 % (ref 0–0.5)
LYMPHOCYTES # BLD AUTO: 2.37 10*3/MM3 (ref 0.7–3.1)
LYMPHOCYTES NFR BLD AUTO: 14.1 % (ref 19.6–45.3)
MCH RBC QN AUTO: 30.3 PG (ref 26.6–33)
MCHC RBC AUTO-ENTMCNC: 33.7 G/DL (ref 31.5–35.7)
MCV RBC AUTO: 89.9 FL (ref 79–97)
MONOCYTES # BLD AUTO: 1.5 10*3/MM3 (ref 0.1–0.9)
MONOCYTES NFR BLD AUTO: 8.9 % (ref 5–12)
NEUTROPHILS # BLD AUTO: 12.78 10*3/MM3 (ref 1.7–7)
NEUTROPHILS NFR BLD AUTO: 76.3 % (ref 42.7–76)
PLATELET # BLD AUTO: 238 10*3/MM3 (ref 140–450)
PMV BLD AUTO: 10.3 FL (ref 6–12)
RBC # BLD AUTO: 3.66 10*6/MM3 (ref 3.77–5.28)
WBC NRBC COR # BLD: 16.77 10*3/MM3 (ref 3.4–10.8)

## 2019-09-24 PROCEDURE — 25010000003 MORPHINE PER 10 MG: Performed by: ANESTHESIOLOGY

## 2019-09-24 PROCEDURE — 25010000002 MORPHINE PER 10 MG: Performed by: OBSTETRICS & GYNECOLOGY

## 2019-09-24 PROCEDURE — 25010000003 CEFAZOLIN SODIUM-DEXTROSE 2-3 GM-%(50ML) RECONSTITUTED SOLUTION: Performed by: OBSTETRICS & GYNECOLOGY

## 2019-09-24 PROCEDURE — 25010000002 KETOROLAC TROMETHAMINE PER 15 MG: Performed by: OBSTETRICS & GYNECOLOGY

## 2019-09-24 PROCEDURE — 25010000002 DIPHENHYDRAMINE PER 50 MG

## 2019-09-24 PROCEDURE — 94799 UNLISTED PULMONARY SVC/PX: CPT

## 2019-09-24 PROCEDURE — 85025 COMPLETE CBC W/AUTO DIFF WBC: CPT | Performed by: OBSTETRICS & GYNECOLOGY

## 2019-09-24 PROCEDURE — 25010000002 FENTANYL CITRATE (PF) 100 MCG/2ML SOLUTION: Performed by: ANESTHESIOLOGY

## 2019-09-24 RX ORDER — FENTANYL CITRATE 50 UG/ML
50 INJECTION, SOLUTION INTRAMUSCULAR; INTRAVENOUS
Status: DISCONTINUED | OUTPATIENT
Start: 2019-09-24 | End: 2019-09-24 | Stop reason: HOSPADM

## 2019-09-24 RX ORDER — DIPHENHYDRAMINE HYDROCHLORIDE 50 MG/ML
25 INJECTION INTRAMUSCULAR; INTRAVENOUS EVERY 6 HOURS PRN
Status: DISCONTINUED | OUTPATIENT
Start: 2019-09-24 | End: 2019-09-24 | Stop reason: HOSPADM

## 2019-09-24 RX ORDER — MEPERIDINE HYDROCHLORIDE 25 MG/ML
12.5 INJECTION INTRAMUSCULAR; INTRAVENOUS; SUBCUTANEOUS
Status: DISCONTINUED | OUTPATIENT
Start: 2019-09-24 | End: 2019-09-24 | Stop reason: HOSPADM

## 2019-09-24 RX ORDER — MORPHINE SULFATE 2 MG/ML
2 INJECTION, SOLUTION INTRAMUSCULAR; INTRAVENOUS EVERY 4 HOURS PRN
Status: DISCONTINUED | OUTPATIENT
Start: 2019-09-24 | End: 2019-09-24 | Stop reason: HOSPADM

## 2019-09-24 RX ORDER — IPRATROPIUM BROMIDE AND ALBUTEROL SULFATE 2.5; .5 MG/3ML; MG/3ML
3 SOLUTION RESPIRATORY (INHALATION) ONCE AS NEEDED
Status: DISCONTINUED | OUTPATIENT
Start: 2019-09-24 | End: 2019-09-24 | Stop reason: HOSPADM

## 2019-09-24 RX ORDER — OXYTOCIN-SODIUM CHLORIDE 0.9% IV SOLN 30 UNIT/500ML 30-0.9/5 UT/ML-%
999 SOLUTION INTRAVENOUS ONCE
Status: DISCONTINUED | OUTPATIENT
Start: 2019-09-24 | End: 2019-09-24 | Stop reason: HOSPADM

## 2019-09-24 RX ORDER — SODIUM CHLORIDE, SODIUM LACTATE, POTASSIUM CHLORIDE, CALCIUM CHLORIDE 600; 310; 30; 20 MG/100ML; MG/100ML; MG/100ML; MG/100ML
125 INJECTION, SOLUTION INTRAVENOUS CONTINUOUS
Status: DISCONTINUED | OUTPATIENT
Start: 2019-09-24 | End: 2019-09-24 | Stop reason: HOSPADM

## 2019-09-24 RX ORDER — PRENATAL VIT/IRON FUM/FOLIC AC 27MG-0.8MG
1 TABLET ORAL DAILY
Status: DISCONTINUED | OUTPATIENT
Start: 2019-09-24 | End: 2019-09-24 | Stop reason: HOSPADM

## 2019-09-24 RX ORDER — SODIUM CHLORIDE, SODIUM LACTATE, POTASSIUM CHLORIDE, CALCIUM CHLORIDE 600; 310; 30; 20 MG/100ML; MG/100ML; MG/100ML; MG/100ML
INJECTION, SOLUTION INTRAVENOUS CONTINUOUS PRN
Status: DISCONTINUED | OUTPATIENT
Start: 2019-09-24 | End: 2019-09-24 | Stop reason: SURG

## 2019-09-24 RX ORDER — CARBOPROST TROMETHAMINE 250 UG/ML
250 INJECTION, SOLUTION INTRAMUSCULAR ONCE
Status: DISCONTINUED | OUTPATIENT
Start: 2019-09-24 | End: 2019-09-24 | Stop reason: HOSPADM

## 2019-09-24 RX ORDER — PRENATAL VIT/IRON FUM/FOLIC AC 27MG-0.8MG
1 TABLET ORAL DAILY
COMMUNITY
End: 2021-03-08 | Stop reason: ALTCHOICE

## 2019-09-24 RX ORDER — MISOPROSTOL 100 UG/1
600 TABLET ORAL ONCE AS NEEDED
Status: DISCONTINUED | OUTPATIENT
Start: 2019-09-24 | End: 2019-09-24 | Stop reason: HOSPADM

## 2019-09-24 RX ORDER — BISACODYL 5 MG/1
10 TABLET, DELAYED RELEASE ORAL DAILY PRN
Status: DISCONTINUED | OUTPATIENT
Start: 2019-09-24 | End: 2019-09-24 | Stop reason: HOSPADM

## 2019-09-24 RX ORDER — ONDANSETRON 4 MG/1
4 TABLET, FILM COATED ORAL EVERY 6 HOURS PRN
Status: DISCONTINUED | OUTPATIENT
Start: 2019-09-24 | End: 2019-09-24 | Stop reason: HOSPADM

## 2019-09-24 RX ORDER — OXYCODONE HYDROCHLORIDE AND ACETAMINOPHEN 5; 325 MG/1; MG/1
1 TABLET ORAL EVERY 4 HOURS PRN
Status: DISCONTINUED | OUTPATIENT
Start: 2019-09-24 | End: 2019-09-24 | Stop reason: HOSPADM

## 2019-09-24 RX ORDER — ARIPIPRAZOLE 10 MG/1
5 TABLET ORAL DAILY
Status: DISCONTINUED | OUTPATIENT
Start: 2019-09-24 | End: 2019-09-24

## 2019-09-24 RX ORDER — OXYTOCIN-SODIUM CHLORIDE 0.9% IV SOLN 30 UNIT/500ML 30-0.9/5 UT/ML-%
250 SOLUTION INTRAVENOUS CONTINUOUS
Status: ACTIVE | OUTPATIENT
Start: 2019-09-24 | End: 2019-09-24

## 2019-09-24 RX ORDER — ALUMINA, MAGNESIA, AND SIMETHICONE 2400; 2400; 240 MG/30ML; MG/30ML; MG/30ML
15 SUSPENSION ORAL EVERY 4 HOURS PRN
Status: DISCONTINUED | OUTPATIENT
Start: 2019-09-24 | End: 2019-09-24 | Stop reason: HOSPADM

## 2019-09-24 RX ORDER — TRISODIUM CITRATE DIHYDRATE AND CITRIC ACID MONOHYDRATE 500; 334 MG/5ML; MG/5ML
30 SOLUTION ORAL ONCE
Status: DISCONTINUED | OUTPATIENT
Start: 2019-09-24 | End: 2019-09-24 | Stop reason: HOSPADM

## 2019-09-24 RX ORDER — OXYCODONE HYDROCHLORIDE AND ACETAMINOPHEN 5; 325 MG/1; MG/1
2 TABLET ORAL EVERY 4 HOURS PRN
Qty: 25 TABLET | Refills: 0 | Status: SHIPPED | OUTPATIENT
Start: 2019-09-24 | End: 2023-02-13

## 2019-09-24 RX ORDER — PROMETHAZINE HYDROCHLORIDE 25 MG/1
12.5 TABLET ORAL EVERY 4 HOURS PRN
Status: DISCONTINUED | OUTPATIENT
Start: 2019-09-24 | End: 2019-09-24 | Stop reason: HOSPADM

## 2019-09-24 RX ORDER — MORPHINE SULFATE 0.5 MG/ML
INJECTION, SOLUTION EPIDURAL; INTRATHECAL; INTRAVENOUS AS NEEDED
Status: DISCONTINUED | OUTPATIENT
Start: 2019-09-24 | End: 2019-09-24 | Stop reason: SURG

## 2019-09-24 RX ORDER — PRENATAL VIT/IRON FUM/FOLIC AC 27MG-0.8MG
1 TABLET ORAL DAILY
Status: CANCELLED | OUTPATIENT
Start: 2019-09-24

## 2019-09-24 RX ORDER — FAMOTIDINE 10 MG/ML
20 INJECTION, SOLUTION INTRAVENOUS ONCE AS NEEDED
Status: DISCONTINUED | OUTPATIENT
Start: 2019-09-24 | End: 2019-09-24 | Stop reason: HOSPADM

## 2019-09-24 RX ORDER — IBUPROFEN 800 MG/1
800 TABLET ORAL EVERY 8 HOURS SCHEDULED
Status: DISCONTINUED | OUTPATIENT
Start: 2019-09-24 | End: 2019-09-24 | Stop reason: HOSPADM

## 2019-09-24 RX ORDER — DOCUSATE CALCIUM 240 MG
240 CAPSULE ORAL DAILY
Qty: 30 CAPSULE | Refills: 1 | Status: SHIPPED | OUTPATIENT
Start: 2019-09-24 | End: 2023-02-13

## 2019-09-24 RX ORDER — OXYCODONE AND ACETAMINOPHEN 10; 325 MG/1; MG/1
1 TABLET ORAL EVERY 4 HOURS PRN
Status: DISCONTINUED | OUTPATIENT
Start: 2019-09-24 | End: 2019-09-24 | Stop reason: HOSPADM

## 2019-09-24 RX ORDER — METHYLERGONOVINE MALEATE 0.2 MG/ML
200 INJECTION INTRAVENOUS ONCE AS NEEDED
Status: DISCONTINUED | OUTPATIENT
Start: 2019-09-24 | End: 2019-09-24 | Stop reason: HOSPADM

## 2019-09-24 RX ORDER — HYDROXYZINE 50 MG/1
50 TABLET, FILM COATED ORAL EVERY 6 HOURS PRN
Status: DISCONTINUED | OUTPATIENT
Start: 2019-09-24 | End: 2019-09-24 | Stop reason: HOSPADM

## 2019-09-24 RX ORDER — IBUPROFEN 600 MG/1
600 TABLET ORAL EVERY 6 HOURS PRN
Status: DISCONTINUED | OUTPATIENT
Start: 2019-09-24 | End: 2019-09-24 | Stop reason: HOSPADM

## 2019-09-24 RX ORDER — ONDANSETRON 2 MG/ML
4 INJECTION INTRAMUSCULAR; INTRAVENOUS EVERY 6 HOURS PRN
Status: DISCONTINUED | OUTPATIENT
Start: 2019-09-24 | End: 2019-09-24 | Stop reason: HOSPADM

## 2019-09-24 RX ORDER — LANOLIN 100 %
OINTMENT (GRAM) TOPICAL
Status: DISCONTINUED | OUTPATIENT
Start: 2019-09-24 | End: 2019-09-24 | Stop reason: HOSPADM

## 2019-09-24 RX ORDER — HYDROXYZINE HYDROCHLORIDE 10 MG/1
10 TABLET, FILM COATED ORAL 3 TIMES DAILY
Status: DISCONTINUED | OUTPATIENT
Start: 2019-09-24 | End: 2019-09-24

## 2019-09-24 RX ORDER — KETOROLAC TROMETHAMINE 30 MG/ML
30 INJECTION, SOLUTION INTRAMUSCULAR; INTRAVENOUS EVERY 6 HOURS PRN
Status: DISCONTINUED | OUTPATIENT
Start: 2019-09-24 | End: 2019-09-24 | Stop reason: HOSPADM

## 2019-09-24 RX ORDER — OXYCODONE HYDROCHLORIDE AND ACETAMINOPHEN 5; 325 MG/1; MG/1
1 TABLET ORAL ONCE AS NEEDED
Status: DISCONTINUED | OUTPATIENT
Start: 2019-09-24 | End: 2019-09-24 | Stop reason: HOSPADM

## 2019-09-24 RX ORDER — ACETAMINOPHEN 325 MG/1
650 TABLET ORAL EVERY 4 HOURS PRN
Status: DISCONTINUED | OUTPATIENT
Start: 2019-09-24 | End: 2019-09-24 | Stop reason: HOSPADM

## 2019-09-24 RX ORDER — ONDANSETRON 4 MG/1
4 TABLET, FILM COATED ORAL EVERY 8 HOURS PRN
Status: DISCONTINUED | OUTPATIENT
Start: 2019-09-24 | End: 2019-09-24 | Stop reason: HOSPADM

## 2019-09-24 RX ORDER — LEVOTHYROXINE SODIUM 0.03 MG/1
25 TABLET ORAL DAILY
Status: DISCONTINUED | OUTPATIENT
Start: 2019-09-24 | End: 2019-09-24 | Stop reason: HOSPADM

## 2019-09-24 RX ORDER — DIPHENHYDRAMINE HCL 25 MG
25 CAPSULE ORAL EVERY 4 HOURS PRN
Status: DISCONTINUED | OUTPATIENT
Start: 2019-09-24 | End: 2019-09-24 | Stop reason: HOSPADM

## 2019-09-24 RX ORDER — BUPIVACAINE HYDROCHLORIDE 7.5 MG/ML
INJECTION, SOLUTION EPIDURAL; RETROBULBAR
Status: COMPLETED | OUTPATIENT
Start: 2019-09-24 | End: 2019-09-24

## 2019-09-24 RX ORDER — SODIUM CHLORIDE 0.9 % (FLUSH) 0.9 %
3-10 SYRINGE (ML) INJECTION AS NEEDED
Status: DISCONTINUED | OUTPATIENT
Start: 2019-09-24 | End: 2019-09-24 | Stop reason: HOSPADM

## 2019-09-24 RX ORDER — MAGNESIUM HYDROXIDE 1200 MG/15ML
LIQUID ORAL AS NEEDED
Status: DISCONTINUED | OUTPATIENT
Start: 2019-09-24 | End: 2019-09-24 | Stop reason: HOSPADM

## 2019-09-24 RX ORDER — BUSPIRONE HYDROCHLORIDE 10 MG/1
10 TABLET ORAL 2 TIMES DAILY
Status: DISCONTINUED | OUTPATIENT
Start: 2019-09-24 | End: 2019-09-24

## 2019-09-24 RX ORDER — LURASIDONE HYDROCHLORIDE 40 MG/1
40 TABLET, FILM COATED ORAL DAILY
Status: DISCONTINUED | OUTPATIENT
Start: 2019-09-24 | End: 2019-09-24

## 2019-09-24 RX ORDER — OXYTOCIN 10 [USP'U]/ML
INJECTION, SOLUTION INTRAMUSCULAR; INTRAVENOUS AS NEEDED
Status: DISCONTINUED | OUTPATIENT
Start: 2019-09-24 | End: 2019-09-24 | Stop reason: SURG

## 2019-09-24 RX ORDER — IBUPROFEN 600 MG/1
600 TABLET ORAL EVERY 6 HOURS PRN
Qty: 30 TABLET | Refills: 0 | Status: SHIPPED | OUTPATIENT
Start: 2019-09-24 | End: 2019-10-24

## 2019-09-24 RX ORDER — FENTANYL CITRATE 50 UG/ML
INJECTION, SOLUTION INTRAMUSCULAR; INTRAVENOUS AS NEEDED
Status: DISCONTINUED | OUTPATIENT
Start: 2019-09-24 | End: 2019-09-24 | Stop reason: SURG

## 2019-09-24 RX ORDER — OXYTOCIN-SODIUM CHLORIDE 0.9% IV SOLN 30 UNIT/500ML 30-0.9/5 UT/ML-%
125 SOLUTION INTRAVENOUS CONTINUOUS PRN
Status: DISCONTINUED | OUTPATIENT
Start: 2019-09-24 | End: 2019-09-24 | Stop reason: HOSPADM

## 2019-09-24 RX ORDER — DIPHENHYDRAMINE HYDROCHLORIDE 50 MG/ML
INJECTION INTRAMUSCULAR; INTRAVENOUS
Status: COMPLETED
Start: 2019-09-24 | End: 2019-09-24

## 2019-09-24 RX ORDER — LURASIDONE HYDROCHLORIDE 40 MG/1
20 TABLET, FILM COATED ORAL DAILY
Status: DISCONTINUED | OUTPATIENT
Start: 2019-09-24 | End: 2019-09-24

## 2019-09-24 RX ORDER — MAGNESIUM HYDROXIDE 1200 MG/15ML
3000 LIQUID ORAL ONCE AS NEEDED
Status: DISCONTINUED | OUTPATIENT
Start: 2019-09-24 | End: 2019-09-24 | Stop reason: HOSPADM

## 2019-09-24 RX ORDER — CEFAZOLIN SODIUM 2 G/50ML
2 SOLUTION INTRAVENOUS ONCE
Status: COMPLETED | OUTPATIENT
Start: 2019-09-24 | End: 2019-09-24

## 2019-09-24 RX ORDER — MISOPROSTOL 100 UG/1
600 TABLET ORAL AS NEEDED
Status: DISCONTINUED | OUTPATIENT
Start: 2019-09-24 | End: 2019-09-24 | Stop reason: HOSPADM

## 2019-09-24 RX ORDER — SIMETHICONE 80 MG
80 TABLET,CHEWABLE ORAL 4 TIMES DAILY PRN
Status: DISCONTINUED | OUTPATIENT
Start: 2019-09-24 | End: 2019-09-24 | Stop reason: HOSPADM

## 2019-09-24 RX ORDER — ONDANSETRON 2 MG/ML
4 INJECTION INTRAMUSCULAR; INTRAVENOUS ONCE AS NEEDED
Status: DISCONTINUED | OUTPATIENT
Start: 2019-09-24 | End: 2019-09-24 | Stop reason: HOSPADM

## 2019-09-24 RX ADMIN — OXYCODONE HYDROCHLORIDE AND ACETAMINOPHEN 1 TABLET: 10; 325 TABLET ORAL at 17:37

## 2019-09-24 RX ADMIN — OXYCODONE HYDROCHLORIDE AND ACETAMINOPHEN 1 TABLET: 10; 325 TABLET ORAL at 12:25

## 2019-09-24 RX ADMIN — OXYTOCIN 20 UNITS: 10 INJECTION, SOLUTION INTRAMUSCULAR; INTRAVENOUS at 01:12

## 2019-09-24 RX ADMIN — MORPHINE SULFATE 0.3 MG: 0.5 INJECTION EPIDURAL; INTRATHECAL; INTRAVENOUS at 01:04

## 2019-09-24 RX ADMIN — ONDANSETRON 4 MG: 4 TABLET, FILM COATED ORAL at 12:25

## 2019-09-24 RX ADMIN — KETOROLAC TROMETHAMINE 30 MG: 30 INJECTION, SOLUTION INTRAMUSCULAR at 04:47

## 2019-09-24 RX ADMIN — CEFAZOLIN SODIUM 2 G: 2 SOLUTION INTRAVENOUS at 01:11

## 2019-09-24 RX ADMIN — IBUPROFEN 800 MG: 800 TABLET, FILM COATED ORAL at 17:38

## 2019-09-24 RX ADMIN — FENTANYL CITRATE 25 MCG: 50 INJECTION, SOLUTION INTRAMUSCULAR; INTRAVENOUS at 01:04

## 2019-09-24 RX ADMIN — BUPIVACAINE HYDROCHLORIDE 2 ML: 7.5 INJECTION, SOLUTION EPIDURAL; RETROBULBAR at 01:04

## 2019-09-24 RX ADMIN — SODIUM CHLORIDE, POTASSIUM CHLORIDE, SODIUM LACTATE AND CALCIUM CHLORIDE: 600; 310; 30; 20 INJECTION, SOLUTION INTRAVENOUS at 00:55

## 2019-09-24 RX ADMIN — LEVOTHYROXINE SODIUM 25 MCG: 25 TABLET ORAL at 17:37

## 2019-09-24 RX ADMIN — SODIUM CHLORIDE, POTASSIUM CHLORIDE, SODIUM LACTATE AND CALCIUM CHLORIDE 125 ML/HR: 600; 310; 30; 20 INJECTION, SOLUTION INTRAVENOUS at 07:20

## 2019-09-24 RX ADMIN — FENTANYL CITRATE 75 MCG: 50 INJECTION, SOLUTION INTRAMUSCULAR; INTRAVENOUS at 01:23

## 2019-09-24 RX ADMIN — MORPHINE SULFATE 2 MG: 2 INJECTION, SOLUTION INTRAMUSCULAR; INTRAVENOUS at 07:58

## 2019-09-24 RX ADMIN — DIPHENHYDRAMINE HYDROCHLORIDE 25 MG: 50 INJECTION INTRAMUSCULAR; INTRAVENOUS at 04:48

## 2019-09-24 RX ADMIN — MORPHINE SULFATE 4.7 MG: 0.5 INJECTION EPIDURAL; INTRATHECAL; INTRAVENOUS at 01:24

## 2019-09-24 NOTE — ANESTHESIA POSTPROCEDURE EVALUATION
Patient: Veronica Moore    Procedure Summary     Date:  19 Room / Location:  [facility] LABOR DELIVERY    Anesthesia Start:  55 Anesthesia Stop:  137    Procedure:   SECTION PRIMARY (Bilateral Abdomen) Diagnosis:      Surgeon:  Delmar Ryan MD Provider:  Andreas Espinosa MD    Anesthesia Type:  spinal ASA Status:  2 - Emergent          Anesthesia Type: spinal  Last vitals  BP   126/72 (19 0550)   Temp   99 °F (37.2 °C) (19 0550)   Pulse   98 (19 0550)   Resp   18 (19 0550)     SpO2   99 % (19 0550)     Post Anesthesia Care and Evaluation    Patient location during evaluation: PACU  Patient participation: complete - patient participated  Level of consciousness: awake and alert  Pain score: 1  Pain management: adequate  Airway patency: patent  Anesthetic complications: No anesthetic complications  PONV Status: controlled  Cardiovascular status: acceptable  Respiratory status: acceptable  Hydration status: acceptable

## 2019-09-24 NOTE — ANESTHESIA PREPROCEDURE EVALUATION
Anesthesia Evaluation     Patient summary reviewed and Nursing notes reviewed   no history of anesthetic complications:  NPO Solid Status: > 4 hours  NPO Liquid Status: > 4 hours           Airway   Mallampati: II  TM distance: >3 FB  Neck ROM: full  no difficulty expected  Dental - normal exam     Pulmonary - normal exam   (+) a smoker Current Smoked day of surgery,   Cardiovascular - negative cardio ROS and normal exam  Exercise tolerance: good (4-7 METS)    NYHA Classification: II        Neuro/Psych  (+) psychiatric history Depression,     GI/Hepatic/Renal/Endo    (+)   hypothyroidism,     Musculoskeletal (-) negative ROS    Abdominal  - normal exam    Bowel sounds: normal.   Substance History   (+) alcohol use,      OB/GYN    (+) Pregnant,         Other - negative ROS                       Anesthesia Plan    ASA 2 - emergent     spinal   (Non-reassuring FHTs in the   130's  )  Anesthetic plan, all risks, benefits, and alternatives have been provided, discussed and informed consent has been obtained with: patient.  Use of blood products discussed with patient  Consented to blood products.

## 2019-09-24 NOTE — ANESTHESIA PROCEDURE NOTES
Spinal Block    Pre-sedation assessment completed: 9/24/2019 12:55 AM    Patient reassessed immediately prior to procedure    Patient location during procedure: OB  Start Time: 9/24/2019 1:00 AM  Stop Time: 9/24/2019 1:04 AM  Indication:at surgeon's request, post-op pain management and procedure for pain  Performed By  Anesthesiologist: Andreas Espinosa MD  Preanesthetic Checklist  Completed: patient identified, site marked, surgical consent, pre-op evaluation, timeout performed, IV checked, risks and benefits discussed and monitors and equipment checked  Spinal Block Prep:  Patient Position:sitting  Sterile Tech:cap, gloves, mask and sterile barriers  Prep:Betadine  Patient Monitoring:blood pressure monitoring, continuous pulse oximetry and EKG  Spinal Block Procedure  Approach:midline  Guidance:landmark technique and palpation technique  Location:L3-L4  Needle Type:Taurus  Needle Gauge:24 G  Placement of Spinal needle event:cerebrospinal fluid aspirated  Paresthesia: no  Fluid Appearance:clear  Medications: bupivacaine PF (MARCAINE) injection 0.75%, 2 mL  Med Administered at 9/24/2019 1:04 AM   Post Assessment  Patient Tolerance:patient tolerated the procedure well with no apparent complications  Complications no

## 2019-09-25 VITALS
TEMPERATURE: 98.3 F | RESPIRATION RATE: 18 BRPM | WEIGHT: 210 LBS | DIASTOLIC BLOOD PRESSURE: 79 MMHG | HEIGHT: 62 IN | SYSTOLIC BLOOD PRESSURE: 128 MMHG | BODY MASS INDEX: 38.64 KG/M2 | HEART RATE: 84 BPM | OXYGEN SATURATION: 98 %

## 2020-08-05 ENCOUNTER — LAB (OUTPATIENT)
Dept: LAB | Facility: HOSPITAL | Age: 25
End: 2020-08-05

## 2020-08-05 ENCOUNTER — TRANSCRIBE ORDERS (OUTPATIENT)
Dept: LAB | Facility: HOSPITAL | Age: 25
End: 2020-08-05

## 2020-08-05 DIAGNOSIS — D89.89 RADIATION CHIMERA (HCC): ICD-10-CM

## 2020-08-05 DIAGNOSIS — M79.10 MYALGIA: ICD-10-CM

## 2020-08-05 DIAGNOSIS — G93.32 CHRONIC FATIGUE SYNDROME: ICD-10-CM

## 2020-08-05 DIAGNOSIS — M13.0 POLYARTHROPATHY: ICD-10-CM

## 2020-08-05 DIAGNOSIS — M13.0 POLYARTHROPATHY: Primary | ICD-10-CM

## 2020-08-05 LAB
CK SERPL-CCNC: 65 U/L (ref 20–180)
CRP SERPL-MCNC: 0.98 MG/DL (ref 0–0.5)
ERYTHROCYTE [SEDIMENTATION RATE] IN BLOOD: 50 MM/HR (ref 0–20)
URATE SERPL-MCNC: 4.1 MG/DL (ref 2.4–5.7)

## 2020-08-05 PROCEDURE — 82550 ASSAY OF CK (CPK): CPT

## 2020-08-05 PROCEDURE — 86140 C-REACTIVE PROTEIN: CPT

## 2020-08-05 PROCEDURE — 85652 RBC SED RATE AUTOMATED: CPT

## 2020-08-05 PROCEDURE — 84550 ASSAY OF BLOOD/URIC ACID: CPT

## 2020-08-05 PROCEDURE — 36415 COLL VENOUS BLD VENIPUNCTURE: CPT

## 2020-08-06 ENCOUNTER — OFFICE VISIT (OUTPATIENT)
Dept: PSYCHIATRY | Facility: CLINIC | Age: 25
End: 2020-08-06

## 2020-08-06 DIAGNOSIS — F43.10 POST TRAUMATIC STRESS DISORDER (PTSD): Primary | ICD-10-CM

## 2020-08-06 DIAGNOSIS — F10.21 ALCOHOL DEPENDENCE IN SUSTAINED FULL REMISSION (HCC): ICD-10-CM

## 2020-08-06 PROCEDURE — 90832 PSYTX W PT 30 MINUTES: CPT | Performed by: SOCIAL WORKER

## 2020-08-06 NOTE — PROGRESS NOTES
"Date of Service: 2020  Time In: 1:30 PM  Time Out:   1:50 PM       PROGRESS NOTE  Data:  Veronica Moore is a 24 y.o. female who met 1:1 with the undersigned for a regularly scheduled individual outpatient therapy session at his primary care [facility] for follow-up of alcohol dependence, depression, and PTSD.    You have chosen to receive care through a telephone visit. Do you consent to use a telephone visit for your medical care today? Yes  This visit has been rescheduled as a phone visit to comply with patient safety concerns in accordance with CDC recommendations. Total time of discussion was 20 minutes.  This provider is located at [facility], [address] [place] [address]. The provider identified himself as well as his credentials.   The Patient is at her home using her phone because problems with video connection. The patient's condition being diagnosed/treated is appropriate for telemedicine. The patient gave consent to be seen remotely, and when consent is given they understand that the consent allows for patient identifiable information to be sent to a third party as needed.   They may refuse to be seen remotely at any time. The electronic data is encrypted and password protected, and the patient has been advised of the potential risks to privacy not withstanding such measures.       HPI: Patient states \"the last year has been crazy\".  She reports her son was born but states it was a traumatic birth and she had to have an emergency  section along with the fact he was born not breathing and was immediately placed on a ventilator.  Patient states she was terrified that he would die and spent most of her time praying that God would take her instead of him.  Patient reports since that time her private psychiatrist relaxing intake he has diagnosed her with bipolar disorder as she has been struggling with mood swings and emotional lability.  Patient patient " "also reports what appears to be symptoms of posttraumatic stress disorder following trauma of her son being born in addition to the trauma she experienced as a .  The patient reports symptoms including hypervigilance, increased all response, obsessive worry over the health of her son, unwanted thoughts, and ongoing traumatic dreams.  Patient also reports periods of feeling on edge and difficulty interacting with others.  She states during those times she has difficulty not crying.  Patient rates current symptoms at a 6 on a scale of 1-10 with 10 being most severe.  She reports her psychiatrist is treating her with a mood stabilizer which she cannot identify and lamotrigine.  Patient states she feels as though they are somewhat helpful but states she continues to struggle with her mood and emotional stability.  Patient reports she continues to adhere to medication regimen as prescribed.  Patient adamantly convincingly denies suicidal ideation and vehemently denies any substance use.  However, she states she did have a \"setback\" whereby she drank alcohol on a regular basis for approximately 1 month.  She reports she has not drank in several months at this point.    Clinical Maneuvering/Intervention:  Assisted patient in processing above session content; acknowledged and normalized patient’s thoughts, feelings, and concerns.  Was unable to complete initial assessment on this date due to difficulty of conducting telephone session.  However, did utilize cognitive behavioral therapy to assist patient in identifying and developing a strategy to cope with and manage her emotional instability and encouraged her to utilize guided imagery and various other relaxation techniques.  Also encouraged patient to avail herself of available appropriate sources of support and strongly urged her to keep her appointment on 8 August 27, 2020 with the undersigned.  Focused on healthy skills of daily living and behavioral " activation.  Provided unconditional positive regard in a safe, supportive environment.    Allowed patient to freely discuss issues without interruption or judgment. Provided safe, confidential environment to facilitate the development of positive therapeutic relationship and encourage open, honest communication. Assisted patient in identifying risk factors which would indicate the need for higher level of care including thoughts to harm self or others and/or self-harming behavior and encouraged patient to contact this office, call 911, or present to the nearest emergency room should any of these events occur. Discussed crisis intervention services and means to access.  Patient adamantly and convincingly denies current suicidal or homicidal ideation or perceptual disturbance.    Assessment    Patient appears to be struggling with symptoms indicative of posttraumatic stress disorder which were likely triggered by a trauma of her son's birth approximately 10 months ago.  Patient symptomology is causing impairment in important areas of functioning.  As result, she can be reasonably expected to benefit from treatment and would likely be at increased risk for decompensation otherwise.  Diagnoses and all orders for this visit:    Post traumatic stress disorder (PTSD)    Alcohol dependence in sustained full remission (CMS/[facility])               Mental Status Exam  Hygiene: Not applicable due to telephone session  Dress: Not applicable due to telephone session  Attitude:  Cooperative  Motor Activity:  Appropriate  Speech:  Normal  Mood: Depressed/anxious  Affect: Not applicable due to telephone session  Thought Processes:  Linear  Thought Content:  normal  Suicidal Thoughts:  denies  Homicidal Thoughts:  denies  Crisis Safety Plan: yes, to come to the emergency room.  Hallucinations:  denies    Patient's Support Network Includes:  parents and extended family    Progress toward goal: Not at goal    Functional Status: Moderate  impairment     Prognosis: Fair with Ongoing Treatment       Plan         Patient will continue in individual outpatient therapy session at [facility] in approximately 3 weeks.  Patient will continue with her primary care provider and follow all recommendations including adhering to medication regimen as prescribed.  Patient will continue to follow up with prenatal care and follow all recommendations.  Patient will adhere to medication regimen as prescribed and report any side effects. Patient will contact this office, call 911 or present to the nearest emergency room should suicidal or homicidal ideations occur. Provide Cognitive Behavioral Therapy and Integrative Therapy to improve functioning, maintain stability, and avoid decompensation and the need for higher level of care.          Return in about 3 weeks (around 8/27/2020) for Next scheduled follow up.      This document signed by Gordon Leiva LCSW, JONATHAN August 6, 2020 14:16           Statement Selected

## 2020-09-24 ENCOUNTER — OFFICE VISIT (OUTPATIENT)
Dept: PSYCHIATRY | Facility: CLINIC | Age: 25
End: 2020-09-24

## 2020-09-24 DIAGNOSIS — F31.60 BIPOLAR AFFECTIVE DISORDER, MOST RECENT EPISODE MIXED (HCC): ICD-10-CM

## 2020-09-24 DIAGNOSIS — F43.10 POST TRAUMATIC STRESS DISORDER (PTSD): Primary | ICD-10-CM

## 2020-09-24 DIAGNOSIS — F10.21 ALCOHOL DEPENDENCE IN SUSTAINED FULL REMISSION (HCC): ICD-10-CM

## 2020-09-24 PROCEDURE — 90837 PSYTX W PT 60 MINUTES: CPT | Performed by: SOCIAL WORKER
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
## 2020-10-22 ENCOUNTER — OFFICE VISIT (OUTPATIENT)
Dept: PSYCHIATRY | Facility: CLINIC | Age: 25
End: 2020-10-22

## 2020-10-22 DIAGNOSIS — F10.21 ALCOHOL DEPENDENCE IN EARLY, EARLY PARTIAL, SUSTAINED FULL, OR SUSTAINED PARTIAL REMISSION (HCC): ICD-10-CM

## 2020-10-22 DIAGNOSIS — F31.60 BIPOLAR AFFECTIVE DISORDER, MOST RECENT EPISODE MIXED (HCC): ICD-10-CM

## 2020-10-22 DIAGNOSIS — F43.10 POST TRAUMATIC STRESS DISORDER (PTSD): Primary | ICD-10-CM

## 2020-10-22 PROCEDURE — 90837 PSYTX W PT 60 MINUTES: CPT | Performed by: SOCIAL WORKER

## 2020-10-22 RX ORDER — LURASIDONE HYDROCHLORIDE 40 MG/1
60 TABLET, FILM COATED ORAL DAILY
COMMUNITY

## 2020-10-22 RX ORDER — LAMOTRIGINE 25 MG/1
25 TABLET ORAL DAILY
COMMUNITY
End: 2021-03-08 | Stop reason: ALTCHOICE

## 2020-10-23 NOTE — PROGRESS NOTES
"Date of Service: October 22, 2020  Time In: 2:45 PM  Time Out: 3:45 PM      PROGRESS NOTE  Data:  Veronica Moore is a 25 y.o. female who met 1:1 with the undersigned for a regularly scheduled individual outpatient therapy session at his primary care of [place] for follow-up of alcohol dependence, depression, and PTSD.  Patient and the undersigned wore masks throughout the session and maintained appropriate distancing.       HPI: Patient states that for last few weeks have been very difficult and states she feels as though she has had difficulty coping.  Patient states she continues to go between elevated and depressed mood states multiple times throughout the night.  She also is struggling with automatic negative cognitions and states at times she realized that she thinks \"maybe I deserve to feel this way\".  Patient reports she and her  are struggling to communicate and states he also struggles with PTSD from his time in the .  She states he simply shuts down and isolates himself playing video games when he is not working.  The patient reports ongoing symptoms of PTSD including hypervigilance, increased all response, obsessive worry over the health of her son, unwanted thoughts, and ongoing traumatic dreams.  Patient also reports periods of feeling on edge and difficulty interacting with others.    Patient rates current symptoms at  an 8 on a scale of 1-10 with 10 being most severe.  Patient also reports symptoms which appear to be indicative of bipolar affective disorder including periods of elevated mood, irritability, tendency to lash out at others, difficulty being still, increased goal-directed activity, and decreased need for sleep.  Patient also reports significant periods of depressed mood, anhedonia, anergia, feeling hopeless and helpless, low self-esteem, and periods of hypersomnia.  Patient states at times she feels as though she moves between these 2 states multiple times within a " "day.  Patient rates current symptoms of depression and sense of hopelessness at an 8 on a scale 1-10 with 10 being most severe.  Patient reports she continues to adhere to medication regimen as prescribed.  Patient reports she has been struggling with fleeting thoughts of suicide as of late and states on a couple occasions it has scared her.  However, she adamantly denies intent or plan and is able to identify protective factors including her young son.  Patient also reluctantly divulges the fact she has been drinking intermittently and states she has drank to the point of being intoxicated several times over the past 2 weeks in an attempt to avoid her feelings and pain.  Patient adamantly convincingly denies suicidal ideation.    Clinical Maneuvering/Intervention:  Assisted patient in processing above session content; acknowledged and normalized patient’s thoughts, feelings, and concerns.  Allow the patient to discuss/vent her feelings of frustration concerning ongoing problems and validated her feelings.  Also discussed the importance of communication in any didactic relationship and discussed the use of \"high\" statements and reflective listening.  Also encouraged the patient to consider writing her  a letter as she finds it difficult to communicate with him and to be sure he understands her feelings and concerns.  Further strongly urged the patient to consider her recent return to alcohol use will only exacerbate her problems and strongly encouraged her to consider to discontinue her use.  Also discussed the possibility of significant withdrawal and encouraged the patient to present at the nearest emergency room or call 911 in the event she experiences any of these.  Continue to utilize cognitive behavioral therapy to assist the patient in developing appropriate coping mechanisms to decrease the severity and frequency of symptoms.  Also encouraged patient to contact her psychiatrist to request appointment " for medication reevaluation.  Provided unconditional positive regard in a safe, supportive environment.    Allowed patient to freely discuss issues without interruption or judgment. Provided safe, confidential environment to facilitate the development of positive therapeutic relationship and encourage open, honest communication. Assisted patient in identifying risk factors which would indicate the need for higher level of care including thoughts to harm self or others and/or self-harming behavior and encouraged patient to contact this office, call 911, or present to the nearest emergency room should any of these events occur. Discussed crisis intervention services and means to access.  Patient adamantly and convincingly denies current suicidal or homicidal ideation or perceptual disturbance.    Assessment    Patient is struggling with increased symptoms at this time which appear to be primarily precipitated by relationship issues which exacerbates symptoms from the patient's extensive history of trauma.  Patient is also struggling with fleeting suicidal ideation.  However, she adamantly convincingly denies current suicidal thoughts, intent, or plan.  As result, there is no evidence of imminent threat of harm to self or others.  Patient is also able to plan for safety and identify protective factors.  Patient symptomology is causing impairment in important areas of functioning.  As result, she can be reasonably expected to benefit from treatment and would likely be at increased risk for decompensation otherwise.  Diagnoses and all orders for this visit:    1. Post traumatic stress disorder (PTSD) (Primary)    2. Bipolar affective disorder, most recent episode mixed (CMS/[facility])    3. Alcohol dependence in partial remission (CMS/[facility])               Mental Status Exam  Hygiene: Good  Dress: Casual  Attitude:  Cooperative  Motor Activity:  Appropriate  Speech:  Normal  Mood: Depressed/anxious  Affect: Not able to assess due  to the patient wearing a mask  Thought Processes:  Linear  Thought Content:  normal  Suicidal Thoughts:  denies  Homicidal Thoughts:  denies  Crisis Safety Plan: yes, to come to the emergency room.  Hallucinations:  denies    Patient's Support Network Includes:  parents and extended family    Progress toward goal: Not at goal    Functional Status: Moderate impairment     Prognosis: Fair with Ongoing Treatment       Plan         Patient will continue in individual outpatient therapy session at [facility] in approximately 2 weeks.  Patient will continue with her primary care provider and follow all recommendations including adhering to medication regimen as prescribed.  Patient will continue to follow up with prenatal care and follow all recommendations.  Patient will adhere to medication regimen as prescribed and report any side effects. Patient will contact this office, call 911 or present to the nearest emergency room should suicidal or homicidal ideations occur. Provide Cognitive Behavioral Therapy and Integrative Therapy to improve functioning, maintain stability, and avoid decompensation and the need for higher level of care.          Return in about 2 weeks (around 11/5/2020) for Next scheduled follow up.      This document signed by Gordon Leiva LCSW, JONATHAN October 23, 2020 07:33 EDT

## 2020-11-05 ENCOUNTER — OFFICE VISIT (OUTPATIENT)
Dept: PSYCHIATRY | Facility: CLINIC | Age: 25
End: 2020-11-05

## 2020-11-05 DIAGNOSIS — Z63.0 MARITAL/PARTNER RELATIONAL PROBLEM: ICD-10-CM

## 2020-11-05 DIAGNOSIS — F10.21 ALCOHOL DEPENDENCE IN EARLY, EARLY PARTIAL, SUSTAINED FULL, OR SUSTAINED PARTIAL REMISSION (HCC): ICD-10-CM

## 2020-11-05 DIAGNOSIS — F43.10 POST TRAUMATIC STRESS DISORDER (PTSD): Primary | ICD-10-CM

## 2020-11-05 DIAGNOSIS — F31.60 BIPOLAR AFFECTIVE DISORDER, MOST RECENT EPISODE MIXED (HCC): ICD-10-CM

## 2020-11-05 PROCEDURE — 90837 PSYTX W PT 60 MINUTES: CPT | Performed by: SOCIAL WORKER

## 2020-11-05 SDOH — SOCIAL STABILITY - SOCIAL INSECURITY: PROBLEMS IN RELATIONSHIP WITH SPOUSE OR PARTNER: Z63.0

## 2020-11-09 NOTE — PROGRESS NOTES
"Date of Service: November 5, 2020  Time In: 12:30 PM  Time Out: 1:30 PM      PROGRESS NOTE  Data:  Veronica Moore is a 25 y.o. female who met 1:1 with the undersigned for a regularly scheduled individual outpatient therapy session at his primary care of [place] for follow-up of alcohol dependence, depression, and PTSD.  Patient and the undersigned wore masks throughout the session and maintained appropriate distancing.       HPI: Patient immediately presents with significant improvement in mood and affect and states she heated the undersigned is advised to contact her prescriber and discussed the possibility of medication adjustment including discussing lithium.  The patient states she was placed on lithium and reports her mood instability began improving immediately.  The patient states she is feeling significantly better and reports she feels hopeful and optimistic and has even began attempting to do things to make her life better as evidenced by her report she and a friend recently joined a gym and plans to become more active on a daily basis.  Patient reports she feels much more \"calm\" and states racing thoughts and impulsiveness have improved significantly.  Patient states she feels her mood has improved significantly currently also and reports no subjective feelings of depression at this time.  The patient reports ongoing symptoms of PTSD including hypervigilance, increased all response, obsessive worry over the health of her son, unwanted thoughts, and ongoing traumatic dreams.  Patient rates current symptoms of posttraumatic stress disorder at a 5 on a scale of 1-10 with 10 being most severe.  Patient does report she and her partner, and the father of her young son, continue to struggle and states they have little interaction.  In fact, the patient states he returns home from work and plays video games for the remainder of the day.  Patient states she feels as though she does not get any of his " "attention and states she is struggling with how their relationship will continue if unchanged.  Patient also reports a significant decrease in intimacy in their relationship and states \"I do not remember the last time\".  Patient reports she continues to adhere to medication regimen as prescribed.    Patient adamantly convincingly denies suicidal ideation, intent, or plan.  Patient admits she continues to drink intermittently but states she is drinking small amounts.  However, she is also able to call upon her time in recovery and in treatment to understand the danger and attempting to control her addiction.    Clinical Maneuvering/Intervention:  Assisted patient in processing above session content; acknowledged and normalized patient’s thoughts, feelings, and concerns.  Acknowledged the patient's reported improvement and agreed with her and praised her efforts.  However, I also cautioned the patient to continue to follow-up with her prescriber as lithium levels would require ongoing monitoring.  Further attempted to provide information/education concerning the patient's diagnosis and discussed the fact it is at least possible that her improvement in mood and focus and increase in goal-directed activity could be a result of claudia.  As result, strongly urged the patient to contact this office or her prescriber if symptoms change significantly.  Continue to assist the patient with discussing and processing her extensive history of hump trauma and how it has affected her life.  Also utilize cognitive processing to assist the patient in recognizing she developed her sense of self to some degree based on her trauma and continue to work on redefining herself.  Also continue to allow the patient to discuss/process difficulties in her relationship and encouraged her to consider the importance of communication in any didactic relationship.  As such, discussed the use of \"high\" statements and reflective listening along with " the tactic of writing a letter for her significant other which can increase the effectiveness of communication as it remove the motions and personalities from the process for the moment.  Continue to focus on healthy skills of daily living and behavioral activation.  Provided unconditional positive regard in a safe, supportive environment.    Allowed patient to freely discuss issues without interruption or judgment. Provided safe, confidential environment to facilitate the development of positive therapeutic relationship and encourage open, honest communication. Assisted patient in identifying risk factors which would indicate the need for higher level of care including thoughts to harm self or others and/or self-harming behavior and encouraged patient to contact this office, call 911, or present to the nearest emergency room should any of these events occur. Discussed crisis intervention services and means to access.  Patient adamantly and convincingly denies current suicidal or homicidal ideation or perceptual disturbance.    Assessment    Patient appears to be feeling significantly better following medication adjustment.  However, she has a long history of mood instability and continues to struggle with significant relationship issues.  Patient symptomology is causing impairment in important areas of functioning.  As result, she can be reasonably expected to benefit from treatment and would likely be at increased risk for decompensation otherwise.  Diagnoses and all orders for this visit:    1. Post traumatic stress disorder (PTSD) (Primary)    2. Bipolar affective disorder, most recent episode mixed (CMS/HCC)    3. Alcohol dependence in partial remission (CMS/[facility])    4. Marital/partner relational problem               Mental Status Exam  Hygiene: Good  Dress: Casual  Attitude:  Cooperative  Motor Activity:  Appropriate  Speech:  Normal  Mood: Euthymic  Affect: Happy  Thought Processes:  Linear  Thought Content:   normal  Suicidal Thoughts:  denies  Homicidal Thoughts:  denies  Crisis Safety Plan: yes, to come to the emergency room.  Hallucinations:  denies    Patient's Support Network Includes:  parents and extended family    Progress toward goal: Not at goal    Functional Status: Moderate impairment     Prognosis: Fair with Ongoing Treatment       Plan         Patient will continue in individual outpatient therapy session at [facility] in approximately 2 weeks.  Patient will continue with her primary care provider and follow all recommendations including adhering to medication regimen as prescribed.  Patient will continue in pharmacotherapy with prescriber and follow all recommendations including adhering to medication regimen as prescribed.   Patient will contact this office, call 911 or present to the nearest emergency room should suicidal or homicidal ideations occur. Provide Cognitive Behavioral Therapy and Integrative Therapy to improve functioning, maintain stability, and avoid decompensation and the need for higher level of care.          Return in about 2 weeks (around 11/19/2020) for Next scheduled follow up.      This document signed by Gordon Leiva LCSW, JONATHAN November 9, 2020 08:13 EST

## 2020-11-19 ENCOUNTER — OFFICE VISIT (OUTPATIENT)
Dept: PSYCHIATRY | Facility: CLINIC | Age: 25
End: 2020-11-19

## 2020-11-19 DIAGNOSIS — F10.21 ALCOHOL DEPENDENCE IN EARLY, EARLY PARTIAL, SUSTAINED FULL, OR SUSTAINED PARTIAL REMISSION (HCC): ICD-10-CM

## 2020-11-19 DIAGNOSIS — Z63.0 MARITAL/PARTNER RELATIONAL PROBLEM: ICD-10-CM

## 2020-11-19 DIAGNOSIS — F31.60 BIPOLAR AFFECTIVE DISORDER, MOST RECENT EPISODE MIXED (HCC): ICD-10-CM

## 2020-11-19 DIAGNOSIS — F43.10 POST TRAUMATIC STRESS DISORDER (PTSD): Primary | ICD-10-CM

## 2020-11-19 PROCEDURE — 90834 PSYTX W PT 45 MINUTES: CPT | Performed by: SOCIAL WORKER

## 2020-11-19 SDOH — SOCIAL STABILITY - SOCIAL INSECURITY: PROBLEMS IN RELATIONSHIP WITH SPOUSE OR PARTNER: Z63.0

## 2020-11-23 NOTE — PROGRESS NOTES
"Date of Service: November 19, 2020  Time In: 12:30 PM  Time Out: 1:20 PM      PROGRESS NOTE  Data:  Veronica Moore is a 25 y.o. female who met 1:1 with the undersigned for a regularly scheduled individual outpatient therapy session at his primary care of [place] for follow-up of alcohol dependence, depression, and PTSD.  Patient and the undersigned wore masks throughout the session and maintained appropriate distancing.       HPI: Patient states she continues to feel much better since beginning lithium and states she and a friend even joined a gym and has been going 3 to 4 days weekly.  Patient states her mood is much better and denies any significant depression or claudia at this time.  However, the patient does states she realizes she has a baseline of energy higher than most people.  Patient states she is simply excited to be feeling better.  The patient reports ongoing symptoms of PTSD including hypervigilance, increased all response, obsessive worry over the health of her son, unwanted thoughts, and ongoing traumatic dreams.  Patient rates current symptoms of posttraumatic stress disorder at a 5 on a scale of 1-10 with 10 being most severe.  Patient reports ongoing marital/partner relationship problems and states her boyfriend, and father of her son, spends most of his free time playing video games.  The patient states they have very little interaction and continues to report little to no intimacy.  Patient states \"I have been thinking if something does not change I may have to leave\".  Patient reports she continues to adhere to medication regimen as prescribed.    Patient adamantly convincingly denies suicidal ideation, intent, or plan.  Patient admits she continues to drink intermittently but states she is drinking small amounts.  However, she is also able to call upon her time in recovery and in treatment to understand the danger and attempting to control her addiction.  Patient reports she has an " appointment with the prescriber of her lithium and her primary care provider and plans to seek labs to follow her levels.    Clinical Maneuvering/Intervention:  Assisted patient in processing above session content; acknowledged and normalized patient’s thoughts, feelings, and concerns.  Continue to utilize motivational interviewing techniques including complex reflections to praise the patient's progress and her effort.  Also reiterated the importance of the patient having her lithium levels followed and encouraged her to discuss this with the appropriate providers.  Also allow the patient to discuss/vent her feelings of frustration with ongoing relationship issues and validated her feelings.  Utilized motivational interviewing techniques including complex reflections to assist patient in recognizing she cannot assume her partner understands her feelings and encouraged her to consider writing him a letter to make her feelings known to avoid emotional responses and triggering defenses.  Also praised the patient for her increased physical activity and encouraged her to continue to go to the gym weekly.  Continue to assist the patient and processing her long history of trauma and continue to attempt to build discrepancy and to continue the process of cognitive restructuring and habituation.  Provided unconditional positive regard in a safe, supportive environment.    Allowed patient to freely discuss issues without interruption or judgment. Provided safe, confidential environment to facilitate the development of positive therapeutic relationship and encourage open, honest communication. Assisted patient in identifying risk factors which would indicate the need for higher level of care including thoughts to harm self or others and/or self-harming behavior and encouraged patient to contact this office, call 911, or present to the nearest emergency room should any of these events occur. Discussed crisis intervention  services and means to access.  Patient adamantly and convincingly denies current suicidal or homicidal ideation or perceptual disturbance.    Assessment    Patient appears to continue to maintain relative stability as compared to her baseline since her prior visit provider began prescribing lithium several weeks ago.  However, she has a long history of mood instability and continues to struggle with significant relationship issues.  As a result, patient is at least susceptible to an external locus of control to some degree which makes her progress tenuous.  Patient symptomology is causing impairment in important areas of functioning.  As result, she can be reasonably expected to benefit from treatment and would likely be at increased risk for decompensation otherwise.  Diagnoses and all orders for this visit:    1. Post traumatic stress disorder (PTSD) (Primary)    2. Bipolar affective disorder, most recent episode mixed (CMS/[facility])    3. Alcohol dependence in partial remission (CMS/[facility])    4. Marital/partner relational problem               Mental Status Exam  Hygiene: Good  Dress: Casual  Attitude:  Cooperative  Motor Activity:  Appropriate  Speech:  Normal  Mood: Euthymic  Affect: Happy  Thought Processes:  Linear  Thought Content:  normal  Suicidal Thoughts:  denies  Homicidal Thoughts:  denies  Crisis Safety Plan: yes, to come to the emergency room.  Hallucinations:  denies    Patient's Support Network Includes:  parents and extended family    Progress toward goal: Not at goal    Functional Status: Moderate impairment     Prognosis: Fair with Ongoing Treatment       Plan         Patient will continue in individual outpatient therapy session at [facility] in approximately 2 weeks.  Patient will continue with her primary care provider and follow all recommendations including adhering to medication regimen as prescribed.  Patient will continue in pharmacotherapy with prescriber and follow  all recommendations including adhering to medication regimen as prescribed.   Patient will contact this office, call 911 or present to the nearest emergency room should suicidal or homicidal ideations occur. Provide Cognitive Behavioral Therapy and Integrative Therapy to improve functioning, maintain stability, and avoid decompensation and the need for higher level of care.          Return in about 2 weeks (around 12/3/2020) for Next scheduled follow up.      This document signed by Gordon Leiva LCSW, [facility] November 23, 2020 07:45 EST

## 2020-12-03 ENCOUNTER — OFFICE VISIT (OUTPATIENT)
Dept: PSYCHIATRY | Facility: CLINIC | Age: 25
End: 2020-12-03

## 2020-12-03 DIAGNOSIS — F31.60 BIPOLAR AFFECTIVE DISORDER, MOST RECENT EPISODE MIXED (HCC): ICD-10-CM

## 2020-12-03 DIAGNOSIS — F43.10 POST TRAUMATIC STRESS DISORDER (PTSD): Primary | ICD-10-CM

## 2020-12-03 DIAGNOSIS — F10.21 ALCOHOL DEPENDENCE IN EARLY, EARLY PARTIAL, SUSTAINED FULL, OR SUSTAINED PARTIAL REMISSION (HCC): ICD-10-CM

## 2020-12-03 DIAGNOSIS — Z63.0 MARITAL/PARTNER RELATIONAL PROBLEM: ICD-10-CM

## 2020-12-03 PROCEDURE — 90837 PSYTX W PT 60 MINUTES: CPT | Performed by: SOCIAL WORKER

## 2020-12-03 SDOH — SOCIAL STABILITY - SOCIAL INSECURITY: PROBLEMS IN RELATIONSHIP WITH SPOUSE OR PARTNER: Z63.0

## 2020-12-03 NOTE — PROGRESS NOTES
"Date of Service: December 3, 2020  Time In: 1:00 PM  Time Out: 1:55 PM      PROGRESS NOTE  Data:  Veronica Moore is a 25 y.o. female who met 1:1 with the undersigned for a regularly scheduled individual outpatient therapy session at his primary care of [place] for follow-up of alcohol dependence, depression, and PTSD.  Patient and the undersigned wore masks throughout the session and maintained appropriate distancing.       HPI: Patient reports she continues to feel better but states she feels as though she is struggling with slightly increased depression due to not being able to go to the gym as of late due to the holidays and other issues including not having a vehicle as hers has been in the shop.  The patient reports ongoing symptoms of PTSD including hypervigilance, increased all response, obsessive worry over the health of her son, unwanted thoughts, and ongoing traumatic dreams.  Patient rates current symptoms of posttraumatic stress disorder at a 5 on a scale of 1-10 with 10 being most severe.  Patient reports ongoing marital/partner relationship problems and states her boyfriend, and father of her son, spends most of his free time playing video games.  The patient states they have very little interaction and continues to report little to no intimacy.  Patient states \"I have been thinking if something does not change I may have to leave\".  Patient reports she continues to adhere to medication regimen as prescribed.    Patient adamantly convincingly denies suicidal ideation, intent, or plan.  Patient refrains from discussing alcohol use during the session.    Clinical Maneuvering/Intervention:  Assisted patient in processing above session content; acknowledged and normalized patient’s thoughts, feelings, and concerns.  Assisted the patient in identifying what appears to be depressive phase of her illness and utilize cognitive [place] therapy to assist patient in developing appropriate coping " mechanisms decrease in severity and frequency of symptoms.  Also utilized motivational reviewing techniques including complex reflections to assist patient in identifying and acknowledging strategies she is found to be effective including going to the gym on a regular basis and strongly urged her to make this a priority.  Provided unconditional positive regard in a safe, supportive environment.    Allowed patient to freely discuss issues without interruption or judgment. Provided safe, confidential environment to facilitate the development of positive therapeutic relationship and encourage open, honest communication. Assisted patient in identifying risk factors which would indicate the need for higher level of care including thoughts to harm self or others and/or self-harming behavior and encouraged patient to contact this office, call 911, or present to the nearest emergency room should any of these events occur. Discussed crisis intervention services and means to access.  Patient adamantly and convincingly denies current suicidal or homicidal ideation or perceptual disturbance.    Assessment    Patient appears to continue to maintain relative stability as compared to her baseline since her prior visit provider began prescribing lithium several weeks ago.  However, she has a long history of mood instability and continues to struggle with significant relationship issues.  As a result, patient is at least susceptible to an external locus of control to some degree which makes her progress tenuous.  Patient symptomology is causing impairment in important areas of functioning.  As result, she can be reasonably expected to benefit from treatment and would likely be at increased risk for decompensation otherwise.  Diagnoses and all orders for this visit:    1. Post traumatic stress disorder (PTSD) (Primary)    2. Bipolar affective disorder, most recent episode mixed (CMS/HCC)    3. Alcohol dependence in partial remission  (CMS/[facility])    4. Marital/partner relational problem               Mental Status Exam  Hygiene: Good  Dress: Casual  Attitude:  Cooperative  Motor Activity:  Appropriate  Speech:  Normal  Mood: Euthymic  Affect: Happy  Thought Processes:  Linear  Thought Content:  normal  Suicidal Thoughts:  denies  Homicidal Thoughts:  denies  Crisis Safety Plan: yes, to come to the emergency room.  Hallucinations:  denies    Patient's Support Network Includes:  parents and extended family    Progress toward goal: Not at goal    Functional Status: Moderate impairment     Prognosis: Fair with Ongoing Treatment       Plan         Patient will continue in individual outpatient therapy session at [facility] in approximately 2 weeks.  Patient will continue with her primary care provider and follow all recommendations including adhering to medication regimen as prescribed.  Patient will continue in pharmacotherapy with prescriber and follow all recommendations including adhering to medication regimen as prescribed.   Patient will contact this office, call 911 or present to the nearest emergency room should suicidal or homicidal ideations occur. Provide Cognitive Behavioral Therapy and Integrative Therapy to improve functioning, maintain stability, and avoid decompensation and the need for higher level of care.          Return in about 2 weeks (around 12/17/2020) for Next scheduled follow up.      This document signed by Gordon Leiva LCSW, [facility] December 3, 2020 17:14 EST

## 2020-12-17 ENCOUNTER — OFFICE VISIT (OUTPATIENT)
Dept: PSYCHIATRY | Facility: CLINIC | Age: 25
End: 2020-12-17

## 2020-12-17 DIAGNOSIS — F31.60 BIPOLAR AFFECTIVE DISORDER, MOST RECENT EPISODE MIXED (HCC): ICD-10-CM

## 2020-12-17 DIAGNOSIS — F10.21 ALCOHOL DEPENDENCE IN EARLY, EARLY PARTIAL, SUSTAINED FULL, OR SUSTAINED PARTIAL REMISSION (HCC): ICD-10-CM

## 2020-12-17 DIAGNOSIS — F43.10 POST TRAUMATIC STRESS DISORDER (PTSD): Primary | ICD-10-CM

## 2020-12-17 DIAGNOSIS — Z63.0 MARITAL/PARTNER RELATIONAL PROBLEM: ICD-10-CM

## 2020-12-17 PROCEDURE — 90837 PSYTX W PT 60 MINUTES: CPT | Performed by: SOCIAL WORKER

## 2020-12-17 SDOH — SOCIAL STABILITY - SOCIAL INSECURITY: PROBLEMS IN RELATIONSHIP WITH SPOUSE OR PARTNER: Z63.0

## 2020-12-21 NOTE — PROGRESS NOTES
"Date of Service: December 17, 2020  Time In: 1:00 PM  Time Out: 2:00 PM      PROGRESS NOTE  Data:  Veronica Moore is a 25 y.o. female who met 1:1 with the undersigned for a regularly scheduled individual outpatient therapy session at his primary care of [place] for follow-up of alcohol dependence, depression, and PTSD.  Patient and the undersigned wore masks throughout the session and maintained appropriate distancing.       HPI: Patient states she continues to feel \"better and more stable\" since beginning lithium but reports she continues to have mood instability and states she is becoming depressed due to her marital situation and the fact she has difficulty getting her  to watch their son so she can go to the gym.  Patient does not gleefully report she has signed for classes to begin in the spring and states she believes she would like to pursue becoming a registered nurse.  The patient reports ongoing symptoms of PTSD including hypervigilance, increased all response, obsessive worry over the health of her son, unwanted thoughts, and ongoing traumatic dreams.  Patient rates current symptoms of posttraumatic stress disorder at a 4 on a scale of 1-10 with 10 being most severe.  Patient does states she feels as though symptoms of posttraumatic stress disorder are not a significant when she is feeling better in general.  Patient reports ongoing marital/partner relationship problems and states her boyfriend, and father of her son, spends most of his free time playing video games.  The patient states they have very little interaction and continues to report little to no intimacy.  Patient states she is not sure how to address the situation and has difficulty communicating her feelings.  Patient reports she continues to adhere to medication regimen as prescribed.    Patient adamantly convincingly denies suicidal ideation, intent, or plan.  Patient refrains from discussing alcohol use during the " "session.    Clinical Maneuvering/Intervention:  Assisted patient in processing above session content; acknowledged and normalized patient’s thoughts, feelings, and concerns.  Praised the patient for signing up for classes and encouraged her to follow through.  Also utilize solution focused therapy and cognitive behavioral therapy to assist the patient in developing a strategy to address the issues with her partner including using \"I\" statements and reflective listening.  Also encouraged the patient to consider writing him a letter which would remove much emotional distress and give her requisite time required to make her feelings known.  Continue to praised the patient for her improvement and encouraged her to do things she knows is helpful including taking her medication as prescribed, spending time with family and her son, and going to the gym.  Also validated the patient's feelings concerning her relationship and encouraged her to remind herself it is not unreasonable to expect her significant other to spend time with her and to spend time with their son.  Provided unconditional positive regard in a safe, supportive environment.    Allowed patient to freely discuss issues without interruption or judgment. Provided safe, confidential environment to facilitate the development of positive therapeutic relationship and encourage open, honest communication. Assisted patient in identifying risk factors which would indicate the need for higher level of care including thoughts to harm self or others and/or self-harming behavior and encouraged patient to contact this office, call 911, or present to the nearest emergency room should any of these events occur. Discussed crisis intervention services and means to access.  Patient adamantly and convincingly denies current suicidal or homicidal ideation or perceptual disturbance.    Assessment    Patient appears to continue to maintain relative stability as compared to her " baseline since her prior visit provider began prescribing lithium several weeks ago.  However, she has a long history of mood instability and continues to struggle with significant relationship issues.  As a result, patient is at least susceptible to an external locus of control to some degree which makes her progress tenuous.  Patient symptomology is causing impairment in important areas of functioning.  As result, she can be reasonably expected to benefit from treatment and would likely be at increased risk for decompensation otherwise.  Diagnoses and all orders for this visit:    1. Post traumatic stress disorder (PTSD) (Primary)    2. Bipolar affective disorder, most recent episode mixed (CMS/[facility])    3. Alcohol dependence in partial remission (CMS/[facility])    4. Marital/partner relational problem               Mental Status Exam  Hygiene: Good  Dress: Casual  Attitude:  Cooperative  Motor Activity:  Appropriate  Speech:  Normal  Mood: Euthymic  Affect: Happy  Thought Processes:  Linear  Thought Content:  normal  Suicidal Thoughts:  denies  Homicidal Thoughts:  denies  Crisis Safety Plan: yes, to come to the emergency room.  Hallucinations:  denies    Patient's Support Network Includes:  parents and extended family    Progress toward goal: Not at goal    Functional Status: Moderate impairment     Prognosis: Fair with Ongoing Treatment       Plan         Patient will continue in individual outpatient therapy session at [facility] in approximately 2 weeks.  Patient will continue with her primary care provider and follow all recommendations including adhering to medication regimen as prescribed.  Patient will continue in pharmacotherapy with prescriber and follow all recommendations including adhering to medication regimen as prescribed.   Patient will contact this office, call 911 or present to the nearest emergency room should suicidal or homicidal ideations occur. Provide Cognitive  Behavioral Therapy and Integrative Therapy to improve functioning, maintain stability, and avoid decompensation and the need for higher level of care.          Return in about 3 weeks (around 1/7/2021) for Next scheduled follow up.      This document signed by Gordon Leiva LCSW, [facility] December 21, 2020 08:50 EST

## 2020-12-28 ENCOUNTER — OFFICE VISIT (OUTPATIENT)
Dept: PSYCHIATRY | Facility: CLINIC | Age: 25
End: 2020-12-28

## 2020-12-28 DIAGNOSIS — Z63.0 MARITAL/PARTNER RELATIONAL PROBLEM: ICD-10-CM

## 2020-12-28 DIAGNOSIS — F10.21 ALCOHOL DEPENDENCE IN SUSTAINED FULL REMISSION (HCC): ICD-10-CM

## 2020-12-28 DIAGNOSIS — F43.10 POST TRAUMATIC STRESS DISORDER (PTSD): Primary | ICD-10-CM

## 2020-12-28 DIAGNOSIS — F31.60 BIPOLAR AFFECTIVE DISORDER, MOST RECENT EPISODE MIXED (HCC): ICD-10-CM

## 2020-12-28 PROCEDURE — 90837 PSYTX W PT 60 MINUTES: CPT | Performed by: SOCIAL WORKER

## 2020-12-28 SDOH — SOCIAL STABILITY - SOCIAL INSECURITY: PROBLEMS IN RELATIONSHIP WITH SPOUSE OR PARTNER: Z63.0

## 2020-12-28 NOTE — PROGRESS NOTES
"Date of Service: December 28, 2020  Time In: 1:00 PM  Time Out: 2:00 PM      PROGRESS NOTE  Data:  Veronica Moore is a 25 y.o. female who met 1:1 with the undersigned for a regularly scheduled individual outpatient therapy session at his primary care of [place] for follow-up of alcohol dependence, depression, and PTSD.  Patient and the undersigned wore masks throughout the session and maintained appropriate distancing.       HPI: Patient reports she feels as though she is currently in the depressive phase of her bipolar disorder and states she feels somewhat \"blah\" and hopeless.  The patient reports ongoing symptoms of PTSD including hypervigilance, increased all response, obsessive worry over the health of her son, unwanted thoughts, and ongoing traumatic dreams.  Patient rates current symptoms of posttraumatic stress disorder at a 4/5 on a scale of 1-10 with 10 being most severe.    Patient reports ongoing marital/partner relationship problems and states her boyfriend, and father of her son, spends most of his free time playing video games.  Patient reports she continues to struggle with various medical issues including periodic feeling dizzy and losing balance, tingling in her fingertips, and short-term memory loss which she believes is somehow related.  Patient states she has upcoming appointment with rheumatologist which was canceled due to provider no longer coming to this area.  She states she plans to attempt to schedule a follow-up.  Patient reports she continues to adhere to medication regimen as prescribed.    Patient adamantly convincingly denies suicidal ideation, intent, or plan.  Patient refrains from discussing alcohol use during the session.    Clinical Maneuvering/Intervention:  Assisted patient in processing above session content; acknowledged and normalized patient’s thoughts, feelings, and concerns.  Utilize cognitive [place] therapy to assist the patient in developing appropriate coping " mechanisms decrease in severity and frequency of symptoms and encouraged patient to continue to engage in activities she knows to be helpful including going to the gym on a daily basis and spending time with her family including her son.  Also strongly urged the patient to contact rheumatologist and ask for soonest appointment and if that cannot be within the month to discuss other referrals with her primary care provider.  Provided unconditional positive regard in a safe, supportive environment.    Allowed patient to freely discuss issues without interruption or judgment. Provided safe, confidential environment to facilitate the development of positive therapeutic relationship and encourage open, honest communication. Assisted patient in identifying risk factors which would indicate the need for higher level of care including thoughts to harm self or others and/or self-harming behavior and encouraged patient to contact this office, call 911, or present to the nearest emergency room should any of these events occur. Discussed crisis intervention services and means to access.  Patient adamantly and convincingly denies current suicidal or homicidal ideation or perceptual disturbance.    Assessment    Patient appears to continue to maintain relative stability as compared to her baseline since her prior visit provider began prescribing lithium several weeks ago.  However, she has a long history of mood instability and continues to struggle with significant relationship issues.  As a result, patient is at least susceptible to an external locus of control to some degree which makes her progress tenuous.  Patient symptomology is causing impairment in important areas of functioning.  As result, she can be reasonably expected to benefit from treatment and would likely be at increased risk for decompensation otherwise.  Diagnoses and all orders for this visit:    1. Post traumatic stress disorder (PTSD) (Primary)    2.  Bipolar affective disorder, most recent episode mixed (CMS/HCC)    3. Alcohol dependence in sustained full remission (CMS/HCC)    4. Marital/partner relational problem               Mental Status Exam  Hygiene: Good  Dress: Casual  Attitude:  Cooperative  Motor Activity:  Appropriate  Speech:  Normal  Mood: Euthymic  Affect: Happy  Thought Processes:  Linear  Thought Content:  normal  Suicidal Thoughts:  denies  Homicidal Thoughts:  denies  Crisis Safety Plan: yes, to come to the emergency room.  Hallucinations:  denies    Patient's Support Network Includes:  parents and extended family    Progress toward goal: Not at goal    Functional Status: Moderate impairment     Prognosis: Fair with Ongoing Treatment       Plan         Patient will continue in individual outpatient therapy session at [facility] in approximately 3 weeks.  Patient will continue with her primary care provider and follow all recommendations including adhering to medication regimen as prescribed.  Patient will continue in pharmacotherapy with prescriber and follow all recommendations including adhering to medication regimen as prescribed.   Patient will contact this office, call 911 or present to the nearest emergency room should suicidal or homicidal ideations occur. Provide Cognitive Behavioral Therapy and Integrative Therapy to improve functioning, maintain stability, and avoid decompensation and the need for higher level of care.          Return in about 3 weeks (around 1/18/2021) for Next scheduled follow up.      This document signed by Gordon Leiva LCSW, [facility] December 28, 2020 17:45 EST

## 2021-01-21 ENCOUNTER — OFFICE VISIT (OUTPATIENT)
Dept: PSYCHIATRY | Facility: CLINIC | Age: 26
End: 2021-01-21

## 2021-01-21 DIAGNOSIS — Z63.0 MARITAL/PARTNER RELATIONAL PROBLEM: ICD-10-CM

## 2021-01-21 DIAGNOSIS — F10.21 ALCOHOL DEPENDENCE IN SUSTAINED FULL REMISSION (HCC): ICD-10-CM

## 2021-01-21 DIAGNOSIS — F31.60 BIPOLAR AFFECTIVE DISORDER, MOST RECENT EPISODE MIXED (HCC): ICD-10-CM

## 2021-01-21 DIAGNOSIS — F43.10 POST TRAUMATIC STRESS DISORDER (PTSD): Primary | ICD-10-CM

## 2021-01-21 PROCEDURE — 90834 PSYTX W PT 45 MINUTES: CPT | Performed by: SOCIAL WORKER

## 2021-01-21 SDOH — SOCIAL STABILITY - SOCIAL INSECURITY: PROBLEMS IN RELATIONSHIP WITH SPOUSE OR PARTNER: Z63.0

## 2021-01-28 NOTE — PROGRESS NOTES
"Date of Service: January 21, 2021  Time In: 2:30 PM  Time Out: 3:10 PM      PROGRESS NOTE  Data:  Veronica Moore is a 25 y.o. female who met 1:1 with the undersigned for a regularly scheduled individual outpatient therapy session at his primary care of [place] for follow-up of alcohol dependence, depression, and PTSD.  Patient and the undersigned wore masks throughout the session and maintained appropriate distancing.       HPI: Patient states she feels she is doing \"okay\" concerning her mood and states she does feel as though she goes between mild symptoms of depression and feeling somewhat more \"upbeat\".  Patient reports she does continue to believe it is less and less likely her relationship with her son's father will work out as he appears to be unwilling to work on their issues.  She states she feels as though she is simply there and taking care of their son.  Patient reports her boyfriend, and the father of her son, continues to spend most of his time when he is at home playing video games and has limited interaction with her or their son.  The patient reports ongoing symptoms of PTSD including hypervigilance, increased all response, unwanted thoughts, and ongoing traumatic dreams.  Patient rates current symptoms of posttraumatic stress disorder at a 4 on a scale of 1-10 with 10 being most severe.      Patient reports she continues to adhere to medication regimen as prescribed.    Patient adamantly convincingly denies suicidal ideation, intent, or plan.  Patient refrains from discussing alcohol use during the session.    Clinical Maneuvering/Intervention:  Assisted patient in processing above session content; acknowledged and normalized patient’s thoughts, feelings, and concerns.  Attempted to assist the patient in addressing her marital partner relational issues and encouraged her to consider writing her son's father a letter which will remove her emotions and allow her to take her time and be sure she " is able to make her feelings known.  However, also utilized motivational reviewing techniques including complex reflections to discussed the concept of things we can control things we cannot control encouraged her to remind herself she cannot control his response or whether or not he is willing to address their issues.  Also utilized reality based theory to encourage the patient to see things the way they are rather than she would like them to be.  Also praised the patient for the steps she has taken to bring about positive change including beginning college classes.  Continue to utilize cognitive behavioral therapy to assist patient in developing appropriate coping mechanisms decrease in severity and frequency of symptoms.  Continue to encourage patient to keep all of her appointments with her primary care provider and psychiatrist and to continue to keep up with all of her labs including her lithium levels.  Provided unconditional positive regard in a safe, supportive environment.    Allowed patient to freely discuss issues without interruption or judgment. Provided safe, confidential environment to facilitate the development of positive therapeutic relationship and encourage open, honest communication. Assisted patient in identifying risk factors which would indicate the need for higher level of care including thoughts to harm self or others and/or self-harming behavior and encouraged patient to contact this office, call 911, or present to the nearest emergency room should any of these events occur. Discussed crisis intervention services and means to access.  Patient adamantly and convincingly denies current suicidal or homicidal ideation or perceptual disturbance.    Assessment    Patient appears to continue to maintain relative stability as compared to her baseline since her prior visit provider began prescribing lithium several weeks ago.  However, she has a long history of mood instability and continues to  struggle with significant relationship issues.  As a result, patient is at least susceptible to an external locus of control to some degree which makes her progress tenuous.  Patient symptomology is causing impairment in important areas of functioning.  As result, she can be reasonably expected to benefit from treatment and would likely be at increased risk for decompensation otherwise.  Diagnoses and all orders for this visit:    1. Post traumatic stress disorder (PTSD) (Primary)    2. Bipolar affective disorder, most recent episode mixed (CMS/[facility])    3. Alcohol dependence in sustained full remission (CMS/[facility])    4. Marital/partner relational problem               Mental Status Exam  Hygiene: Good  Dress: Casual  Attitude:  Cooperative  Motor Activity:  Appropriate  Speech:  Normal  Mood: Euthymic  Affect: Happy  Thought Processes:  Linear  Thought Content:  normal  Suicidal Thoughts:  denies  Homicidal Thoughts:  denies  Crisis Safety Plan: yes, to come to the emergency room.  Hallucinations:  denies    Patient's Support Network Includes:  parents and extended family    Progress toward goal: Not at goal    Functional Status: Moderate impairment     Prognosis: Fair with Ongoing Treatment       Plan         Patient will continue in individual outpatient therapy session at [facility] in approximately 3 weeks.  Patient will continue with her primary care provider and follow all recommendations including adhering to medication regimen as prescribed.  Patient will continue in pharmacotherapy with prescriber and follow all recommendations including adhering to medication regimen as prescribed.   Patient will contact this office, call 911 or present to the nearest emergency room should suicidal or homicidal ideations occur. Provide Cognitive Behavioral Therapy and Integrative Therapy to improve functioning, maintain stability, and avoid decompensation and the need for higher level of care.           Return in about 3 weeks (around 2/11/2021) for Next scheduled follow up.      This document signed by Gordon Leiva LCSW, [facility] January 28, 2021 07:55 EST

## 2021-02-08 ENCOUNTER — OFFICE VISIT (OUTPATIENT)
Dept: PSYCHIATRY | Facility: CLINIC | Age: 26
End: 2021-02-08

## 2021-02-08 DIAGNOSIS — F10.21 ALCOHOL DEPENDENCE IN SUSTAINED FULL REMISSION (HCC): ICD-10-CM

## 2021-02-08 DIAGNOSIS — F43.10 POST TRAUMATIC STRESS DISORDER (PTSD): Primary | ICD-10-CM

## 2021-02-08 DIAGNOSIS — Z63.0 MARITAL/PARTNER RELATIONAL PROBLEM: ICD-10-CM

## 2021-02-08 DIAGNOSIS — F31.60 BIPOLAR AFFECTIVE DISORDER, MOST RECENT EPISODE MIXED (HCC): ICD-10-CM

## 2021-02-08 PROCEDURE — 90837 PSYTX W PT 60 MINUTES: CPT | Performed by: SOCIAL WORKER

## 2021-02-08 SDOH — SOCIAL STABILITY - SOCIAL INSECURITY: PROBLEMS IN RELATIONSHIP WITH SPOUSE OR PARTNER: Z63.0

## 2021-02-09 NOTE — PROGRESS NOTES
Date of Service: February 8, 2021  Time In: 9:45 AM  Time Out: 10:45 AM      PROGRESS NOTE  Data:  Veronica Moore is a 25 y.o. female who met 1:1 with the undersigned for a regularly scheduled individual outpatient therapy session at his primary care of [place] for follow-up of alcohol dependence, depression, and PTSD.  Patient and the undersigned wore masks throughout the session and maintained appropriate distancing.       HPI: Patient states things have been very hectic as of late and states she was recently diagnosed with osteoarthritis.  Patient states she also has appointment with a neurologist to assess her tingling in her fingers and her legs and her periods of decreased verbal IQ and memory loss.  The patient states her appointment is not until May and states she would like to find a different provider as she does not think she will be able to wait that long.  Patient also reports she and her boyfriend, and the father of her son, continue to have difficulties and have agreed to begin sleeping in separate bedrooms.  The patient states she is fearful they will ultimately separate as he appears unable or unwilling to really work on their issues.  Patient also reports she continues to engage in her college classes and states she is apprised as to how calm she is taking these things but believes it is partly at least due to her feeling as though she is getting her own life in order.  The patient reports ongoing symptoms of PTSD including hypervigilance, increased all response, unwanted thoughts, and ongoing traumatic dreams.  Patient rates current symptoms of posttraumatic stress disorder at a 4 on a scale of 1-10 with 10 being most severe.    Patient states she feels as though her mood is relatively stable but states she feels as though she could be having slight hypomanic symptoms as she has had increased energy and has been more goal-directed.  However, she denies any problematic symptoms.  Patient  reports she continues to adhere to medication regimen as prescribed.    Patient adamantly convincingly denies suicidal ideation, intent, or plan.  Patient reports she is currently refraining from any use of alcohol but admits there are times she has thoughts of use.  Patient rates current triggers and thoughts of use at a 3 on a scale of 1-10 with 10 being most severe.    Clinical Maneuvering/Intervention:  Assisted patient in processing above session content; acknowledged and normalized patient’s thoughts, feelings, and concerns.  Allow the patient to discuss/process her feelings and frustrations with ongoing marital discord and validated her feelings.  Also utilized motivational interviewing techniques including complex reflections to discussed the concept of things we can control things he cannot control and strongly urged her to remind herself she cannot control the decisions or behaviors of others.  Further utilize solution focused therapy to assist the patient in recognizing she needs to put her health at the top of her priority list and also discussed the role of stress and health management and encouraged her to keep all her appointments and follow all recommendations.  Continue to utilize cognitive behavioral therapy to assist the patient in developing appropriate coping mechanisms to decrease the severity and frequency of symptoms and specifically assist with the patient in continuing to teach herself to become aware of her racing thoughts and irrational thoughts and challenging with factual counter arguments.  Continue to encourage the patient to abstain from any substance use including alcohol and to consider seeking on online self-help groups.  Provided unconditional positive regard in a safe, supportive environment.    Allowed patient to freely discuss issues without interruption or judgment. Provided safe, confidential environment to facilitate the development of positive therapeutic relationship and  encourage open, honest communication. Assisted patient in identifying risk factors which would indicate the need for higher level of care including thoughts to harm self or others and/or self-harming behavior and encouraged patient to contact this office, call 911, or present to the nearest emergency room should any of these events occur. Discussed crisis intervention services and means to access.  Patient adamantly and convincingly denies current suicidal or homicidal ideation or perceptual disturbance.    Assessment    Patient appears to continue to maintain relative stability as compared to her baseline.  However, she has a long history of mood instability and continues to struggle with significant relationship issues.  As a result, patient is at least susceptible to an external locus of control to some degree which makes her progress tenuous.  Patient symptomology is causing impairment in important areas of functioning.  As result, she can be reasonably expected to benefit from treatment and would likely be at increased risk for decompensation otherwise.  Diagnoses and all orders for this visit:    1. Post traumatic stress disorder (PTSD) (Primary)    2. Bipolar affective disorder, most recent episode mixed (CMS/[facility])    3. Alcohol dependence in sustained full remission (CMS/[facility])    4. Marital/partner relational problem               Mental Status Exam  Hygiene: Good  Dress: Casual  Attitude:  Cooperative  Motor Activity:  Appropriate  Speech:  Normal  Mood: Euthymic  Affect: Happy  Thought Processes:  Linear  Thought Content:  normal  Suicidal Thoughts:  denies  Homicidal Thoughts:  denies  Crisis Safety Plan: yes, to come to the emergency room.  Hallucinations:  denies    Patient's Support Network Includes:  parents and extended family    Progress toward goal: Not at goal    Functional Status: Moderate impairment     Prognosis: Fair with Ongoing Treatment       Plan         Patient will continue in individual  outpatient therapy session at [facility] in approximately 2 weeks.  Patient will continue with her primary care provider and follow all recommendations including adhering to medication regimen as prescribed.  Patient will continue in pharmacotherapy with prescriber and follow all recommendations including adhering to medication regimen as prescribed.   Patient will contact this office, call 911 or present to the nearest emergency room should suicidal or homicidal ideations occur. Provide Cognitive Behavioral Therapy and Integrative Therapy to improve functioning, maintain stability, and avoid decompensation and the need for higher level of care.          Return in about 2 weeks (around 2/22/2021) for Next scheduled follow up.      This document signed by Gordon Leiva LCSW, [facility] February 9, 2021 07:40 EST

## 2021-02-22 ENCOUNTER — OFFICE VISIT (OUTPATIENT)
Dept: PSYCHIATRY | Facility: CLINIC | Age: 26
End: 2021-02-22

## 2021-02-22 DIAGNOSIS — F31.60 BIPOLAR AFFECTIVE DISORDER, MOST RECENT EPISODE MIXED (HCC): Primary | ICD-10-CM

## 2021-02-22 DIAGNOSIS — F43.10 POST TRAUMATIC STRESS DISORDER (PTSD): ICD-10-CM

## 2021-02-22 DIAGNOSIS — Z63.0 MARITAL/PARTNER RELATIONAL PROBLEM: ICD-10-CM

## 2021-02-22 DIAGNOSIS — F10.21 ALCOHOL DEPENDENCE IN SUSTAINED FULL REMISSION (HCC): ICD-10-CM

## 2021-02-22 PROCEDURE — 90837 PSYTX W PT 60 MINUTES: CPT | Performed by: SOCIAL WORKER

## 2021-02-22 SDOH — SOCIAL STABILITY - SOCIAL INSECURITY: PROBLEMS IN RELATIONSHIP WITH SPOUSE OR PARTNER: Z63.0

## 2021-02-23 NOTE — PROGRESS NOTES
Date of Service: February 22, 2021  Time In: 1:30 PM  Time Out: 3:25 PM      PROGRESS NOTE  Data:  Veronica Moore is a 25 y.o. female who met 1:1 with the undersigned for a regularly scheduled individual outpatient therapy session at his primary care of [place] for follow-up of alcohol dependence, depression, and PTSD.  Patient and the undersigned wore masks throughout the session and maintained appropriate distancing.       HPI: Patient reports things been difficult as of late and states she simply has not been feeling well physically.  She also reports she realizes she is stressed out as she has 3 college classes and attempt to continue to care for her son while looking for a part-time job.  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  The patient reports ongoing symptoms of PTSD including hypervigilance, increased all response, unwanted thoughts, and ongoing traumatic dreams.  Patient rates current symptoms of posttraumatic stress disorder at a 4 on a scale of 1-10 with 10 being most severe.    Patient states she feels as though her bipolar affective disorder has been somewhat more in the depressive phase overlay as evidenced by depressed mood, anhedonia, anergia, periodic feelings of hopelessness and decreased motivation.  She rates current symptoms at a 4 on a scale of 1-10 with 10 being most severe.  However, she states she continues to believe they are relatively manageable.  Patient reports she continues to adhere to medication regimen as prescribed.    Patient adamantly convincingly denies suicidal ideation, intent, or plan.  Patient reports she is currently refraining from any use of alcohol but admits there are times she has thoughts of use.  Patient  rates current triggers and thoughts of use at a 2 on a scale of 1-10 with 10 being most severe.    Clinical Maneuvering/Intervention:  Assisted patient in processing above session content; acknowledged and normalized patient’s thoughts, feelings, and concerns.  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  Also utilized motivational interviewing techniques including complex reflections to assist the patient in recognizing she cannot control her past but can only attempt to make amends and continue to do the right thing.  Also encouraged the patient to consider discussing this with her instructors rather than waiting to the last minute and simply asking them how she can correct the situation.  Also offered to provide a letter concerning the fact she remains in treatment and is doing relatively well.  Continue to utilize cognitive behavioral therapy to assist the patient in developing appropriate coping mechanisms decrease in severity and frequency of symptoms.  Also continue to focus on healthy skills of daily living and behavioral activation.  Provided unconditional positive regard in a safe, supportive environment.    Allowed patient to freely discuss issues without interruption or judgment. Provided safe, confidential environment to facilitate the development of positive therapeutic relationship and encourage open, honest communication. Assisted patient in identifying risk factors which would indicate the need for higher level of care including thoughts to harm self or others and/or self-harming behavior and encouraged patient to contact this office, call 911, or present to the nearest emergency room should any of these events occur. Discussed crisis intervention services and means to access.  Patient adamantly and convincingly denies current suicidal or homicidal ideation or perceptual  disturbance.    Assessment    Patient appears to continue to maintain relative stability as compared to her baseline.  However, the patient is struggling with stress due to her college classes and the fact that her background trait could cause her problems.  Patient symptomology is causing impairment in important areas of functioning.  As result, she can be reasonably expected to benefit from treatment and would likely be at increased risk for decompensation otherwise.  Diagnoses and all orders for this visit:    1. Bipolar affective disorder, most recent episode mixed (CMS/HCC) (Primary)    2. Post traumatic stress disorder (PTSD)    3. Alcohol dependence in sustained full remission (CMS/HCC)    4. Marital/partner relational problem               Mental Status Exam  Hygiene: Good  Dress: Casual  Attitude:  Cooperative  Motor Activity:  Appropriate  Speech:  Normal  Mood: Euthymic  Affect: Happy  Thought Processes:  Linear  Thought Content:  normal  Suicidal Thoughts:  denies  Homicidal Thoughts:  denies  Crisis Safety Plan: yes, to come to the emergency room.  Hallucinations:  denies    Patient's Support Network Includes:  parents and extended family    Progress toward goal: Not at goal    Functional Status: Moderate impairment     Prognosis: Fair with Ongoing Treatment       Plan         Patient will continue in individual outpatient therapy session at [facility] in approximately 2 weeks.  Patient will continue with her primary care provider and follow all recommendations including adhering to medication regimen as prescribed.  Patient will continue in pharmacotherapy with prescriber and follow all recommendations including adhering to medication regimen as prescribed.   Patient will contact this office, call 911 or present to the nearest emergency room should suicidal or homicidal ideations occur. Provide Cognitive Behavioral Therapy and Integrative Therapy to improve functioning,  maintain stability, and avoid decompensation and the need for higher level of care.          Return in about 3 weeks (around 3/15/2021) for Next scheduled follow up.      This document signed by Gordon Leiva LCSW, [facility] February 23, 2021 12:27 EST

## 2021-03-08 ENCOUNTER — OFFICE VISIT (OUTPATIENT)
Dept: PSYCHIATRY | Facility: CLINIC | Age: 26
End: 2021-03-08

## 2021-03-08 DIAGNOSIS — F10.21 ALCOHOL DEPENDENCE IN SUSTAINED FULL REMISSION (HCC): ICD-10-CM

## 2021-03-08 DIAGNOSIS — F31.60 BIPOLAR AFFECTIVE DISORDER, MOST RECENT EPISODE MIXED (HCC): Primary | ICD-10-CM

## 2021-03-08 DIAGNOSIS — Z63.0 MARITAL/PARTNER RELATIONAL PROBLEM: ICD-10-CM

## 2021-03-08 DIAGNOSIS — F43.10 POST TRAUMATIC STRESS DISORDER (PTSD): ICD-10-CM

## 2021-03-08 PROCEDURE — 90834 PSYTX W PT 45 MINUTES: CPT | Performed by: SOCIAL WORKER

## 2021-03-08 RX ORDER — PRAZOSIN HYDROCHLORIDE 1 MG/1
3 CAPSULE ORAL NIGHTLY
COMMUNITY

## 2021-03-08 RX ORDER — LITHIUM CARBONATE 300 MG
450 TABLET ORAL 2 TIMES DAILY
COMMUNITY

## 2021-03-08 SDOH — SOCIAL STABILITY - SOCIAL INSECURITY: PROBLEMS IN RELATIONSHIP WITH SPOUSE OR PARTNER: Z63.0

## 2021-03-09 NOTE — PROGRESS NOTES
"Date of Service: March 8, 2021  Time In: 3:15 PM  Time Out: 4:00 PM      PROGRESS NOTE  Data:  Veronica Moore is a 25 y.o. female who met 1:1 with the undersigned for a regularly scheduled individual outpatient therapy session at his primary care of [place] for follow-up of alcohol dependence, depression, and PTSD.  Patient and the undersigned wore masks throughout the session and maintained appropriate distancing.       HPI: Patient reports past couple of days of been very difficult as she has gotten out of her lithium and states she continues to await her provider standing in her refill.  Patient states she feels as though she is \"buzzing\" and states she feels very shaky.  Patient also reports what she describes as \"burning in her chest\".  Patient states her anxiety is increased significantly and states she has a distinct fear of what can happen if she is not able to get her medication.  The patient reports ongoing symptoms of PTSD including hypervigilance, increased all response, unwanted thoughts, and ongoing traumatic dreams.  Patient rates current symptoms of posttraumatic stress disorder at a 4 on a scale of 1-10 with 10 being most severe.      Patient reports she continues to adhere to medication regimen as prescribed.    Patient adamantly convincingly denies suicidal ideation, intent, or plan.  Patient reports she is currently refraining from any use of alcohol but admits there are times she has thoughts of use.  Patient rates current triggers and thoughts of use at a 2 on a scale of 1-10 with 10 being most severe.    Clinical Maneuvering/Intervention:  Assisted patient in processing above session content; acknowledged and normalized patient’s thoughts, feelings, and concerns.  This session was primarily focused on addressing the patient's immediate concerns of being without her lithium and her resulting symptomology.  Utilize solution focused therapy to assist the patient in developing a strategy to " address this issue and encouraged her to contact the pharmacy to see whether or not her prescription has been called in and if not to ask for an emergency short-term supply.  Patient contacted the pharmacy who had not received her refill but agreed to give her a 3-day supply until her refill is called in.  Also utilized solution focused therapy to assist the patient in developing a strategy going forward to avoid this issue and encouraged her to be sure to contact her provider at least 3 to 5 days prior to running out of her medications.  Also continue to focus on healthy skills of daily living and behavioral activation.  Provided unconditional positive regard in a safe, supportive environment.    Allowed patient to freely discuss issues without interruption or judgment. Provided safe, confidential environment to facilitate the development of positive therapeutic relationship and encourage open, honest communication. Assisted patient in identifying risk factors which would indicate the need for higher level of care including thoughts to harm self or others and/or self-harming behavior and encouraged patient to contact this office, call 911, or present to the nearest emergency room should any of these events occur. Discussed crisis intervention services and means to access.  Patient adamantly and convincingly denies current suicidal or homicidal ideation or perceptual disturbance.    Assessment    Patient appears to continue to maintain relative stability as compared to her baseline.  However, the patient is struggling with stress due to her college classes and the fact that her background trait could cause her problems.  Patient symptomology is causing impairment in important areas of functioning.  As result, she can be reasonably expected to benefit from treatment and would likely be at increased risk for decompensation otherwise.  Diagnoses and all orders for this visit:    1. Bipolar affective disorder, most  recent episode mixed (CMS/HCC) (Primary)    2. Post traumatic stress disorder (PTSD)    3. Alcohol dependence in sustained full remission (CMS/HCC)    4. Marital/partner relational problem               Mental Status Exam  Hygiene: Good  Dress: Casual  Attitude:  Cooperative  Motor Activity:  Appropriate  Speech:  Normal  Mood: Euthymic  Affect: Happy  Thought Processes:  Linear  Thought Content:  normal  Suicidal Thoughts:  denies  Homicidal Thoughts:  denies  Crisis Safety Plan: yes, to come to the emergency room.  Hallucinations:  denies    Patient's Support Network Includes:  parents and extended family    Progress toward goal: Not at goal    Functional Status: Moderate impairment     Prognosis: Fair with Ongoing Treatment       Plan         Patient will continue in individual outpatient therapy session at [facility] in approximately 2 weeks.  Patient will continue with her primary care provider and follow all recommendations including adhering to medication regimen as prescribed.  Patient will continue in pharmacotherapy with prescriber and follow all recommendations including adhering to medication regimen as prescribed.   Patient will contact this office, call 911 or present to the nearest emergency room should suicidal or homicidal ideations occur. Provide Cognitive Behavioral Therapy and Integrative Therapy to improve functioning, maintain stability, and avoid decompensation and the need for higher level of care.          Return in about 3 weeks (around 3/29/2021) for Next scheduled follow up.      This document signed by Gordon Leiva LCSW, JONATHAN March 9, 2021 07:14 EST

## 2021-04-19 ENCOUNTER — OFFICE VISIT (OUTPATIENT)
Dept: PSYCHIATRY | Facility: CLINIC | Age: 26
End: 2021-04-19

## 2021-04-19 DIAGNOSIS — F10.21 ALCOHOL DEPENDENCE IN SUSTAINED FULL REMISSION (HCC): ICD-10-CM

## 2021-04-19 DIAGNOSIS — F43.10 POST TRAUMATIC STRESS DISORDER (PTSD): ICD-10-CM

## 2021-04-19 DIAGNOSIS — F31.60 BIPOLAR AFFECTIVE DISORDER, MOST RECENT EPISODE MIXED (HCC): Primary | ICD-10-CM

## 2021-04-19 PROCEDURE — 90837 PSYTX W PT 60 MINUTES: CPT | Performed by: SOCIAL WORKER

## 2021-04-26 NOTE — PROGRESS NOTES
"Date of Service: April 19, 2021  Time In: 1:45 PM  Time Out: 2:45 PM      PROGRESS NOTE  Data:  Veronica Moore is a 25 y.o. female who met 1:1 with the undersigned for a regularly scheduled individual outpatient therapy session at his primary care of [place] for follow-up of alcohol dependence, depression, and PTSD.  Patient and the undersigned wore masks throughout the session and maintained appropriate distancing.       HPI: Patient states she was finally able to have a discussion concerning the relationship with her son's father and states it was revealed that he has been cheating on her for some time and they agreed to separate.  Patient states she is feeling much better physically and emotionally and states she is excited for the future.  In addition, she states her background check came back \"clean\" concerning her getting into college program and states this made her very happy.  However, she states he continues to be frustrated with the fact that her court case appears to be continued indefinitely and states that she would like to have it resolved.  Patient also reports she is struggling at this time due to the fact her estranged boyfriend now appears to be attempted to contact her on a regular basis.  Patient does states she has began \"talking to someone\".  The patient reports ongoing symptoms of PTSD including hypervigilance, increased all response, unwanted thoughts, and ongoing traumatic dreams.  Patient rates current symptoms of posttraumatic stress disorder at a 4 on a scale of 1-10 with 10 being most severe.      Patient reports she continues to adhere to medication regimen as prescribed.    Patient adamantly convincingly denies suicidal ideation, intent, or plan.  Patient reports she is currently refraining from any use of alcohol but admits there are times she has thoughts of use.  Patient rates current triggers and thoughts of use at a 1 on a scale of 1-10 with 10 being most " "severe.    Clinical Maneuvering/Intervention:  Assisted patient in processing above session content; acknowledged and normalized patient’s thoughts, feelings, and concerns.  Praised the patient for her ability to address her concerns and to take what were difficult steps to bring about positive change.  Also discussed the concept of emotional distance and encouraged patient to consider if she is not going to be with her son's father patient keeps her contacting communication strictly focused on things concerning their son which will give the patient time to \"move on\".  Continue to utilize cognitive behavioral therapy to assist patient in developing appropriate coping mechanisms to decrease the severity and frequency of symptoms.  Also validated the patient's happiness that being accepted into college and praised her effort.  Provided unconditional positive regard in a safe, supportive environment.    Allowed patient to freely discuss issues without interruption or judgment. Provided safe, confidential environment to facilitate the development of positive therapeutic relationship and encourage open, honest communication. Assisted patient in identifying risk factors which would indicate the need for higher level of care including thoughts to harm self or others and/or self-harming behavior and encouraged patient to contact this office, call 911, or present to the nearest emergency room should any of these events occur. Discussed crisis intervention services and means to access.  Patient adamantly and convincingly denies current suicidal or homicidal ideation or perceptual disturbance.    Assessment    Patient appears to continue to maintain relative stability as compared to her baseline.  However, she did struggle with recent break-up from her boyfriend but appears to be making positive change.  Patient symptomology is causing impairment in important areas of functioning.  As result, she can be reasonably expected to " benefit from treatment and would likely be at increased risk for decompensation otherwise.  Diagnoses and all orders for this visit:    1. Bipolar affective disorder, most recent episode mixed (CMS/HCC) (Primary)    2. Post traumatic stress disorder (PTSD)    3. Alcohol dependence in sustained full remission (CMS/HCC)               Mental Status Exam  Hygiene: Good  Dress: Casual  Attitude:  Cooperative  Motor Activity:  Appropriate  Speech:  Normal  Mood: Euthymic  Affect: Happy  Thought Processes:  Linear  Thought Content:  normal  Suicidal Thoughts:  denies  Homicidal Thoughts:  denies  Crisis Safety Plan: yes, to come to the emergency room.  Hallucinations:  denies    Patient's Support Network Includes:  parents and extended family    Progress toward goal: Not at goal    Functional Status: Moderate impairment     Prognosis: Fair with Ongoing Treatment       Plan         Patient will continue in individual outpatient therapy session at [facility] in approximately 2 weeks.  Patient will continue with her primary care provider and follow all recommendations including adhering to medication regimen as prescribed.  Patient will continue in pharmacotherapy with prescriber and follow all recommendations including adhering to medication regimen as prescribed.   Patient will contact this office, call 911 or present to the nearest emergency room should suicidal or homicidal ideations occur. Provide Cognitive Behavioral Therapy and Integrative Therapy to improve functioning, maintain stability, and avoid decompensation and the need for higher level of care.          Return in about 3 weeks (around 5/10/2021) for Next scheduled follow up.      This document signed by Gordon Leiva LCSW, JONATHAN April 26, 2021 12:15 EDT

## 2021-06-01 ENCOUNTER — HOSPITAL ENCOUNTER (OUTPATIENT)
Dept: HOSPITAL 79 - EMI | Age: 26
End: 2021-06-01
Attending: PSYCHIATRY & NEUROLOGY
Payer: COMMERCIAL

## 2021-06-01 DIAGNOSIS — G35: Primary | ICD-10-CM

## 2021-06-01 PROCEDURE — A9577 INJ MULTIHANCE: HCPCS

## 2021-06-03 ENCOUNTER — OFFICE VISIT (OUTPATIENT)
Dept: PSYCHIATRY | Facility: CLINIC | Age: 26
End: 2021-06-03

## 2021-06-03 DIAGNOSIS — F10.21 ALCOHOL DEPENDENCE IN SUSTAINED FULL REMISSION (HCC): ICD-10-CM

## 2021-06-03 DIAGNOSIS — F43.10 POST TRAUMATIC STRESS DISORDER (PTSD): ICD-10-CM

## 2021-06-03 DIAGNOSIS — F31.60 BIPOLAR AFFECTIVE DISORDER, MOST RECENT EPISODE MIXED (HCC): Primary | ICD-10-CM

## 2021-06-03 PROCEDURE — 90834 PSYTX W PT 45 MINUTES: CPT | Performed by: SOCIAL WORKER

## 2021-06-08 NOTE — PROGRESS NOTES
Date of Service: Lucie 3, 2021  Time In: 1:15 PM  Time Out: 2:00 PM      PROGRESS NOTE  Data:  Veronica Moore is a 25 y.o. female who met 1:1 with the undersigned for a regularly scheduled individual outpatient therapy session at his primary care of [place] for follow-up of alcohol dependence, depression, and PTSD.  Patient and the undersigned wore masks throughout the session and maintained appropriate distancing.       HPI: Patient reports that she is feeling relatively stable psychologically but states she has not felt well in the past 2 days.  Patient states she is struggled running a fever and having respiratory symptoms.  Patient states she has had a negative rapid COVID test but states that she continues to be concerned and plans to be retested.  She also states she has been fully vaccinated for several months. The patient reports ongoing symptoms of PTSD including hypervigilance, increased all response, unwanted thoughts, and ongoing traumatic dreams.  Patient rates current symptoms of posttraumatic stress disorder at a 4 on a scale of 1-10 with 10 being most severe.      Patient reports she continues to adhere to medication regimen as prescribed.    Patient adamantly convincingly denies suicidal ideation, intent, or plan.  Patient reports she is currently refraining from any use of alcohol but admits there are times she has thoughts of use.  Patient rates current triggers and thoughts of use at a 1 on a scale of 1-10 with 10 being most severe.    Clinical Maneuvering/Intervention:  Assisted patient in processing above session content; acknowledged and normalized patient’s thoughts, feelings, and concerns.  Praised the patient for her progress and validated her efforts.  Also utilize solution focused therapy to assist the patient in developing appropriate strategy to deal with her current respiratory symptoms and fever and encouraged her to contact her primary care provider and to seek further COVID  testing.  Continue to utilize cognitive behavioral therapy to assist patient in developing appropriate coping mechanisms decrease in severity and frequency of symptoms.  Also validated the patient's happiness that being accepted into college and praised her effort.  Provided unconditional positive regard in a safe, supportive environment.    Allowed patient to freely discuss issues without interruption or judgment. Provided safe, confidential environment to facilitate the development of positive therapeutic relationship and encourage open, honest communication. Assisted patient in identifying risk factors which would indicate the need for higher level of care including thoughts to harm self or others and/or self-harming behavior and encouraged patient to contact this office, call 911, or present to the nearest emergency room should any of these events occur. Discussed crisis intervention services and means to access.  Patient adamantly and convincingly denies current suicidal or homicidal ideation or perceptual disturbance.    Assessment    Patient appears to continue to maintain relative stability as compared to her baseline.  However, the patient appears to be struggling with respiratory symptoms and running a fever which is impacting her mental stability.  Patient symptomology is causing impairment in important areas of functioning.  As result, she can be reasonably expected to benefit from treatment and would likely be at increased risk for decompensation otherwise.  Diagnoses and all orders for this visit:    1. Bipolar affective disorder, most recent episode mixed (CMS/HCC) (Primary)    2. Post traumatic stress disorder (PTSD)    3. Alcohol dependence in sustained full remission (CMS/[facility])               Mental Status Exam  Hygiene: Good  Dress: Casual  Attitude:  Cooperative  Motor Activity:  Appropriate  Speech:  Normal  Mood: Euthymic  Affect: Happy  Thought Processes:  Linear  Thought Content:   normal  Suicidal Thoughts:  denies  Homicidal Thoughts:  denies  Crisis Safety Plan: yes, to come to the emergency room.  Hallucinations:  denies    Patient's Support Network Includes:  parents and extended family    Progress toward goal: Not at goal    Functional Status: Moderate impairment     Prognosis: Fair with Ongoing Treatment       Plan         Patient will continue in individual outpatient therapy session at [facility] [place] [place] in approximately 2 weeks.  Patient will continue with her primary care provider and follow all recommendations including adhering to medication regimen as prescribed.  Patient will seek further testing for COVID and follow all recommendations.  Patient will continue in pharmacotherapy with prescriber and follow all recommendations including adhering to medication regimen as prescribed.   Patient will contact this office, call 911 or present to the nearest emergency room should suicidal or homicidal ideations occur. Provide Cognitive Behavioral Therapy and Integrative Therapy to improve functioning, maintain stability, and avoid decompensation and the need for higher level of care.          Return in about 3 weeks (around 6/24/2021) for Next scheduled follow up.      This document signed by Gordon Leiva LCSW, JONATHAN June 8, 2021 12:04 EDT

## 2021-08-05 ENCOUNTER — OFFICE VISIT (OUTPATIENT)
Dept: PSYCHIATRY | Facility: CLINIC | Age: 26
End: 2021-08-05

## 2021-08-05 DIAGNOSIS — F43.10 POST TRAUMATIC STRESS DISORDER (PTSD): ICD-10-CM

## 2021-08-05 DIAGNOSIS — F31.60 BIPOLAR AFFECTIVE DISORDER, MOST RECENT EPISODE MIXED (HCC): Primary | ICD-10-CM

## 2021-08-05 DIAGNOSIS — F10.21 ALCOHOL DEPENDENCE IN SUSTAINED FULL REMISSION (HCC): ICD-10-CM

## 2021-08-05 PROCEDURE — 90834 PSYTX W PT 45 MINUTES: CPT | Performed by: SOCIAL WORKER

## 2021-08-11 NOTE — PROGRESS NOTES
"Date of Service: August 5, 2021  Time In: 2:45 PM  Time Out: 3:30 PM      PROGRESS NOTE  Data:  Veronica Moore is a 25 y.o. female who met 1:1 with the undersigned for a regularly scheduled individual outpatient therapy session at his primary care of [place] for follow-up of alcohol dependence, depression, and PTSD.  Patient and the undersigned wore masks throughout the session and maintained appropriate distancing.       HPI: Patient states she has had difficulty in the past several weeks from relationship issues and states she recently discovered someone was dating her solely for the color of her skin.  Patient states this was very disturbing and states she immediately ended their relationship.  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  Patient reports she continues to live in the home with her son's grandmother and her estranged boyfriend and the father of her son.  Patient states she realizes she needs to get her own place and move on and states she is making plans for just that.  The patient reports ongoing symptoms of PTSD including hypervigilance, increased all response, unwanted thoughts, and ongoing traumatic dreams.  Patient rates current symptoms of posttraumatic stress disorder at a 4 on a scale of 1-10 with 10 being most severe.      Patient reports she continues to adhere to medication regimen as prescribed.    Patient adamantly convincingly denies suicidal ideation, intent, or plan.  Patient reports she is currently refraining from any use of alcohol but admits there are times she has thoughts of use.  Patient rates current triggers and thoughts of use at a 1 on a scale of 1-10 with 10 being most severe.    Clinical Maneuvering/Intervention:  Assisted patient in processing above session content; acknowledged and normalized patient’s thoughts, feelings, and " concerns.  Utilized motivational interviewing techniques including complex reflections to assist the patient in recognizing the fact she has been able to identify being treated poorly or being objective finding immediately left the relationship and praise her progress and ability to advocate for herself.  Also encouraged the patient to take her time during relationships.  Continue to utilize cognitive behavioral therapy to assist the patient in developing appropriate coping mechanisms decrease in severity and frequency of symptoms.  Provided unconditional positive regard in a safe, supportive environment.    Allowed patient to freely discuss issues without interruption or judgment. Provided safe, confidential environment to facilitate the development of positive therapeutic relationship and encourage open, honest communication. Assisted patient in identifying risk factors which would indicate the need for higher level of care including thoughts to harm self or others and/or self-harming behavior and encouraged patient to contact this office, call 911, or present to the nearest emergency room should any of these events occur. Discussed crisis intervention services and means to access.  Patient adamantly and convincingly denies current suicidal or homicidal ideation or perceptual disturbance.    Assessment    Patient appears to continue to maintain relative stability as compared to her baseline.  However, the patient appears to be struggling with respiratory symptoms and running a fever which is impacting her mental stability.  Patient symptomology is causing impairment in important areas of functioning.  As result, she can be reasonably expected to benefit from treatment and would likely be at increased risk for decompensation otherwise.  Diagnoses and all orders for this visit:    1. Bipolar affective disorder, most recent episode mixed (CMS/HCC) (Primary)    2. Post traumatic stress disorder (PTSD)    3. Alcohol  dependence in sustained full remission (CMS/HCC)               Mental Status Exam  Hygiene: Good  Dress: Casual  Attitude:  Cooperative  Motor Activity:  Appropriate  Speech:  Normal  Mood: Euthymic  Affect: Happy  Thought Processes:  Linear  Thought Content:  normal  Suicidal Thoughts:  denies  Homicidal Thoughts:  denies  Crisis Safety Plan: yes, to come to the emergency room.  Hallucinations:  denies    Patient's Support Network Includes:  parents and extended family    Progress toward goal: Not at goal    Functional Status: Moderate impairment     Prognosis: Fair with Ongoing Treatment       Plan         Patient will continue in individual outpatient therapy session at [facility] in approximately 2 weeks.  Patient will continue with her primary care provider and follow all recommendations including adhering to medication regimen as prescribed.  Patient will seek further testing for COVID and follow all recommendations.  Patient will continue in pharmacotherapy with prescriber and follow all recommendations including adhering to medication regimen as prescribed.   Patient will contact this office, call 911 or present to the nearest emergency room should suicidal or homicidal ideations occur. Provide Cognitive Behavioral Therapy and Integrative Therapy to improve functioning, maintain stability, and avoid decompensation and the need for higher level of care.          Return in about 3 weeks (around 8/26/2021) for Next scheduled follow up.      This document signed by Gordon Leiva LCSW, JONATHAN August 11, 2021 07:16 EDT

## 2021-09-20 ENCOUNTER — OFFICE VISIT (OUTPATIENT)
Dept: PSYCHIATRY | Facility: CLINIC | Age: 26
End: 2021-09-20

## 2021-09-20 DIAGNOSIS — F43.10 POST TRAUMATIC STRESS DISORDER (PTSD): ICD-10-CM

## 2021-09-20 DIAGNOSIS — F31.60 BIPOLAR AFFECTIVE DISORDER, MOST RECENT EPISODE MIXED (HCC): Primary | ICD-10-CM

## 2021-09-20 DIAGNOSIS — F10.21 ALCOHOL DEPENDENCE IN SUSTAINED FULL REMISSION (HCC): ICD-10-CM

## 2021-09-20 PROCEDURE — 90837 PSYTX W PT 60 MINUTES: CPT | Performed by: SOCIAL WORKER

## 2021-09-22 NOTE — PROGRESS NOTES
Date of Service: September 20, 2021  Time In: 12:45 PM  Time Out: 2:00 PM      PROGRESS NOTE  Data:  Veronica Moore is a 26 y.o. female who met 1:1 with the undersigned for a regularly scheduled individual outpatient therapy session at his primary care of [place] for follow-up of alcohol dependence, depression, and PTSD.  Patient and the undersigned wore masks throughout the session and maintained appropriate distancing.       HPI: Patient immediately reports multiple members of her family member recently had COVID-19 including her parents where she is living.  The patient states she was very fearful for herself but especially for her young son and states that she isolated herself for approximately 2 weeks and had her son stay with his father which resulted in her not seeing him for 2 weeks.  Patient states this is caused her to have a sense of fear and uncertainty and states she has been struggling to cope.   The patient reports ongoing symptoms of PTSD including hypervigilance, increased all response, unwanted thoughts, and ongoing traumatic dreams.  Patient rates current symptoms of posttraumatic stress disorder at a 4 on a scale of 1-10 with 10 being most severe.    Patient reports she feels as though her mood is relatively stable as of late other than the above symptoms and denies any sustained periods of claudia.  Patient reports she continues to adhere to medication regimen as prescribed.    Patient adamantly convincingly denies suicidal ideation, intent, or plan.  Patient reports she is currently refraining from any use of alcohol but admits there are times she has thoughts of use.  Patient rates current triggers and thoughts of use at a 1 on a scale of 1-10 with 10 being most severe.    Clinical Maneuvering/Intervention:  Assisted patient in processing above session content; acknowledged and normalized patient’s thoughts, feelings, and concerns.  Allow the patient to discuss/process her feelings and  fears concerning recent cases of COVID within her immediate family and validated her feelings.  Also utilized motivational interviewing techniques including complex reflections to discussed concept of things we can control things we cannot control strongly urged patient to remind herself she cannot control her decisions or behaviors of others and can only take appropriate precautions for her and her son.  Continue to utilize cognitive behavioral therapy to assist the patient in developing appropriate coping mechanisms to decrease the severity and frequency of symptoms.  Provided unconditional positive regard in a safe, supportive environment.    Allowed patient to freely discuss issues without interruption or judgment. Provided safe, confidential environment to facilitate the development of positive therapeutic relationship and encourage open, honest communication. Assisted patient in identifying risk factors which would indicate the need for higher level of care including thoughts to harm self or others and/or self-harming behavior and encouraged patient to contact this office, call 911, or present to the nearest emergency room should any of these events occur. Discussed crisis intervention services and means to access.  Patient adamantly and convincingly denies current suicidal or homicidal ideation or perceptual disturbance.    Assessment    Patient appears to continue to maintain relative stability as compared to her baseline.  However, the patient appears to be struggling with respiratory symptoms and running a fever which is impacting her mental stability.  Patient symptomology is causing impairment in important areas of functioning.  As result, she can be reasonably expected to benefit from treatment and would likely be at increased risk for decompensation otherwise.  Diagnoses and all orders for this visit:    1. Bipolar affective disorder, most recent episode mixed (CMS/HCC) (Primary)    2. Post traumatic  stress disorder (PTSD)    3. Alcohol dependence in sustained full remission (CMS/[facility])               Mental Status Exam  Hygiene: Good  Dress: Casual  Attitude:  Cooperative  Motor Activity:  Appropriate  Speech:  Normal  Mood: Euthymic  Affect: Happy  Thought Processes:  Linear  Thought Content:  normal  Suicidal Thoughts:  denies  Homicidal Thoughts:  denies  Crisis Safety Plan: yes, to come to the emergency room.  Hallucinations:  denies    Patient's Support Network Includes:  parents and extended family    Progress toward goal: Not at goal    Functional Status: Moderate impairment     Prognosis: Fair with Ongoing Treatment       Plan         Patient will continue in individual outpatient therapy session at [facility] in approximately 2 weeks.  Patient will continue with her primary care provider and follow all recommendations including adhering to medication regimen as prescribed.  Patient will seek further testing for COVID and follow all recommendations.  Patient will continue in pharmacotherapy with prescriber and follow all recommendations including adhering to medication regimen as prescribed.   Patient will contact this office, call 911 or present to the nearest emergency room should suicidal or homicidal ideations occur. Provide Cognitive Behavioral Therapy and Integrative Therapy to improve functioning, maintain stability, and avoid decompensation and the need for higher level of care.          Return in about 3 weeks (around 10/11/2021) for Next scheduled follow up.      This document signed by Gordon Leiva LCSW, JONATHAN September 22, 2021 07:08 EDT

## 2021-10-18 ENCOUNTER — OFFICE VISIT (OUTPATIENT)
Dept: PSYCHIATRY | Facility: CLINIC | Age: 26
End: 2021-10-18

## 2021-10-18 DIAGNOSIS — F43.10 POST TRAUMATIC STRESS DISORDER (PTSD): ICD-10-CM

## 2021-10-18 DIAGNOSIS — F10.21 ALCOHOL DEPENDENCE IN SUSTAINED FULL REMISSION (HCC): ICD-10-CM

## 2021-10-18 DIAGNOSIS — F31.60 BIPOLAR AFFECTIVE DISORDER, MOST RECENT EPISODE MIXED (HCC): Primary | ICD-10-CM

## 2021-10-18 PROCEDURE — 90837 PSYTX W PT 60 MINUTES: CPT | Performed by: SOCIAL WORKER

## 2021-10-19 NOTE — PROGRESS NOTES
Date of Service: October 18, 2021  Time In: 3:00 PM  Time Out: 4:00 PM      PROGRESS NOTE  Data:  Veronica Moore is a 26 y.o. female who met 1:1 with the undersigned for a regularly scheduled individual outpatient therapy session at his primary care of [place] for follow-up of alcohol dependence, depression, and PTSD.  Patient and the undersigned wore masks throughout the session and maintained appropriate distancing.       HPI: Patient reports she feels as though everyone in her family has recovered from recently having COVID-19 but states she feels as though her symptoms of anxiety and feeling on edge have been increased since that time.  As result, she states her psychiatrist increased her lithium from 300 to 450 mg.  The patient states she is simply had more of a sense of uncertainty and a feeling of vulnerability and states she has worried more about her young son.   The patient reports ongoing symptoms of PTSD including hypervigilance, increased all response, unwanted thoughts, and ongoing traumatic dreams.  Patient rates current symptoms of posttraumatic stress disorder at a 4 on a scale of 1-10 with 10 being most severe.    Patient reports she feels as though her mood is relatively stable as of late other than the above symptoms and denies any sustained periods of claudia.  Patient reports she continues to adhere to medication regimen as prescribed.    Patient adamantly convincingly denies suicidal ideation, intent, or plan.  Patient reports she is currently refraining from any use of alcohol but admits there are times she has thoughts of use.  Patient rates current triggers and thoughts of use at a 1 on a scale of 1-10 with 10 being most severe.    Clinical Maneuvering/Intervention:  Assisted patient in processing above session content; acknowledged and normalized patient’s thoughts, feelings, and concerns.  Allow the patient to discuss/process her feelings and ongoing fears concerning recently having  COVID-19 and having difficulty with feeling of vulnerable and afraid.  Also utilized motivational interviewing techniques including complex reflections to discussed concept of things we can control things we cannot control strongly urged the patient to remind herself all she can do is take appropriate precautions and be as careful as possible.  Further continue to utilize cog behavioral therapy to assist patient in developing appropriate coping mechanisms to decrease the severity and frequency of symptoms.  Continue to focus on healthy skills of daily living and behavioral activation.  Provided unconditional positive regard in a safe, supportive environment.    Allowed patient to freely discuss issues without interruption or judgment. Provided safe, confidential environment to facilitate the development of positive therapeutic relationship and encourage open, honest communication. Assisted patient in identifying risk factors which would indicate the need for higher level of care including thoughts to harm self or others and/or self-harming behavior and encouraged patient to contact this office, call 911, or present to the nearest emergency room should any of these events occur. Discussed crisis intervention services and means to access.  Patient adamantly and convincingly denies current suicidal or homicidal ideation or perceptual disturbance.    Assessment    Patient appears to continue to maintain relative stability as compared to her baseline.   Patient symptomology is causing impairment in important areas of functioning.  As result, she can be reasonably expected to benefit from treatment and would likely be at increased risk for decompensation otherwise.  Diagnoses and all orders for this visit:    1. Bipolar affective disorder, most recent episode mixed (HCC) (Primary)    2. Post traumatic stress disorder (PTSD)    3. Alcohol dependence in sustained full remission (HCC)               Mental Status  Exam  Hygiene: Good  Dress: Casual  Attitude:  Cooperative  Motor Activity:  Appropriate  Speech:  Normal  Mood: Euthymic  Affect: Happy  Thought Processes:  Linear  Thought Content:  normal  Suicidal Thoughts:  denies  Homicidal Thoughts:  denies  Crisis Safety Plan: yes, to come to the emergency room.  Hallucinations:  denies    Patient's Support Network Includes:  parents and extended family    Progress toward goal: Not at goal    Functional Status: Moderate impairment     Prognosis: Fair with Ongoing Treatment       Plan         Patient will continue in individual outpatient therapy session at [facility] in approximately 2 weeks.  Patient will continue with her primary care provider and follow all recommendations including adhering to medication regimen as prescribed.  Patient will seek further testing for COVID and follow all recommendations.  Patient will continue in pharmacotherapy with prescriber and follow all recommendations including adhering to medication regimen as prescribed.   Patient will contact this office, call 911 or present to the nearest emergency room should suicidal or homicidal ideations occur. Provide Cognitive Behavioral Therapy and Integrative Therapy to improve functioning, maintain stability, and avoid decompensation and the need for higher level of care.          Return in about 2 weeks (around 11/1/2021) for Next scheduled follow up.      This document signed by Gordon Leiva LCSW, [facility] October 19, 2021 07:59 EDT

## 2022-02-07 ENCOUNTER — OFFICE VISIT (OUTPATIENT)
Dept: PSYCHIATRY | Facility: CLINIC | Age: 27
End: 2022-02-07

## 2022-02-07 DIAGNOSIS — F10.21 ALCOHOL DEPENDENCE IN SUSTAINED FULL REMISSION: ICD-10-CM

## 2022-02-07 DIAGNOSIS — F43.10 POST TRAUMATIC STRESS DISORDER (PTSD): ICD-10-CM

## 2022-02-07 DIAGNOSIS — F31.60 BIPOLAR AFFECTIVE DISORDER, MOST RECENT EPISODE MIXED: Primary | ICD-10-CM

## 2022-02-07 PROCEDURE — 90837 PSYTX W PT 60 MINUTES: CPT | Performed by: SOCIAL WORKER

## 2022-02-08 NOTE — PROGRESS NOTES
Date of Service: February 7, 2022  Time In: 1:45 PM  Time Out: 2:50 PM      PROGRESS NOTE  Data:  Veronica Moore is a 26 y.o. female who met 1:1 with the undersigned for a regularly scheduled individual outpatient therapy session at his primary care of [place] for follow-up of alcohol dependence, depression, and PTSD.  Patient and the undersigned wore masks throughout the session and maintained appropriate distancing.       HPI: Patient states she recently played guilty to a misdemeanor charge of assault and a DUI and states after 4 years she has been ordered to serve 48 hours in residential.  The patient states she does not mind following through with her requirements, however, she states her concern is that they will not give her her lithium while she is incarcerated which she fears will have a significant impact on her mood stability.  Patient also reports she continues to struggle with significant anxiety concerning COVID and states her father recently only recovered and states she also believes she recently had COVID for the second time.  Patient states she has significant concern for her 2-year-old son and states she is not sure how to approach this with family members who refused to get vaccinated.  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  The patient reports ongoing symptoms of PTSD including hypervigilance, increased all response, unwanted thoughts, and ongoing traumatic dreams.  Patient rates current symptoms of posttraumatic stress disorder at a 4 on a scale of 1-10 with 10 being most severe.    Patient reports she feels as though her mood is relatively stable as of late other than the above symptoms and denies any sustained periods of claudia.  Patient reports she continues to adhere to medication regimen as prescribed.    Patient adamantly convincingly denies suicidal ideation, intent, or plan.  Patient reports she is currently  refraining from any use of alcohol but admits there are times she has thoughts of use.  Patient rates current triggers and thoughts of use at a 1 on a scale of 1-10 with 10 being most severe.    Clinical Maneuvering/Intervention:  Assisted patient in processing above session content; acknowledged and normalized patient’s thoughts, feelings, and concerns.  Allow the patient to discuss/process her feelings and frustrations with ongoing difficulties concerning COVID and validated her feelings.  Also utilized motivational interviewing techniques including complex reflections to discussed the concept of things we can control things he cannot control strongly urged the patient to remind her self she can only take appropriate precautions for herself and her family and cannot control her decisions or behaviors of others.  However, also radiant reinforced the patient's right to take what she feels are appropriate cautions to protect herself and her family, specifically her 2-year-old son.  Also validated the patient's concern of having to go without her medications for approximately 2 days and encouraged her to speak with her 's concerning the possibility of petitioning the court to ensure that she has given her medication during her stay in retirement and offered to write a letter describing the importance of the patient consistently taking her medications and also encouraged her to consider asking her prescriber to do the same.  Continue to utilize cognitive behavioral therapy to assist the patient in developing and utilizing appropriate coping mechanisms to decrease the severity and frequency of symptoms.  Continue to focus on healthy skills of daily living and behavioral activation.  Provided unconditional positive regard in a safe, supportive environment.    Allowed patient to freely discuss issues without interruption or judgment. Provided safe, confidential environment to facilitate the development of positive  therapeutic relationship and encourage open, honest communication. Assisted patient in identifying risk factors which would indicate the need for higher level of care including thoughts to harm self or others and/or self-harming behavior and encouraged patient to contact this office, call 911, or present to the nearest emergency room should any of these events occur. Discussed crisis intervention services and means to access.  Patient adamantly and convincingly denies current suicidal or homicidal ideation or perceptual disturbance.    Assessment    Patient appears to continue to maintain relative stability as compared to her baseline.   Patient symptomology is causing impairment in important areas of functioning.  As result, she can be reasonably expected to benefit from treatment and would likely be at increased risk for decompensation otherwise.  Diagnoses and all orders for this visit:    1. Bipolar affective disorder, most recent episode mixed (HCC) (Primary)    2. Post traumatic stress disorder (PTSD)    3. Alcohol dependence in sustained full remission ([facility])               Mental Status Exam  Hygiene: Good  Dress: Casual  Attitude:  Cooperative  Motor Activity:  Appropriate  Speech:  Normal  Mood: Euthymic  Affect: Happy  Thought Processes:  Linear  Thought Content:  normal  Suicidal Thoughts:  denies  Homicidal Thoughts:  denies  Crisis Safety Plan: yes, to come to the emergency room.  Hallucinations:  denies    Patient's Support Network Includes:  parents and extended family    Progress toward goal: Not at goal    Functional Status: Moderate impairment     Prognosis: Fair with Ongoing Treatment       Plan         Patient will continue in individual outpatient therapy session at [facility] in approximately 2 weeks.  Patient will continue with her primary care provider and follow all recommendations including adhering to medication regimen as prescribed.  Patient will seek further  testing for COVID and follow all recommendations.  Patient will continue in pharmacotherapy with prescriber and follow all recommendations including adhering to medication regimen as prescribed.   Patient will contact this office, call 911 or present to the nearest emergency room should suicidal or homicidal ideations occur. Provide Cognitive Behavioral Therapy and Integrative Therapy to improve functioning, maintain stability, and avoid decompensation and the need for higher level of care.          Return in about 3 weeks (around 2/28/2022) for Next scheduled follow up.      This document signed by Gordon Leiva LCSW, JONATHAN February 8, 2022 07:14 EST

## 2023-01-03 ENCOUNTER — HOSPITAL ENCOUNTER (OUTPATIENT)
Dept: RESPIRATORY THERAPY | Facility: HOSPITAL | Age: 28
Discharge: HOME | End: 2023-01-03
Admitting: NURSE PRACTITIONER
Payer: COMMERCIAL

## 2023-01-03 ENCOUNTER — TRANSCRIBE ORDERS (OUTPATIENT)
Dept: ADMINISTRATIVE | Facility: HOSPITAL | Age: 28
End: 2023-01-03
Payer: COMMERCIAL

## 2023-01-03 DIAGNOSIS — I49.9 VENTRICULAR ARRHYTHMIA: ICD-10-CM

## 2023-01-03 DIAGNOSIS — I49.9 VENTRICULAR ARRHYTHMIA: Primary | ICD-10-CM

## 2023-01-03 PROCEDURE — 93225 XTRNL ECG REC<48 HRS REC: CPT

## 2023-01-20 PROCEDURE — 93227 XTRNL ECG REC<48 HR R&I: CPT | Performed by: INTERNAL MEDICINE

## 2023-02-13 ENCOUNTER — OFFICE VISIT (OUTPATIENT)
Dept: CARDIOLOGY | Facility: CLINIC | Age: 28
End: 2023-02-13
Payer: COMMERCIAL

## 2023-02-13 VITALS
HEIGHT: 62 IN | WEIGHT: 212.8 LBS | DIASTOLIC BLOOD PRESSURE: 73 MMHG | BODY MASS INDEX: 39.16 KG/M2 | SYSTOLIC BLOOD PRESSURE: 111 MMHG

## 2023-02-13 DIAGNOSIS — R07.2 PRECORDIAL PAIN: Primary | ICD-10-CM

## 2023-02-13 PROCEDURE — 93000 ELECTROCARDIOGRAM COMPLETE: CPT | Performed by: INTERNAL MEDICINE

## 2023-02-13 PROCEDURE — 99203 OFFICE O/P NEW LOW 30 MIN: CPT | Performed by: INTERNAL MEDICINE

## 2023-02-13 RX ORDER — METOPROLOL TARTRATE AND HYDROCHLOROTHIAZIDE 50; 25 MG/1; MG/1
1 TABLET ORAL DAILY
COMMUNITY

## 2023-02-13 RX ORDER — LEVOTHYROXINE SODIUM 0.05 MG/1
50 TABLET ORAL DAILY
COMMUNITY
Start: 2023-02-02

## 2023-02-13 RX ORDER — METOPROLOL TARTRATE 50 MG/1
50 TABLET, FILM COATED ORAL DAILY
COMMUNITY
Start: 2023-02-02 | End: 2023-02-13

## 2023-02-13 NOTE — PROGRESS NOTES
Nathaly Head APRN  Veronica Moore  2023    Patient Active Problem List   Diagnosis   • Postpartum care following  delivery       Dear Nathaly Head APRN:    Subjective     Veronica Moore is a 27 y.o. female with the problems as listed above, presents    Chief complaint: Recurrent chest pains.    History of Present Illness: Ms. Moore is a pleasant young 27-year-old  female with no history of known coronary artery disease, has a positive family history of premature coronary artery disease, has history of smoking for about 5 years and currently smokes about half pack a day, nondiabetic, had some elevated blood pressures recently, presents with complaints of have recurrent chest pain on and off for the last 5 to 6 months.  She describes these episodes as being felt as feeling of somebody squeezing her heart and that she feels like she is breathing through a straw.  These episodes will last for about 30 minutes and resolve spontaneously.  They seem to occur at random with no relation to exertion.  There is no associated swelling.  The chest pain seem to radiate to her back.  The chest pains have occurred both during the daytime and at night and sometimes has woken up with these episodes at night.    Veronica Moore  Holter Monitor - 48 Hour -   Order# [id number]  Reading physician: Siddharth Lynn MD Ordering physician: Nathaly Head APRN Study date: 1/3/23     Patient Information    Patient Name   Veronica Moore MRN   [id number] Legal Sex   Female  (Age)   1995 (27 y.o.)     Interpretation Summary       •  The patient was monitored for 2 days 1 hours and 56 minutes.  •  Indications for this exam include Ventricular arrhythmia (I49.9).  •  The predominant rhythm noted during the testing period was sinus rhythm.  •  Average HR: 92. Min HR: 58. Max HR: 154.  •  There were no supraventricular or ventricular arrhythmias noted during the recording.  •  There was no  significant bradycardia, AV block or long pauses noted  •  No symptoms reported during the recording.        Cardiac risk factors:smoking, Positive family Hx. of premature athersclerotivc disease. and Obesity    No Known Allergies:      Current Outpatient Medications:   •  levothyroxine (SYNTHROID, LEVOTHROID) 50 MCG tablet, Take 50 mcg by mouth Daily., Disp: , Rfl:   •  lithium 300 MG tablet, Take 450 mg by mouth 2 (two) times a day., Disp: , Rfl:   •  metoprolol-hydrochlorothiazide (LOPRESSOR HCT) 50-25 MG per tablet, Take 1 tablet by mouth Daily., Disp: , Rfl:   •  lurasidone (LATUDA) 40 MG tablet tablet, Take 60 mg by mouth Daily., Disp: , Rfl:   •  prazosin (MINIPRESS) 1 MG capsule, Take 3 mg by mouth Every Night., Disp: , Rfl:     Past Medical History:   Diagnosis Date   • Alcohol abuse    • Anxiety    • Depression    • Disease of thyroid gland      Past Surgical History:   Procedure Laterality Date   • BREAST BIOPSY     •  SECTION Bilateral 2019    Procedure:  SECTION PRIMARY;  Surgeon: Delmar Ryan MD;  Location: [facility] LABOR DELIVERY;  Service: Obstetrics/Gynecology   • MOUTH SURGERY       Family History   Problem Relation Age of Onset   • Diabetes Mother    • Hypertension Mother    • Hypertension Father    • Diabetes Father    • Cancer Maternal Aunt    • Heart attack Maternal Grandmother    • Hyperlipidemia Maternal Grandmother    • Hypertension Maternal Grandmother    • COPD Maternal Grandmother    • Hypertension Maternal Grandfather    • Heart attack Maternal Grandfather    • Stroke Paternal Grandmother    • Diabetes Paternal Grandfather      Social History     Tobacco Use   • Smoking status: Every Day     Packs/day: 0.50     Types: Cigarettes   • Smokeless tobacco: Never   Substance Use Topics   • Alcohol use: Yes   • Drug use: No       Review of Systems   Constitutional: Positive for malaise/fatigue.   HENT: Negative.    Eyes: Negative.    Cardiovascular: Positive for chest  "pain and palpitations.   Respiratory: Positive for shortness of breath.    Endocrine: Negative.    Hematologic/Lymphatic: Negative.    Skin: Negative.    Musculoskeletal: Negative.    Gastrointestinal: Negative.    Genitourinary: Negative.    Neurological: Positive for numbness.   Psychiatric/Behavioral: Negative.      Objective   Blood pressure 111/73, height 157.5 cm (62\"), weight 96.5 kg (212 lb 12.8 oz), unknown if currently breastfeeding.  Body mass index is 38.92 kg/m².    Vitals reviewed.   Constitutional:       Appearance: Well-developed.   Eyes:      Conjunctiva/sclera: Conjunctivae normal.   HENT:      Head: Normocephalic.   Neck:      Thyroid: No thyromegaly.      Vascular: No JVD.      Trachea: No tracheal deviation.   Pulmonary:      Effort: No respiratory distress.      Breath sounds: Normal breath sounds. No wheezing. No rales.   Cardiovascular:      PMI at left midclavicular line. Normal rate. Regular rhythm. Normal S1. Normal S2.      Murmurs: There is no murmur.      No gallop. No click. No rub.   Pulses:     Intact distal pulses.   Edema:     Peripheral edema absent.   Abdominal:      General: Bowel sounds are normal.      Palpations: Abdomen is soft. There is no abdominal mass.      Tenderness: There is no abdominal tenderness.   Musculoskeletal:      Cervical back: Normal range of motion and neck supple. Skin:     General: Skin is warm and dry.   Neurological:      Mental Status: Alert and oriented to person, place, and time.      Cranial Nerves: No cranial nerve deficit.           ECG 12 Lead    Date/Time: 2/13/2023 11:07 AM  Performed by: Siddharth Lynn MD  Authorized by: Siddharth Lynn MD   Previous ECG: no previous ECG available  Rhythm: sinus rhythm  BPM: 85  Conduction: conduction normal  ST Segments: ST segments normal  T Waves: T waves normal    Clinical impression: normal ECG                Assessment & Plan :   Diagnosis Plan    Precordial pain  ECG 12 Lead    Adult Stress Echo W/ " Cont or Stress Agent if Necessary Per Protocol        Recommendations:  Orders Placed This Encounter   Procedures   • ECG 12 Lead      Evaluate further with a treadmill stress echo study.    Return in about 5 weeks (around 3/20/2023).    As always, I appreciate very much the opportunity to participate in the cardiovascular care of your patients.      With Best Regards,    Siddharth Lynn MD, [facility]    Dragon disclaimer:  Much of this encounter note is an electronic transcription/translation of spoken language to printed text. The electronic translation of spoken language may permit erroneous, or at times, nonsensical words or phrases to be inadvertently transcribed; Although I have reviewed the note for such errors, some may still exist.

## 2023-02-22 DIAGNOSIS — R07.2 PRECORDIAL PAIN: Primary | ICD-10-CM

## 2023-03-17 ENCOUNTER — TELEPHONE (OUTPATIENT)
Dept: CARDIOLOGY | Facility: CLINIC | Age: 28
End: 2023-03-17
Payer: COMMERCIAL

## 2025-02-20 ENCOUNTER — HOSPITAL ENCOUNTER (OUTPATIENT)
Dept: ULTRASOUND IMAGING | Facility: HOSPITAL | Age: 30
Discharge: HOME | End: 2025-02-20
Admitting: NURSE PRACTITIONER
Payer: COMMERCIAL

## 2025-02-20 DIAGNOSIS — N63.10 MASS OF RIGHT BREAST, UNSPECIFIED QUADRANT: ICD-10-CM

## 2025-02-20 DIAGNOSIS — N63.20 MASS OF LEFT BREAST, UNSPECIFIED QUADRANT: ICD-10-CM

## 2025-02-20 PROCEDURE — 76642 ULTRASOUND BREAST LIMITED: CPT

## (undated) DEVICE — HYDROGEL COATED LATEX URINE METER FOLEY TRAY,16 FR/CH (5.3 MM), 5 ML CATHETER PRE-CONNECTED TO 2000 ML DRAINAGE BAG WITH NEEDLE SAMPLING: Brand: DOVER

## (undated) DEVICE — BG RESUSCITATOR INFANT BABYBLUE2

## (undated) DEVICE — APPL CHLORAPREP W/TINT 26ML ORNG

## (undated) DEVICE — PK C/SECT 70